# Patient Record
Sex: FEMALE | Race: WHITE | NOT HISPANIC OR LATINO | Employment: OTHER | URBAN - METROPOLITAN AREA
[De-identification: names, ages, dates, MRNs, and addresses within clinical notes are randomized per-mention and may not be internally consistent; named-entity substitution may affect disease eponyms.]

---

## 2017-01-23 ENCOUNTER — HOSPITAL ENCOUNTER (EMERGENCY)
Facility: HOSPITAL | Age: 62
Discharge: HOME | End: 2017-01-23
Attending: EMERGENCY MEDICINE
Payer: COMMERCIAL

## 2017-01-23 VITALS
HEART RATE: 77 BPM | RESPIRATION RATE: 18 BRPM | BODY MASS INDEX: 36.65 KG/M2 | OXYGEN SATURATION: 97 % | WEIGHT: 220 LBS | DIASTOLIC BLOOD PRESSURE: 67 MMHG | HEIGHT: 65 IN | TEMPERATURE: 99 F | SYSTOLIC BLOOD PRESSURE: 137 MMHG

## 2017-01-23 DIAGNOSIS — M79.606 LEG PAIN: ICD-10-CM

## 2017-01-23 DIAGNOSIS — M25.569 KNEE PAIN: ICD-10-CM

## 2017-01-23 DIAGNOSIS — M25.462 EFFUSION OF LEFT KNEE: Primary | ICD-10-CM

## 2017-01-23 PROCEDURE — 63600175 PHARM REV CODE 636 W HCPCS: Performed by: NURSE PRACTITIONER

## 2017-01-23 PROCEDURE — 25000003 PHARM REV CODE 250: Performed by: NURSE PRACTITIONER

## 2017-01-23 PROCEDURE — 29505 APPLICATION LONG LEG SPLINT: CPT | Mod: LT

## 2017-01-23 PROCEDURE — 96372 THER/PROPH/DIAG INJ SC/IM: CPT

## 2017-01-23 PROCEDURE — 99284 EMERGENCY DEPT VISIT MOD MDM: CPT | Mod: 25

## 2017-01-23 RX ORDER — HYDROCODONE BITARTRATE AND ACETAMINOPHEN 5; 325 MG/1; MG/1
1 TABLET ORAL
Status: COMPLETED | OUTPATIENT
Start: 2017-01-23 | End: 2017-01-23

## 2017-01-23 RX ORDER — KETOROLAC TROMETHAMINE 30 MG/ML
30 INJECTION, SOLUTION INTRAMUSCULAR; INTRAVENOUS
Status: COMPLETED | OUTPATIENT
Start: 2017-01-23 | End: 2017-01-23

## 2017-01-23 RX ORDER — DICLOFENAC SODIUM 50 MG/1
50 TABLET, DELAYED RELEASE ORAL 3 TIMES DAILY PRN
Qty: 30 TABLET | Refills: 0 | Status: SHIPPED | OUTPATIENT
Start: 2017-01-23 | End: 2021-02-11

## 2017-01-23 RX ORDER — HYDROCODONE BITARTRATE AND ACETAMINOPHEN 5; 325 MG/1; MG/1
1 TABLET ORAL EVERY 4 HOURS PRN
Qty: 6 TABLET | Refills: 0 | Status: SHIPPED | OUTPATIENT
Start: 2017-01-23 | End: 2017-01-23

## 2017-01-23 RX ADMIN — KETOROLAC TROMETHAMINE 30 MG: 30 INJECTION, SOLUTION INTRAMUSCULAR at 06:01

## 2017-01-23 RX ADMIN — HYDROCODONE BITARTRATE AND ACETAMINOPHEN 1 TABLET: 5; 325 TABLET ORAL at 08:01

## 2017-01-23 NOTE — ED AVS SNAPSHOT
[facility] [address] [place] [address]               Courtney Nino   2017  5:47 PM   ED    Description:  Female : 1955   Department:  [facility]           Your Care was Coordinated By:     Provider Role From To    Lavon Perez MD Attending Provider 17 0257 --    Lola Gardner NP Nurse Practitioner 17 [phone] --      Reason for Visit     Knee Pain           Diagnoses this Visit        Comments    Effusion of left knee    -  Primary     Leg pain         Knee pain           ED Disposition     ED Disposition Condition Comment    Discharge             To Do List           Follow-up Information     Follow up with [facility].    Specialty:  Emergency Medicine    Why:  As needed, If symptoms worsen    Contact information:    [address] [place] [id number] [phone]        Schedule an appointment as soon as possible for a visit with John Talavera MD.    Specialty:  Orthopedic Surgery    Contact information:    [address] [facility]  [place] [address]  [phone]          Schedule an appointment as soon as possible for a visit with Blair Taylor MD.    Specialties:  Sports Medicine, Orthopedic Surgery    Contact information:    [address] [place] [address] [address] [place] [address]  [phone]         These Medications        Disp Refills Start End    diclofenac (VOLTAREN) 50 MG EC tablet 30 tablet 0 2017     Take 1 tablet (50 mg total) by mouth 3 (three) times daily as needed (for pain). - Oral      [facility] On Call     [facility] On Call Nurse Care Line -  Assistance  Registered nurses in the [facility] provide clinical advisement, health education, appointment booking, and other advisory services.  Call for this free service at [phone].             Medications           START taking these NEW medications        Refills    diclofenac (VOLTAREN) 50  "MG EC tablet 0    Sig: Take 1 tablet (50 mg total) by mouth 3 (three) times daily as needed (for pain).    Class: Print    Route: Oral      These medications were administered today        Dose Freq    ketorolac injection 30 mg 30 mg ED 1 Time    Sig: Inject 30 mg into the muscle ED 1 Time.    Class: Normal    Route: Intramuscular    hydrocodone-acetaminophen 5-325mg per tablet 1 tablet 1 tablet ED 1 Time    Sig: Take 1 tablet by mouth ED 1 Time.    Class: Normal    Route: Oral    Cosign for Ordering: Required by Lavon Perez MD      STOP taking these medications     ondansetron (ZOFRAN) 8 MG tablet Take 1 tablet (8 mg total) by mouth every 12 (twelve) hours as needed for Nausea.           Verify that the below list of medications is an accurate representation of the medications you are currently taking.  If none reported, the list may be blank. If incorrect, please contact your healthcare provider. Carry this list with you in case of emergency.           Current Medications     diclofenac (VOLTAREN) 50 MG EC tablet Take 1 tablet (50 mg total) by mouth 3 (three) times daily as needed (for pain).           Clinical Reference Information           Your Vitals Were     BP Pulse Temp Resp Height Weight    137/67 (BP Location: Right arm, Patient Position: Sitting) 77 98.5 °F (36.9 °C) (Oral) 18 5' 5" (1.651 m) 99.8 kg (220 lb)    SpO2 BMI             97% 36.61 kg/m2         Allergies as of 1/23/2017     No Known Allergies      Immunizations Administered on Date of Encounter - 1/23/2017     None      ED Micro, Lab, POCT     None      ED Imaging Orders     Start Ordered       Status Ordering Provider    01/23/17 1841 01/23/17 1821  X-Ray Knee 1 or 2 View Left  1 time imaging      Final result     01/23/17 1821 01/23/17 1821  US Lower Extremity Veins Left  1 time imaging      In process     01/23/17 1821 01/23/17 1821    1 time imaging,   Status:  Canceled      Canceled       Discharge References/Attachments     KNEE " EFFUSION (ENGLISH)    KNEE PAIN (ENGLISH)      [facility] Sign-Up     Activating your [facility] account is as easy as 1-2-3!     1) Visit [email or web address], select Sign Up Now, enter this activation code and your date of birth, then select Next.  [email or web address]  Expires: 3/9/2017  8:27 PM      2) Create a username and password to use when you visit [facility] in the future and select a security question in case you lose your password and select Next.    3) Enter your e-mail address and click Sign Up!    Additional Information  If you have questions, please e-mail [email or web address] or call [phone] to talk to our [facility] staff. Remember, [facility] is NOT to be used for urgent needs. For medical emergencies, dial 911.         Smoking Cessation     If you would like to quit smoking:   You may be eligible for free services if you are a [place] resident and started smoking cigarettes before September 1, 1988.  Call the Smoking Cessation Trust (SCT) toll free at (050) 157-0680 or (566) 110-5331.   Call 9-548-QUIT-NOW if you do not meet the above criteria.             [facility] complies with applicable Federal civil rights laws and does not discriminate on the basis of race, color, national origin, age, disability, or sex.        Language Assistance Services     ATTENTION: Language assistance services are available, free of charge. Please call [phone].      ATENCIÓN: Si habla español, tiene a rick disposición servicios gratuitos de asistencia lingüística. Llame al [phone].     CHÚ Ý: N?u b?n nói Ti?ng Vi?t, có các d?ch v? h? tr? ngôn ng? mi?n phí dành cho b?n. G?i s? [phone].

## 2017-01-24 NOTE — ED PROVIDER NOTES
Encounter Date: 1/23/2017       History     Chief Complaint   Patient presents with    Knee Pain     x 2 weeks      Review of patient's allergies indicates:  No Known Allergies  HPI Comments: Courtney Nino is a 61 year old female with no pmh presenting to the ED with c/o left knee and calf pain. The patient states that she began having pain approximately two weeks ago without known injury or trauma. She states that approximately one week ago, she did feel a pop in her knee when stepping up. She states the pain has radiated into the calf and that she has had posterior calf swelling. She has not been taking any medications for pain and has no prior history of knee complications. She has no prior history of DVT.     The history is provided by the patient.     History reviewed. No pertinent past medical history.  No past medical history pertinent negatives.  Past Surgical History   Procedure Laterality Date    Hysterectomy      Tonsillectomy      Breast surgery       knot on right     History reviewed. No pertinent family history.  Social History   Substance Use Topics    Smoking status: Current Every Day Smoker     Packs/day: 1.00     Years: 39.00     Types: Cigarettes    Smokeless tobacco: None    Alcohol use Yes      Comment: occ     Review of Systems   Constitutional: Negative.    HENT: Negative.    Cardiovascular: Positive for leg swelling. Negative for chest pain and palpitations.   Gastrointestinal: Negative.    Genitourinary: Negative.    Musculoskeletal: Positive for arthralgias (left knee).   Skin: Negative.    Neurological: Negative.        Physical Exam   Initial Vitals   BP Pulse Resp Temp SpO2   01/23/17 1732 01/23/17 1732 01/23/17 1732 01/23/17 1732 01/23/17 1732   135/73 69 18 99 °F (37.2 °C) 97 %     Physical Exam    Nursing note and vitals reviewed.  Constitutional: She appears well-developed and well-nourished. She is not diaphoretic. No distress.   HENT:   Head: Normocephalic and atraumatic.    Eyes: Conjunctivae are normal.   Neck: Normal range of motion.   Cardiovascular: Normal rate and regular rhythm.   Pulmonary/Chest: Breath sounds normal. No respiratory distress.   Musculoskeletal:        Left knee: She exhibits decreased range of motion (due to pain). She exhibits no swelling, no effusion, no deformity and no bony tenderness. Tenderness found. Medial joint line tenderness noted.        Left lower leg: She exhibits tenderness. She exhibits no swelling.        Legs:  Decreased ROM due to pain. No erythema or warmth to left knee   Neurological: She is alert and oriented to person, place, and time.   Skin: Skin is warm and dry.         ED Course   Procedures  Labs Reviewed - No data to display                APC / Resident Notes:   Courtney Nino is a 61 year old female presenting to the ED with left knee pain without injury or trauma. She reported left calf swelling as well with no swelling of knee or calf noted on exam. Ultrasound was negative for DVT. Xray read as follows: 2 views of the left knee demonstrate a joint effusion but no evidence of acute fracture. There is slight medial joint space narrowing. I do not suspect septic arthritis as the joint is not erythematous or warm. She did have limited ROM due to pain but was able to flex and extend the knee. I do not suspect complete ligamentous injury. She was placed in a knee immobilizer and provided crutches. I believe she will require follow up with orthopedics so she was provided referral at discharge. I discussed specific return precautions and provided prescription for NSAIDs for pain relief. Based on my clinical evaluation, I do not appreciate any immediate, emergent, or life threatening condition or etiology that warrants additional workup today and feel that the patient can be discharged with close follow up care.              Attending Attestation:     Physician Attestation Statement for NP/PA:   I discussed this assessment and plan of this  patient with the NP/PA, but I did not personally examine the patient. The face to face encounter was performed by the NP/PA.    Other NP/PA Attestation Additions:    History of Present Illness: 61-year-old female presented with a chief complaint of knee and calf pain.    Medical Decision Making: Initial differential diagnosis included but not limited to fracture, ligamentous injury, and DVT.  The patient's x-ray showed no acute abnormalities per my independent interpretation.  I am in agreement with the nurse practitioner's assessment, treatment, and plan of care.                 ED Course     Clinical Impression:   The primary encounter diagnosis was Effusion of left knee. Diagnoses of Leg pain and Knee pain were also pertinent to this visit.    Disposition:   Disposition: Discharged  Condition: Stable       Lola Gardner NP  01/24/17 1711       Lavon Perez MD  01/24/17 8133

## 2017-01-24 NOTE — ED NOTES
Pt arrives in WC, c/o pain to the left lower extremity. Pt states she injured leg while climbing step. Leg is mildly swollen and red. Pt awake, alert.

## 2017-01-24 NOTE — ED NOTES
Patient instructed and trained on crutch use. Patient verbalized understanding and demonstrated proper crutch usage.

## 2021-02-11 ENCOUNTER — OFFICE VISIT (OUTPATIENT)
Dept: FAMILY MEDICINE | Facility: CLINIC | Age: 66
End: 2021-02-11
Payer: MEDICARE

## 2021-02-11 VITALS
RESPIRATION RATE: 20 BRPM | HEIGHT: 65 IN | DIASTOLIC BLOOD PRESSURE: 96 MMHG | SYSTOLIC BLOOD PRESSURE: 162 MMHG | OXYGEN SATURATION: 99 % | BODY MASS INDEX: 40.46 KG/M2 | HEART RATE: 92 BPM | TEMPERATURE: 98 F | WEIGHT: 242.81 LBS

## 2021-02-11 DIAGNOSIS — E66.01 CLASS 3 SEVERE OBESITY WITH BODY MASS INDEX (BMI) OF 40.0 TO 44.9 IN ADULT, UNSPECIFIED OBESITY TYPE, UNSPECIFIED WHETHER SERIOUS COMORBIDITY PRESENT: ICD-10-CM

## 2021-02-11 DIAGNOSIS — F17.210 CIGARETTE SMOKER: ICD-10-CM

## 2021-02-11 DIAGNOSIS — R06.09 DOE (DYSPNEA ON EXERTION): Primary | ICD-10-CM

## 2021-02-11 DIAGNOSIS — Z12.2 ENCOUNTER FOR SCREENING FOR MALIGNANT NEOPLASM OF RESPIRATORY ORGANS: ICD-10-CM

## 2021-02-11 DIAGNOSIS — I10 ESSENTIAL HYPERTENSION: ICD-10-CM

## 2021-02-11 DIAGNOSIS — R53.83 FATIGUE, UNSPECIFIED TYPE: ICD-10-CM

## 2021-02-11 DIAGNOSIS — Z00.00 PREVENTATIVE HEALTH CARE: ICD-10-CM

## 2021-02-11 DIAGNOSIS — I10 ESSENTIAL (PRIMARY) HYPERTENSION: ICD-10-CM

## 2021-02-11 PROBLEM — E66.813 CLASS 3 SEVERE OBESITY WITH BODY MASS INDEX (BMI) OF 40.0 TO 44.9 IN ADULT: Status: ACTIVE | Noted: 2021-02-11

## 2021-02-11 LAB
EKG 12-LEAD: NORMAL
PR INTERVAL: NORMAL
PRT AXES: NORMAL
QRS DURATION: NORMAL
QT/QTC: NORMAL
VENTRICULAR RATE: NORMAL

## 2021-02-11 PROCEDURE — 1125F AMNT PAIN NOTED PAIN PRSNT: CPT | Mod: S$GLB,,, | Performed by: FAMILY MEDICINE

## 2021-02-11 PROCEDURE — 99396 PREV VISIT EST AGE 40-64: CPT | Mod: 25,S$GLB,, | Performed by: FAMILY MEDICINE

## 2021-02-11 PROCEDURE — 1125F PR PAIN SEVERITY QUANTIFIED, PAIN PRESENT: ICD-10-PCS | Mod: S$GLB,,, | Performed by: FAMILY MEDICINE

## 2021-02-11 PROCEDURE — 3077F PR MOST RECENT SYSTOLIC BLOOD PRESSURE >= 140 MM HG: ICD-10-PCS | Mod: S$GLB,,, | Performed by: FAMILY MEDICINE

## 2021-02-11 PROCEDURE — 99396 PR PREVENTIVE VISIT,EST,40-64: ICD-10-PCS | Mod: 25,S$GLB,, | Performed by: FAMILY MEDICINE

## 2021-02-11 PROCEDURE — 93000 POCT EKG 12-LEAD: ICD-10-PCS | Mod: S$GLB,,, | Performed by: FAMILY MEDICINE

## 2021-02-11 PROCEDURE — 3008F BODY MASS INDEX DOCD: CPT | Mod: S$GLB,,, | Performed by: FAMILY MEDICINE

## 2021-02-11 PROCEDURE — 3077F SYST BP >= 140 MM HG: CPT | Mod: S$GLB,,, | Performed by: FAMILY MEDICINE

## 2021-02-11 PROCEDURE — 93000 ELECTROCARDIOGRAM COMPLETE: CPT | Mod: S$GLB,,, | Performed by: FAMILY MEDICINE

## 2021-02-11 PROCEDURE — 3080F PR MOST RECENT DIASTOLIC BLOOD PRESSURE >= 90 MM HG: ICD-10-PCS | Mod: S$GLB,,, | Performed by: FAMILY MEDICINE

## 2021-02-11 PROCEDURE — 3008F PR BODY MASS INDEX (BMI) DOCUMENTED: ICD-10-PCS | Mod: S$GLB,,, | Performed by: FAMILY MEDICINE

## 2021-02-11 PROCEDURE — 3080F DIAST BP >= 90 MM HG: CPT | Mod: S$GLB,,, | Performed by: FAMILY MEDICINE

## 2021-02-11 RX ORDER — IRBESARTAN 150 MG/1
150 TABLET ORAL NIGHTLY
Qty: 90 TABLET | Refills: 3 | Status: ON HOLD | OUTPATIENT
Start: 2021-02-11 | End: 2021-10-30 | Stop reason: HOSPADM

## 2021-02-17 ENCOUNTER — HOSPITAL ENCOUNTER (OUTPATIENT)
Dept: RADIOLOGY | Facility: HOSPITAL | Age: 66
Discharge: HOME | End: 2021-02-17
Attending: FAMILY MEDICINE
Payer: MEDICARE

## 2021-02-17 DIAGNOSIS — F17.210 CIGARETTE SMOKER: ICD-10-CM

## 2021-02-17 PROCEDURE — 71271 CT THORAX LUNG CANCER SCR C-: CPT | Mod: TC,PO

## 2021-02-18 ENCOUNTER — TELEPHONE (OUTPATIENT)
Dept: FAMILY MEDICINE | Facility: CLINIC | Age: 66
End: 2021-02-18

## 2021-02-19 ENCOUNTER — TELEPHONE (OUTPATIENT)
Dept: FAMILY MEDICINE | Facility: CLINIC | Age: 66
End: 2021-02-19

## 2021-02-22 ENCOUNTER — LAB VISIT (OUTPATIENT)
Dept: LAB | Facility: HOSPITAL | Age: 66
End: 2021-02-22
Attending: FAMILY MEDICINE
Payer: MEDICARE

## 2021-02-22 ENCOUNTER — TELEPHONE (OUTPATIENT)
Dept: FAMILY MEDICINE | Facility: CLINIC | Age: 66
End: 2021-02-22

## 2021-02-22 DIAGNOSIS — E55.9 VITAMIN D DEFICIENCY: Primary | ICD-10-CM

## 2021-02-22 DIAGNOSIS — R06.09 DOE (DYSPNEA ON EXERTION): ICD-10-CM

## 2021-02-22 DIAGNOSIS — E66.01 CLASS 3 SEVERE OBESITY WITH BODY MASS INDEX (BMI) OF 40.0 TO 44.9 IN ADULT, UNSPECIFIED OBESITY TYPE, UNSPECIFIED WHETHER SERIOUS COMORBIDITY PRESENT: ICD-10-CM

## 2021-02-22 DIAGNOSIS — I10 ESSENTIAL (PRIMARY) HYPERTENSION: ICD-10-CM

## 2021-02-22 DIAGNOSIS — Z00.00 PREVENTATIVE HEALTH CARE: ICD-10-CM

## 2021-02-22 DIAGNOSIS — R53.83 FATIGUE, UNSPECIFIED TYPE: ICD-10-CM

## 2021-02-22 DIAGNOSIS — I10 ESSENTIAL HYPERTENSION: ICD-10-CM

## 2021-02-22 LAB
25(OH)D3+25(OH)D2 SERPL-MCNC: 23 NG/ML (ref 30–96)
ALBUMIN SERPL BCP-MCNC: 4 G/DL (ref 3.5–5.2)
ALP SERPL-CCNC: 71 U/L (ref 55–135)
ALT SERPL W/O P-5'-P-CCNC: 21 U/L (ref 10–44)
ANION GAP SERPL CALC-SCNC: 9 MMOL/L (ref 8–16)
AST SERPL-CCNC: 18 U/L (ref 10–40)
BASOPHILS # BLD AUTO: 0.06 K/UL (ref 0–0.2)
BASOPHILS NFR BLD: 0.8 % (ref 0–1.9)
BILIRUB SERPL-MCNC: 0.7 MG/DL (ref 0.1–1)
BUN SERPL-MCNC: 11 MG/DL (ref 8–23)
CALCIUM SERPL-MCNC: 9 MG/DL (ref 8.7–10.5)
CHLORIDE SERPL-SCNC: 102 MMOL/L (ref 95–110)
CHOLEST SERPL-MCNC: 197 MG/DL (ref 120–199)
CHOLEST/HDLC SERPL: 4.8 {RATIO} (ref 2–5)
CO2 SERPL-SCNC: 30 MMOL/L (ref 23–29)
CREAT SERPL-MCNC: 0.7 MG/DL (ref 0.5–1.4)
DIFFERENTIAL METHOD: ABNORMAL
EOSINOPHIL # BLD AUTO: 0.2 K/UL (ref 0–0.5)
EOSINOPHIL NFR BLD: 2.6 % (ref 0–8)
ERYTHROCYTE [DISTWIDTH] IN BLOOD BY AUTOMATED COUNT: 12.6 % (ref 11.5–14.5)
EST. GFR  (AFRICAN AMERICAN): >60 ML/MIN/1.73 M^2
EST. GFR  (NON AFRICAN AMERICAN): >60 ML/MIN/1.73 M^2
GLUCOSE SERPL-MCNC: 131 MG/DL (ref 70–110)
HCT VFR BLD AUTO: 47.5 % (ref 37–48.5)
HDLC SERPL-MCNC: 41 MG/DL (ref 40–75)
HDLC SERPL: 20.8 % (ref 20–50)
HGB BLD-MCNC: 14.8 G/DL (ref 12–16)
IMM GRANULOCYTES # BLD AUTO: 0.03 K/UL (ref 0–0.04)
IMM GRANULOCYTES NFR BLD AUTO: 0.4 % (ref 0–0.5)
LDLC SERPL CALC-MCNC: 141.2 MG/DL (ref 63–159)
LYMPHOCYTES # BLD AUTO: 2.9 K/UL (ref 1–4.8)
LYMPHOCYTES NFR BLD: 38.3 % (ref 18–48)
MCH RBC QN AUTO: 30.1 PG (ref 27–31)
MCHC RBC AUTO-ENTMCNC: 31.2 G/DL (ref 32–36)
MCV RBC AUTO: 97 FL (ref 82–98)
MONOCYTES # BLD AUTO: 0.7 K/UL (ref 0.3–1)
MONOCYTES NFR BLD: 8.5 % (ref 4–15)
NEUTROPHILS # BLD AUTO: 3.8 K/UL (ref 1.8–7.7)
NEUTROPHILS NFR BLD: 49.4 % (ref 38–73)
NONHDLC SERPL-MCNC: 156 MG/DL
NRBC BLD-RTO: 0 /100 WBC
PLATELET # BLD AUTO: 287 K/UL (ref 150–350)
PMV BLD AUTO: 11.3 FL (ref 9.2–12.9)
POTASSIUM SERPL-SCNC: 4.9 MMOL/L (ref 3.5–5.1)
PROT SERPL-MCNC: 7.1 G/DL (ref 6–8.4)
RBC # BLD AUTO: 4.92 M/UL (ref 4–5.4)
SODIUM SERPL-SCNC: 141 MMOL/L (ref 136–145)
TRIGL SERPL-MCNC: 74 MG/DL (ref 30–150)
WBC # BLD AUTO: 7.65 K/UL (ref 3.9–12.7)

## 2021-02-22 PROCEDURE — 36415 COLL VENOUS BLD VENIPUNCTURE: CPT

## 2021-02-22 PROCEDURE — 80061 LIPID PANEL: CPT

## 2021-02-22 PROCEDURE — 82306 VITAMIN D 25 HYDROXY: CPT

## 2021-02-22 PROCEDURE — 86803 HEPATITIS C AB TEST: CPT

## 2021-02-22 PROCEDURE — 87389 HIV-1 AG W/HIV-1&-2 AB AG IA: CPT

## 2021-02-22 PROCEDURE — 80053 COMPREHEN METABOLIC PANEL: CPT

## 2021-02-22 PROCEDURE — 85025 COMPLETE CBC W/AUTO DIFF WBC: CPT

## 2021-02-22 RX ORDER — ERGOCALCIFEROL 1.25 MG/1
50000 CAPSULE ORAL
Qty: 4 CAPSULE | Refills: 11 | Status: ON HOLD | OUTPATIENT
Start: 2021-02-22 | End: 2021-10-25

## 2021-02-23 LAB
HCV AB S/CO SERPL IA: <0.1 S/CO RATIO (ref 0–0.9)
HIV 1+2 AB+HIV1 P24 AG SERPL QL IA: NON REACTIVE

## 2021-02-24 ENCOUNTER — TELEPHONE (OUTPATIENT)
Dept: FAMILY MEDICINE | Facility: CLINIC | Age: 66
End: 2021-02-24

## 2021-02-26 ENCOUNTER — OFFICE VISIT (OUTPATIENT)
Dept: FAMILY MEDICINE | Facility: CLINIC | Age: 66
End: 2021-02-26
Payer: MEDICARE

## 2021-02-26 VITALS
TEMPERATURE: 98 F | HEIGHT: 65 IN | BODY MASS INDEX: 40.32 KG/M2 | HEART RATE: 55 BPM | OXYGEN SATURATION: 99 % | RESPIRATION RATE: 20 BRPM | WEIGHT: 242 LBS | DIASTOLIC BLOOD PRESSURE: 55 MMHG | SYSTOLIC BLOOD PRESSURE: 130 MMHG

## 2021-02-26 DIAGNOSIS — R53.82 CHRONIC FATIGUE: ICD-10-CM

## 2021-02-26 DIAGNOSIS — Z23 IMMUNIZATION DUE: ICD-10-CM

## 2021-02-26 DIAGNOSIS — I10 ESSENTIAL HYPERTENSION: Primary | ICD-10-CM

## 2021-02-26 DIAGNOSIS — F51.12 INSUFFICIENT SLEEP SYNDROME: ICD-10-CM

## 2021-02-26 DIAGNOSIS — Z12.31 ENCOUNTER FOR SCREENING MAMMOGRAM FOR BREAST CANCER: ICD-10-CM

## 2021-02-26 PROCEDURE — 1126F PR PAIN SEVERITY QUANTIFIED, NO PAIN PRESENT: ICD-10-PCS | Mod: S$GLB,,, | Performed by: FAMILY MEDICINE

## 2021-02-26 PROCEDURE — 90714 TD VACC NO PRESV 7 YRS+ IM: CPT | Mod: S$GLB,,, | Performed by: FAMILY MEDICINE

## 2021-02-26 PROCEDURE — 3078F PR MOST RECENT DIASTOLIC BLOOD PRESSURE < 80 MM HG: ICD-10-PCS | Mod: S$GLB,,, | Performed by: FAMILY MEDICINE

## 2021-02-26 PROCEDURE — 99214 PR OFFICE/OUTPT VISIT, EST, LEVL IV, 30-39 MIN: ICD-10-PCS | Mod: 25,S$GLB,, | Performed by: FAMILY MEDICINE

## 2021-02-26 PROCEDURE — 3008F BODY MASS INDEX DOCD: CPT | Mod: S$GLB,,, | Performed by: FAMILY MEDICINE

## 2021-02-26 PROCEDURE — 90714 TD VACCINE GREATER THAN OR EQUAL TO 7YO PRESERVATIVE FREE IM: ICD-10-PCS | Mod: S$GLB,,, | Performed by: FAMILY MEDICINE

## 2021-02-26 PROCEDURE — 3075F PR MOST RECENT SYSTOLIC BLOOD PRESS GE 130-139MM HG: ICD-10-PCS | Mod: S$GLB,,, | Performed by: FAMILY MEDICINE

## 2021-02-26 PROCEDURE — 90471 IMMUNIZATION ADMIN: CPT | Mod: S$GLB,,, | Performed by: FAMILY MEDICINE

## 2021-02-26 PROCEDURE — 3078F DIAST BP <80 MM HG: CPT | Mod: S$GLB,,, | Performed by: FAMILY MEDICINE

## 2021-02-26 PROCEDURE — 3075F SYST BP GE 130 - 139MM HG: CPT | Mod: S$GLB,,, | Performed by: FAMILY MEDICINE

## 2021-02-26 PROCEDURE — 99214 OFFICE O/P EST MOD 30 MIN: CPT | Mod: 25,S$GLB,, | Performed by: FAMILY MEDICINE

## 2021-02-26 PROCEDURE — 90471 TD VACCINE GREATER THAN OR EQUAL TO 7YO PRESERVATIVE FREE IM: ICD-10-PCS | Mod: S$GLB,,, | Performed by: FAMILY MEDICINE

## 2021-02-26 PROCEDURE — 1126F AMNT PAIN NOTED NONE PRSNT: CPT | Mod: S$GLB,,, | Performed by: FAMILY MEDICINE

## 2021-02-26 PROCEDURE — 3008F PR BODY MASS INDEX (BMI) DOCUMENTED: ICD-10-PCS | Mod: S$GLB,,, | Performed by: FAMILY MEDICINE

## 2021-03-04 ENCOUNTER — PROCEDURE VISIT (OUTPATIENT)
Dept: SLEEP MEDICINE | Facility: HOSPITAL | Age: 66
End: 2021-03-04
Attending: FAMILY MEDICINE
Payer: MEDICARE

## 2021-03-04 DIAGNOSIS — F51.12 INSUFFICIENT SLEEP SYNDROME: ICD-10-CM

## 2021-03-04 DIAGNOSIS — R53.82 CHRONIC FATIGUE: ICD-10-CM

## 2021-03-04 PROCEDURE — 95806 SLEEP STUDY UNATT&RESP EFFT: CPT

## 2021-03-08 ENCOUNTER — TELEPHONE (OUTPATIENT)
Dept: FAMILY MEDICINE | Facility: CLINIC | Age: 66
End: 2021-03-08

## 2021-03-10 ENCOUNTER — HOSPITAL ENCOUNTER (OUTPATIENT)
Dept: RADIOLOGY | Facility: HOSPITAL | Age: 66
Discharge: HOME | End: 2021-03-10
Attending: FAMILY MEDICINE
Payer: MEDICARE

## 2021-03-10 DIAGNOSIS — Z12.31 ENCOUNTER FOR SCREENING MAMMOGRAM FOR BREAST CANCER: ICD-10-CM

## 2021-03-10 PROCEDURE — 77067 SCR MAMMO BI INCL CAD: CPT | Mod: TC,PO

## 2021-03-11 ENCOUNTER — TELEPHONE (OUTPATIENT)
Dept: FAMILY MEDICINE | Facility: CLINIC | Age: 66
End: 2021-03-11

## 2021-03-16 ENCOUNTER — CLINICAL SUPPORT (OUTPATIENT)
Dept: CARDIOLOGY | Facility: HOSPITAL | Age: 66
End: 2021-03-16
Attending: FAMILY MEDICINE
Payer: MEDICARE

## 2021-03-16 VITALS — WEIGHT: 242.06 LBS | HEIGHT: 65 IN | BODY MASS INDEX: 40.33 KG/M2

## 2021-03-16 DIAGNOSIS — I10 ESSENTIAL HYPERTENSION: ICD-10-CM

## 2021-03-16 DIAGNOSIS — R06.09 DOE (DYSPNEA ON EXERTION): ICD-10-CM

## 2021-03-16 PROCEDURE — 93306 TTE W/DOPPLER COMPLETE: CPT | Mod: 26,,, | Performed by: INTERNAL MEDICINE

## 2021-03-16 PROCEDURE — 93306 ECHO (CUPID ONLY): ICD-10-PCS | Mod: 26,,, | Performed by: INTERNAL MEDICINE

## 2021-03-16 PROCEDURE — 93306 TTE W/DOPPLER COMPLETE: CPT

## 2021-03-17 LAB
AORTIC ROOT ANNULUS: 3.12 CM
AORTIC VALVE CUSP SEPERATION: 1.77 CM
AV INDEX (PROSTH): 0.71
AV MEAN GRADIENT: 9 MMHG
AV PEAK GRADIENT: 17 MMHG
AV VALVE AREA: 3.09 CM2
AV VELOCITY RATIO: 60.7
BSA FOR ECHO PROCEDURE: 2.24 M2
CV ECHO LV RWT: 0.51 CM
DOP CALC AO PEAK VEL: 2.07 M/S
DOP CALC AO VTI: 36.59 CM
DOP CALC LVOT AREA: 4.3 CM2
DOP CALC LVOT DIAMETER: 2.35 CM
DOP CALC LVOT PEAK VEL: 125.64 M/S
DOP CALC LVOT STROKE VOLUME: 112.89 CM3
DOP CALCLVOT PEAK VEL VTI: 26.04 CM
E WAVE DECELERATION TIME: 227.91 MSEC
E/A RATIO: 1.06
E/E' RATIO: 16.13 M/S
ECHO LV POSTERIOR WALL: 1.37 CM (ref 0.6–1.1)
FRACTIONAL SHORTENING: 26 % (ref 28–44)
INTERVENTRICULAR SEPTUM: 1.36 CM (ref 0.6–1.1)
IVRT: 71.83 MSEC
LEFT ATRIUM SIZE: 4.52 CM
LEFT INTERNAL DIMENSION IN SYSTOLE: 3.98 CM (ref 2.1–4)
LEFT VENTRICLE MASS INDEX: 146 G/M2
LEFT VENTRICULAR INTERNAL DIMENSION IN DIASTOLE: 5.38 CM (ref 3.5–6)
LEFT VENTRICULAR MASS: 314.84 G
LV LATERAL E/E' RATIO: 16.13 M/S
LV SEPTAL E/E' RATIO: 16.13 M/S
MV PEAK A VEL: 1.22 M/S
MV PEAK E VEL: 1.29 M/S
PISA TR MAX VEL: 2.58 M/S
PV PEAK VELOCITY: 96.56 CM/S
RA PRESSURE: 3 MMHG
RIGHT VENTRICULAR END-DIASTOLIC DIMENSION: 210 CM
TDI LATERAL: 0.08 M/S
TDI SEPTAL: 0.08 M/S
TDI: 0.08 M/S
TR MAX PG: 27 MMHG
TV REST PULMONARY ARTERY PRESSURE: 30 MMHG

## 2021-03-18 ENCOUNTER — TELEPHONE (OUTPATIENT)
Dept: FAMILY MEDICINE | Facility: CLINIC | Age: 66
End: 2021-03-18

## 2021-03-30 ENCOUNTER — HOSPITAL ENCOUNTER (OUTPATIENT)
Dept: RADIOLOGY | Facility: HOSPITAL | Age: 66
Discharge: HOME | End: 2021-03-30
Attending: FAMILY MEDICINE
Payer: MEDICARE

## 2021-03-30 DIAGNOSIS — R92.2 INCONCLUSIVE MAMMOGRAM: ICD-10-CM

## 2021-03-30 PROCEDURE — 77061 BREAST TOMOSYNTHESIS UNI: CPT | Mod: TC,PO,LT

## 2021-03-31 ENCOUNTER — TELEPHONE (OUTPATIENT)
Dept: FAMILY MEDICINE | Facility: CLINIC | Age: 66
End: 2021-03-31

## 2021-05-17 PROBLEM — Z00.00 PREVENTATIVE HEALTH CARE: Status: RESOLVED | Noted: 2021-02-11 | Resolved: 2021-05-17

## 2021-06-18 ENCOUNTER — OFFICE VISIT (OUTPATIENT)
Dept: FAMILY MEDICINE | Facility: CLINIC | Age: 66
End: 2021-06-18
Payer: MEDICARE

## 2021-06-18 VITALS
DIASTOLIC BLOOD PRESSURE: 70 MMHG | SYSTOLIC BLOOD PRESSURE: 132 MMHG | HEART RATE: 104 BPM | OXYGEN SATURATION: 97 % | RESPIRATION RATE: 18 BRPM | TEMPERATURE: 99 F | WEIGHT: 242.88 LBS | BODY MASS INDEX: 40.47 KG/M2 | HEIGHT: 65 IN

## 2021-06-18 DIAGNOSIS — I73.89 ACROCYANOSIS: Primary | ICD-10-CM

## 2021-06-18 PROCEDURE — 99214 OFFICE O/P EST MOD 30 MIN: CPT | Mod: S$GLB,,, | Performed by: FAMILY MEDICINE

## 2021-06-18 PROCEDURE — 3008F PR BODY MASS INDEX (BMI) DOCUMENTED: ICD-10-PCS | Mod: S$GLB,,, | Performed by: FAMILY MEDICINE

## 2021-06-18 PROCEDURE — 1126F PR PAIN SEVERITY QUANTIFIED, NO PAIN PRESENT: ICD-10-PCS | Mod: S$GLB,,, | Performed by: FAMILY MEDICINE

## 2021-06-18 PROCEDURE — 99214 PR OFFICE/OUTPT VISIT, EST, LEVL IV, 30-39 MIN: ICD-10-PCS | Mod: S$GLB,,, | Performed by: FAMILY MEDICINE

## 2021-06-18 PROCEDURE — 3008F BODY MASS INDEX DOCD: CPT | Mod: S$GLB,,, | Performed by: FAMILY MEDICINE

## 2021-06-18 PROCEDURE — 1126F AMNT PAIN NOTED NONE PRSNT: CPT | Mod: S$GLB,,, | Performed by: FAMILY MEDICINE

## 2021-06-29 ENCOUNTER — HOSPITAL ENCOUNTER (OUTPATIENT)
Dept: RADIOLOGY | Facility: HOSPITAL | Age: 66
Discharge: HOME | End: 2021-06-29
Attending: FAMILY MEDICINE
Payer: MEDICARE

## 2021-06-29 DIAGNOSIS — I73.89 ACROCYANOSIS: ICD-10-CM

## 2021-06-29 PROCEDURE — 71046 X-RAY EXAM CHEST 2 VIEWS: CPT | Mod: TC,PO

## 2021-06-30 ENCOUNTER — TELEPHONE (OUTPATIENT)
Dept: FAMILY MEDICINE | Facility: CLINIC | Age: 66
End: 2021-06-30

## 2021-06-30 DIAGNOSIS — E55.9 VITAMIN D DEFICIENCY: Primary | ICD-10-CM

## 2021-06-30 RX ORDER — ERGOCALCIFEROL 1.25 MG/1
50000 CAPSULE ORAL
Qty: 4 CAPSULE | Refills: 11 | Status: SHIPPED | OUTPATIENT
Start: 2021-06-30 | End: 2022-06-30

## 2021-09-23 ENCOUNTER — OFFICE VISIT (OUTPATIENT)
Dept: PULMONOLOGY | Facility: CLINIC | Age: 66
End: 2021-09-23
Payer: MEDICARE

## 2021-09-23 VITALS
BODY MASS INDEX: 41.27 KG/M2 | HEART RATE: 74 BPM | DIASTOLIC BLOOD PRESSURE: 82 MMHG | OXYGEN SATURATION: 98 % | WEIGHT: 248 LBS | SYSTOLIC BLOOD PRESSURE: 126 MMHG

## 2021-09-23 DIAGNOSIS — I73.89 ACROCYANOSIS: ICD-10-CM

## 2021-09-23 DIAGNOSIS — F17.210 CIGARETTE SMOKER: ICD-10-CM

## 2021-09-23 DIAGNOSIS — E66.01 CLASS 3 SEVERE OBESITY WITH BODY MASS INDEX (BMI) OF 40.0 TO 44.9 IN ADULT, UNSPECIFIED OBESITY TYPE, UNSPECIFIED WHETHER SERIOUS COMORBIDITY PRESENT: ICD-10-CM

## 2021-09-23 DIAGNOSIS — R06.09 DOE (DYSPNEA ON EXERTION): ICD-10-CM

## 2021-09-23 DIAGNOSIS — G47.33 OBSTRUCTIVE SLEEP APNEA SYNDROME: ICD-10-CM

## 2021-09-23 PROCEDURE — 1160F PR REVIEW ALL MEDS BY PRESCRIBER/CLIN PHARMACIST DOCUMENTED: ICD-10-PCS | Mod: S$GLB,,, | Performed by: INTERNAL MEDICINE

## 2021-09-23 PROCEDURE — 3008F PR BODY MASS INDEX (BMI) DOCUMENTED: ICD-10-PCS | Mod: S$GLB,,, | Performed by: INTERNAL MEDICINE

## 2021-09-23 PROCEDURE — 1160F RVW MEDS BY RX/DR IN RCRD: CPT | Mod: S$GLB,,, | Performed by: INTERNAL MEDICINE

## 2021-09-23 PROCEDURE — 3074F SYST BP LT 130 MM HG: CPT | Mod: S$GLB,,, | Performed by: INTERNAL MEDICINE

## 2021-09-23 PROCEDURE — 3008F BODY MASS INDEX DOCD: CPT | Mod: S$GLB,,, | Performed by: INTERNAL MEDICINE

## 2021-09-23 PROCEDURE — 3074F PR MOST RECENT SYSTOLIC BLOOD PRESSURE < 130 MM HG: ICD-10-PCS | Mod: S$GLB,,, | Performed by: INTERNAL MEDICINE

## 2021-09-23 PROCEDURE — 3079F DIAST BP 80-89 MM HG: CPT | Mod: S$GLB,,, | Performed by: INTERNAL MEDICINE

## 2021-09-23 PROCEDURE — 99204 OFFICE O/P NEW MOD 45 MIN: CPT | Mod: S$GLB,,, | Performed by: INTERNAL MEDICINE

## 2021-09-23 PROCEDURE — 99204 PR OFFICE/OUTPT VISIT, NEW, LEVL IV, 45-59 MIN: ICD-10-PCS | Mod: S$GLB,,, | Performed by: INTERNAL MEDICINE

## 2021-09-23 PROCEDURE — 4010F PR ACE/ARB THEARPY RXD/TAKEN: ICD-10-PCS | Mod: S$GLB,,, | Performed by: INTERNAL MEDICINE

## 2021-09-23 PROCEDURE — 3079F PR MOST RECENT DIASTOLIC BLOOD PRESSURE 80-89 MM HG: ICD-10-PCS | Mod: S$GLB,,, | Performed by: INTERNAL MEDICINE

## 2021-09-23 PROCEDURE — 4010F ACE/ARB THERAPY RXD/TAKEN: CPT | Mod: S$GLB,,, | Performed by: INTERNAL MEDICINE

## 2021-09-30 ENCOUNTER — TELEPHONE (OUTPATIENT)
Dept: PULMONOLOGY | Facility: CLINIC | Age: 66
End: 2021-09-30

## 2021-09-30 DIAGNOSIS — Z01.818 PREOPERATIVE EXAMINATION, UNSPECIFIED: Primary | ICD-10-CM

## 2021-10-13 ENCOUNTER — HOSPITAL ENCOUNTER (OUTPATIENT)
Dept: PREADMISSION TESTING | Facility: HOSPITAL | Age: 66
Discharge: HOME | End: 2021-10-13
Attending: INTERNAL MEDICINE
Payer: MEDICARE

## 2021-10-13 DIAGNOSIS — Z01.818 PREOPERATIVE EXAMINATION, UNSPECIFIED: ICD-10-CM

## 2021-10-13 LAB — SARS-COV-2 RDRP RESP QL NAA+PROBE: NEGATIVE

## 2021-10-13 PROCEDURE — U0002 COVID-19 LAB TEST NON-CDC: HCPCS | Performed by: INTERNAL MEDICINE

## 2021-10-16 ENCOUNTER — PROCEDURE VISIT (OUTPATIENT)
Dept: SLEEP MEDICINE | Facility: HOSPITAL | Age: 66
End: 2021-10-16
Attending: INTERNAL MEDICINE
Payer: MEDICARE

## 2021-10-16 DIAGNOSIS — G47.33 OBSTRUCTIVE SLEEP APNEA SYNDROME: ICD-10-CM

## 2021-10-16 PROCEDURE — 95811 POLYSOM 6/>YRS CPAP 4/> PARM: CPT

## 2021-10-22 DIAGNOSIS — G47.33 OBSTRUCTIVE SLEEP APNEA SYNDROME: Primary | ICD-10-CM

## 2021-10-25 ENCOUNTER — HOSPITAL ENCOUNTER (OUTPATIENT)
Dept: PULMONOLOGY | Facility: HOSPITAL | Age: 66
Discharge: HOME | DRG: 309 | End: 2021-10-25
Attending: INTERNAL MEDICINE
Payer: MEDICARE

## 2021-10-25 ENCOUNTER — CLINICAL SUPPORT (OUTPATIENT)
Dept: CARDIOLOGY | Facility: HOSPITAL | Age: 66
DRG: 309 | End: 2021-10-25
Attending: HOSPITALIST
Payer: MEDICARE

## 2021-10-25 ENCOUNTER — HOSPITAL ENCOUNTER (INPATIENT)
Facility: HOSPITAL | Age: 66
LOS: 4 days | Discharge: HOME | DRG: 309 | End: 2021-10-30
Attending: EMERGENCY MEDICINE | Admitting: HOSPITALIST
Payer: MEDICARE

## 2021-10-25 VITALS — WEIGHT: 248 LBS | HEIGHT: 65 IN | BODY MASS INDEX: 41.32 KG/M2

## 2021-10-25 VITALS — WEIGHT: 255 LBS | HEIGHT: 62 IN | BODY MASS INDEX: 46.93 KG/M2

## 2021-10-25 DIAGNOSIS — R00.2 PALPITATIONS: ICD-10-CM

## 2021-10-25 DIAGNOSIS — R07.9 CHEST PAIN: ICD-10-CM

## 2021-10-25 DIAGNOSIS — R06.09 DOE (DYSPNEA ON EXERTION): ICD-10-CM

## 2021-10-25 DIAGNOSIS — I48.91 ATRIAL FIBRILLATION WITH RVR: Primary | ICD-10-CM

## 2021-10-25 DIAGNOSIS — I48.91 NEW ONSET A-FIB: ICD-10-CM

## 2021-10-25 LAB
ALBUMIN SERPL BCP-MCNC: 3.9 G/DL (ref 3.5–5.2)
ALP SERPL-CCNC: 82 U/L (ref 55–135)
ALT SERPL W/O P-5'-P-CCNC: 38 U/L (ref 10–44)
ANION GAP SERPL CALC-SCNC: 9 MMOL/L (ref 8–16)
AORTIC ROOT ANNULUS: 2.91 CM
AST SERPL-CCNC: 32 U/L (ref 10–40)
AV INDEX (PROSTH): 0.46
AV MEAN GRADIENT: 6 MMHG
AV PEAK GRADIENT: 9 MMHG
AV VALVE AREA: 1.38 CM2
AV VELOCITY RATIO: 51.12
BASOPHILS # BLD AUTO: 0.06 K/UL (ref 0–0.2)
BASOPHILS NFR BLD: 0.6 % (ref 0–1.9)
BILIRUB SERPL-MCNC: 1.2 MG/DL (ref 0.1–1)
BNP SERPL-MCNC: 177 PG/ML (ref 0–99)
BSA FOR ECHO PROCEDURE: 2.25 M2
BUN SERPL-MCNC: 16 MG/DL (ref 8–23)
CALCIUM SERPL-MCNC: 8.5 MG/DL (ref 8.7–10.5)
CHLORIDE SERPL-SCNC: 104 MMOL/L (ref 95–110)
CO2 SERPL-SCNC: 28 MMOL/L (ref 23–29)
CREAT SERPL-MCNC: 0.8 MG/DL (ref 0.5–1.4)
CV ECHO LV RWT: 0.46 CM
DIFFERENTIAL METHOD: ABNORMAL
DOP CALC AO PEAK VEL: 1.53 M/S
DOP CALC AO VTI: 28.33 CM
DOP CALC LVOT AREA: 3 CM2
DOP CALC LVOT DIAMETER: 1.96 CM
DOP CALC LVOT PEAK VEL: 78.22 M/S
DOP CALC LVOT STROKE VOLUME: 38.99 CM3
DOP CALCLVOT PEAK VEL VTI: 12.93 CM
E WAVE DECELERATION TIME: 255.08 MSEC
E/E' RATIO: 17.38 M/S
ECHO LV POSTERIOR WALL: 1.16 CM (ref 0.6–1.1)
EJECTION FRACTION: 29 %
EOSINOPHIL # BLD AUTO: 0.1 K/UL (ref 0–0.5)
EOSINOPHIL NFR BLD: 1.3 % (ref 0–8)
ERYTHROCYTE [DISTWIDTH] IN BLOOD BY AUTOMATED COUNT: 14.1 % (ref 11.5–14.5)
EST. GFR  (AFRICAN AMERICAN): >60 ML/MIN/1.73 M^2
EST. GFR  (NON AFRICAN AMERICAN): >60 ML/MIN/1.73 M^2
FRACTIONAL SHORTENING: 11 % (ref 28–44)
GLUCOSE SERPL-MCNC: 138 MG/DL (ref 70–110)
HCT VFR BLD AUTO: 44.9 % (ref 37–48.5)
HGB BLD-MCNC: 13.9 G/DL (ref 12–16)
IMM GRANULOCYTES # BLD AUTO: 0.02 K/UL (ref 0–0.04)
IMM GRANULOCYTES NFR BLD AUTO: 0.2 % (ref 0–0.5)
INR PPP: 1.1
INTERVENTRICULAR SEPTUM: 1.28 CM (ref 0.6–1.1)
LEFT ATRIUM SIZE: 4.96 CM
LEFT INTERNAL DIMENSION IN SYSTOLE: 4.45 CM (ref 2.1–4)
LEFT VENTRICLE MASS INDEX: 113 G/M2
LEFT VENTRICULAR INTERNAL DIMENSION IN DIASTOLE: 5 CM (ref 3.5–6)
LEFT VENTRICULAR MASS: 239.24 G
LV LATERAL E/E' RATIO: 15.44 M/S
LV SEPTAL E/E' RATIO: 19.86 M/S
LYMPHOCYTES # BLD AUTO: 2 K/UL (ref 1–4.8)
LYMPHOCYTES NFR BLD: 22 % (ref 18–48)
MAGNESIUM SERPL-MCNC: 1.9 MG/DL (ref 1.6–2.6)
MCH RBC QN AUTO: 29.8 PG (ref 27–31)
MCHC RBC AUTO-ENTMCNC: 31 G/DL (ref 32–36)
MCV RBC AUTO: 96 FL (ref 82–98)
MONOCYTES # BLD AUTO: 0.6 K/UL (ref 0.3–1)
MONOCYTES NFR BLD: 6.6 % (ref 4–15)
MV PEAK E VEL: 1.39 M/S
NEUTROPHILS # BLD AUTO: 6.4 K/UL (ref 1.8–7.7)
NEUTROPHILS NFR BLD: 69.3 % (ref 38–73)
NRBC BLD-RTO: 0 /100 WBC
PHOSPHATE SERPL-MCNC: 3.3 MG/DL (ref 2.7–4.5)
PISA TR MAX VEL: 2.35 M/S
PLATELET # BLD AUTO: 266 K/UL (ref 150–450)
PMV BLD AUTO: 11.4 FL (ref 9.2–12.9)
POTASSIUM SERPL-SCNC: 4 MMOL/L (ref 3.5–5.1)
PROT SERPL-MCNC: 6.7 G/DL (ref 6–8.4)
PROTHROMBIN TIME: 13.8 SEC (ref 11.4–13.7)
PV PEAK VELOCITY: 63.56 CM/S
RA PRESSURE: 3 MMHG
RBC # BLD AUTO: 4.66 M/UL (ref 4–5.4)
SARS-COV-2 RDRP RESP QL NAA+PROBE: NEGATIVE
SODIUM SERPL-SCNC: 141 MMOL/L (ref 136–145)
TDI LATERAL: 0.09 M/S
TDI SEPTAL: 0.07 M/S
TDI: 0.08 M/S
TR MAX PG: 22 MMHG
TROPONIN I SERPL DL<=0.01 NG/ML-MCNC: <0.03 NG/ML
TSH SERPL DL<=0.005 MIU/L-ACNC: 3.87 UIU/ML (ref 0.34–5.6)
TV REST PULMONARY ARTERY PRESSURE: 25 MMHG
WBC # BLD AUTO: 9.24 K/UL (ref 3.9–12.7)

## 2021-10-25 PROCEDURE — G0378 HOSPITAL OBSERVATION PER HR: HCPCS

## 2021-10-25 PROCEDURE — 36415 COLL VENOUS BLD VENIPUNCTURE: CPT | Performed by: HOSPITALIST

## 2021-10-25 PROCEDURE — 93306 TTE W/DOPPLER COMPLETE: CPT

## 2021-10-25 PROCEDURE — 84484 ASSAY OF TROPONIN QUANT: CPT | Performed by: EMERGENCY MEDICINE

## 2021-10-25 PROCEDURE — 83880 ASSAY OF NATRIURETIC PEPTIDE: CPT | Performed by: EMERGENCY MEDICINE

## 2021-10-25 PROCEDURE — 84100 ASSAY OF PHOSPHORUS: CPT | Performed by: HOSPITALIST

## 2021-10-25 PROCEDURE — 96376 TX/PRO/DX INJ SAME DRUG ADON: CPT

## 2021-10-25 PROCEDURE — 84443 ASSAY THYROID STIM HORMONE: CPT | Performed by: HOSPITALIST

## 2021-10-25 PROCEDURE — 83735 ASSAY OF MAGNESIUM: CPT | Performed by: HOSPITALIST

## 2021-10-25 PROCEDURE — 99291 CRITICAL CARE FIRST HOUR: CPT

## 2021-10-25 PROCEDURE — 94729 DIFFUSING CAPACITY: CPT

## 2021-10-25 PROCEDURE — 96372 THER/PROPH/DIAG INJ SC/IM: CPT | Mod: 59

## 2021-10-25 PROCEDURE — U0002 COVID-19 LAB TEST NON-CDC: HCPCS | Performed by: EMERGENCY MEDICINE

## 2021-10-25 PROCEDURE — 94761 N-INVAS EAR/PLS OXIMETRY MLT: CPT

## 2021-10-25 PROCEDURE — 93005 ELECTROCARDIOGRAM TRACING: CPT | Performed by: SPECIALIST

## 2021-10-25 PROCEDURE — 85025 COMPLETE CBC W/AUTO DIFF WBC: CPT | Performed by: EMERGENCY MEDICINE

## 2021-10-25 PROCEDURE — 93010 EKG 12-LEAD: ICD-10-PCS | Mod: ,,, | Performed by: SPECIALIST

## 2021-10-25 PROCEDURE — 94618 PULMONARY STRESS TESTING: CPT

## 2021-10-25 PROCEDURE — 85610 PROTHROMBIN TIME: CPT | Performed by: EMERGENCY MEDICINE

## 2021-10-25 PROCEDURE — 25000003 PHARM REV CODE 250: Performed by: HOSPITALIST

## 2021-10-25 PROCEDURE — 93306 TTE W/DOPPLER COMPLETE: CPT | Mod: 26,,, | Performed by: SPECIALIST

## 2021-10-25 PROCEDURE — 93010 ELECTROCARDIOGRAM REPORT: CPT | Mod: ,,, | Performed by: SPECIALIST

## 2021-10-25 PROCEDURE — 94060 EVALUATION OF WHEEZING: CPT

## 2021-10-25 PROCEDURE — 99900035 HC TECH TIME PER 15 MIN (STAT)

## 2021-10-25 PROCEDURE — 80053 COMPREHEN METABOLIC PANEL: CPT | Performed by: EMERGENCY MEDICINE

## 2021-10-25 PROCEDURE — 25000003 PHARM REV CODE 250: Performed by: EMERGENCY MEDICINE

## 2021-10-25 PROCEDURE — 63600175 PHARM REV CODE 636 W HCPCS: Performed by: HOSPITALIST

## 2021-10-25 PROCEDURE — 94727 GAS DIL/WSHOT DETER LNG VOL: CPT

## 2021-10-25 PROCEDURE — 63600175 PHARM REV CODE 636 W HCPCS: Performed by: EMERGENCY MEDICINE

## 2021-10-25 PROCEDURE — 96375 TX/PRO/DX INJ NEW DRUG ADDON: CPT

## 2021-10-25 PROCEDURE — 94010 BREATHING CAPACITY TEST: CPT

## 2021-10-25 PROCEDURE — 93306 ECHO (CUPID ONLY): ICD-10-PCS | Mod: 26,,, | Performed by: SPECIALIST

## 2021-10-25 RX ORDER — ENOXAPARIN SODIUM 100 MG/ML
1 INJECTION SUBCUTANEOUS
Status: COMPLETED | OUTPATIENT
Start: 2021-10-25 | End: 2021-10-25

## 2021-10-25 RX ORDER — ENOXAPARIN SODIUM 100 MG/ML
1 INJECTION SUBCUTANEOUS
Status: DISCONTINUED | OUTPATIENT
Start: 2021-10-25 | End: 2021-10-29

## 2021-10-25 RX ORDER — LANOLIN ALCOHOL/MO/W.PET/CERES
800 CREAM (GRAM) TOPICAL
Status: DISCONTINUED | OUTPATIENT
Start: 2021-10-25 | End: 2021-10-30 | Stop reason: HOSPADM

## 2021-10-25 RX ORDER — DILTIAZEM HYDROCHLORIDE 5 MG/ML
10 INJECTION INTRAVENOUS
Status: COMPLETED | OUTPATIENT
Start: 2021-10-25 | End: 2021-10-25

## 2021-10-25 RX ORDER — ACETAMINOPHEN 500 MG
5000 TABLET ORAL DAILY
COMMUNITY
End: 2023-03-01

## 2021-10-25 RX ORDER — POTASSIUM CHLORIDE 7.45 MG/ML
20 INJECTION INTRAVENOUS
Status: DISCONTINUED | OUTPATIENT
Start: 2021-10-25 | End: 2021-10-30 | Stop reason: HOSPADM

## 2021-10-25 RX ORDER — LOSARTAN POTASSIUM 50 MG/1
50 TABLET ORAL DAILY
Refills: 3 | Status: DISCONTINUED | OUTPATIENT
Start: 2021-10-25 | End: 2021-10-30 | Stop reason: HOSPADM

## 2021-10-25 RX ORDER — POTASSIUM CHLORIDE 20 MEQ/1
40 TABLET, EXTENDED RELEASE ORAL
Status: DISCONTINUED | OUTPATIENT
Start: 2021-10-25 | End: 2021-10-30 | Stop reason: HOSPADM

## 2021-10-25 RX ORDER — ACETAMINOPHEN 500 MG
5000 TABLET ORAL DAILY
Status: DISCONTINUED | OUTPATIENT
Start: 2021-10-26 | End: 2021-10-30 | Stop reason: HOSPADM

## 2021-10-25 RX ORDER — MAGNESIUM SULFATE 1 G/100ML
1 INJECTION INTRAVENOUS
Status: DISCONTINUED | OUTPATIENT
Start: 2021-10-25 | End: 2021-10-30 | Stop reason: HOSPADM

## 2021-10-25 RX ORDER — ONDANSETRON 2 MG/ML
4 INJECTION INTRAMUSCULAR; INTRAVENOUS EVERY 8 HOURS PRN
Status: DISCONTINUED | OUTPATIENT
Start: 2021-10-25 | End: 2021-10-30 | Stop reason: HOSPADM

## 2021-10-25 RX ORDER — ACETAMINOPHEN 325 MG/1
650 TABLET ORAL EVERY 4 HOURS PRN
Status: DISCONTINUED | OUTPATIENT
Start: 2021-10-25 | End: 2021-10-30 | Stop reason: HOSPADM

## 2021-10-25 RX ORDER — METOPROLOL TARTRATE 25 MG/1
25 TABLET, FILM COATED ORAL 2 TIMES DAILY
Status: DISCONTINUED | OUTPATIENT
Start: 2021-10-25 | End: 2021-10-29

## 2021-10-25 RX ORDER — NALOXONE HCL 0.4 MG/ML
0.02 VIAL (ML) INJECTION
Status: DISCONTINUED | OUTPATIENT
Start: 2021-10-25 | End: 2021-10-30 | Stop reason: HOSPADM

## 2021-10-25 RX ORDER — POTASSIUM CHLORIDE 7.45 MG/ML
40 INJECTION INTRAVENOUS
Status: DISCONTINUED | OUTPATIENT
Start: 2021-10-25 | End: 2021-10-30 | Stop reason: HOSPADM

## 2021-10-25 RX ORDER — HYDROCODONE BITARTRATE AND ACETAMINOPHEN 5; 325 MG/1; MG/1
1 TABLET ORAL EVERY 6 HOURS PRN
Status: DISCONTINUED | OUTPATIENT
Start: 2021-10-25 | End: 2021-10-30 | Stop reason: HOSPADM

## 2021-10-25 RX ORDER — MAGNESIUM SULFATE HEPTAHYDRATE 40 MG/ML
2 INJECTION, SOLUTION INTRAVENOUS
Status: DISCONTINUED | OUTPATIENT
Start: 2021-10-25 | End: 2021-10-30 | Stop reason: HOSPADM

## 2021-10-25 RX ORDER — LANOLIN ALCOHOL/MO/W.PET/CERES
400 CREAM (GRAM) TOPICAL 2 TIMES DAILY
Status: DISCONTINUED | OUTPATIENT
Start: 2021-10-25 | End: 2021-10-30 | Stop reason: HOSPADM

## 2021-10-25 RX ORDER — POLYETHYLENE GLYCOL 3350 17 G/17G
17 POWDER, FOR SOLUTION ORAL DAILY
Status: DISCONTINUED | OUTPATIENT
Start: 2021-10-25 | End: 2021-10-30 | Stop reason: HOSPADM

## 2021-10-25 RX ORDER — MORPHINE SULFATE 4 MG/ML
4 INJECTION, SOLUTION INTRAMUSCULAR; INTRAVENOUS EVERY 4 HOURS PRN
Status: DISCONTINUED | OUTPATIENT
Start: 2021-10-25 | End: 2021-10-30 | Stop reason: HOSPADM

## 2021-10-25 RX ORDER — MAGNESIUM SULFATE HEPTAHYDRATE 40 MG/ML
4 INJECTION, SOLUTION INTRAVENOUS
Status: DISCONTINUED | OUTPATIENT
Start: 2021-10-25 | End: 2021-10-30 | Stop reason: HOSPADM

## 2021-10-25 RX ORDER — POTASSIUM CHLORIDE 20 MEQ/1
20 TABLET, EXTENDED RELEASE ORAL
Status: DISCONTINUED | OUTPATIENT
Start: 2021-10-25 | End: 2021-10-30 | Stop reason: HOSPADM

## 2021-10-25 RX ORDER — SODIUM CHLORIDE 0.9 % (FLUSH) 0.9 %
10 SYRINGE (ML) INJECTION EVERY 12 HOURS PRN
Status: DISCONTINUED | OUTPATIENT
Start: 2021-10-25 | End: 2021-10-30 | Stop reason: HOSPADM

## 2021-10-25 RX ORDER — POTASSIUM CHLORIDE 20 MEQ/1
20 TABLET, EXTENDED RELEASE ORAL 2 TIMES DAILY
Status: DISCONTINUED | OUTPATIENT
Start: 2021-10-25 | End: 2021-10-30 | Stop reason: HOSPADM

## 2021-10-25 RX ORDER — PNV NO.95/FERROUS FUM/FOLIC AC 28MG-0.8MG
1000 TABLET ORAL DAILY
COMMUNITY

## 2021-10-25 RX ORDER — SIMETHICONE 80 MG
1 TABLET,CHEWABLE ORAL 4 TIMES DAILY PRN
Status: DISCONTINUED | OUTPATIENT
Start: 2021-10-25 | End: 2021-10-30 | Stop reason: HOSPADM

## 2021-10-25 RX ORDER — POTASSIUM CHLORIDE 20 MEQ/1
20 TABLET, EXTENDED RELEASE ORAL 2 TIMES DAILY
COMMUNITY

## 2021-10-25 RX ORDER — AMOXICILLIN 500 MG
1 CAPSULE ORAL DAILY
COMMUNITY

## 2021-10-25 RX ORDER — TALC
6 POWDER (GRAM) TOPICAL NIGHTLY PRN
Status: DISCONTINUED | OUTPATIENT
Start: 2021-10-25 | End: 2021-10-30 | Stop reason: HOSPADM

## 2021-10-25 RX ORDER — ASPIRIN 325 MG
325 TABLET ORAL DAILY
COMMUNITY

## 2021-10-25 RX ADMIN — POTASSIUM CHLORIDE 20 MEQ: 20 TABLET, EXTENDED RELEASE ORAL at 11:10

## 2021-10-25 RX ADMIN — DEXTROSE MONOHYDRATE 10 MG/HR: 50 INJECTION, SOLUTION INTRAVENOUS at 10:10

## 2021-10-25 RX ADMIN — ENOXAPARIN SODIUM 120 MG: 60 INJECTION SUBCUTANEOUS at 11:10

## 2021-10-25 RX ADMIN — METOPROLOL TARTRATE 25 MG: 25 TABLET, FILM COATED ORAL at 11:10

## 2021-10-25 RX ADMIN — DEXTROSE MONOHYDRATE 5 MG/HR: 50 INJECTION, SOLUTION INTRAVENOUS at 08:10

## 2021-10-25 RX ADMIN — MAGNESIUM OXIDE 400 MG: 400 TABLET ORAL at 11:10

## 2021-10-25 RX ADMIN — METOPROLOL TARTRATE 25 MG: 25 TABLET, FILM COATED ORAL at 01:10

## 2021-10-25 RX ADMIN — DILTIAZEM HYDROCHLORIDE 10 MG: 5 INJECTION INTRAVENOUS at 08:10

## 2021-10-25 RX ADMIN — ENOXAPARIN SODIUM 110 MG: 100 INJECTION SUBCUTANEOUS at 11:10

## 2021-10-25 RX ADMIN — LOSARTAN POTASSIUM 50 MG: 50 TABLET, FILM COATED ORAL at 01:10

## 2021-10-25 RX ADMIN — DEXTROSE MONOHYDRATE 10 MG/HR: 50 INJECTION, SOLUTION INTRAVENOUS at 04:10

## 2021-10-26 LAB
ALBUMIN SERPL BCP-MCNC: 3.7 G/DL (ref 3.5–5.2)
ALP SERPL-CCNC: 74 U/L (ref 55–135)
ALT SERPL W/O P-5'-P-CCNC: 34 U/L (ref 10–44)
ANION GAP SERPL CALC-SCNC: 11 MMOL/L (ref 8–16)
AST SERPL-CCNC: 23 U/L (ref 10–40)
BASOPHILS # BLD AUTO: 0.07 K/UL (ref 0–0.2)
BASOPHILS NFR BLD: 0.9 % (ref 0–1.9)
BILIRUB SERPL-MCNC: 0.6 MG/DL (ref 0.1–1)
BUN SERPL-MCNC: 20 MG/DL (ref 8–23)
CALCIUM SERPL-MCNC: 8.9 MG/DL (ref 8.7–10.5)
CHLORIDE SERPL-SCNC: 106 MMOL/L (ref 95–110)
CO2 SERPL-SCNC: 26 MMOL/L (ref 23–29)
CREAT SERPL-MCNC: 0.8 MG/DL (ref 0.5–1.4)
DIFFERENTIAL METHOD: ABNORMAL
EOSINOPHIL # BLD AUTO: 0.2 K/UL (ref 0–0.5)
EOSINOPHIL NFR BLD: 2.4 % (ref 0–8)
ERYTHROCYTE [DISTWIDTH] IN BLOOD BY AUTOMATED COUNT: 14 % (ref 11.5–14.5)
EST. GFR  (AFRICAN AMERICAN): >60 ML/MIN/1.73 M^2
EST. GFR  (NON AFRICAN AMERICAN): >60 ML/MIN/1.73 M^2
GLUCOSE SERPL-MCNC: 121 MG/DL (ref 70–110)
HCT VFR BLD AUTO: 42.7 % (ref 37–48.5)
HGB BLD-MCNC: 13.2 G/DL (ref 12–16)
IMM GRANULOCYTES # BLD AUTO: 0.02 K/UL (ref 0–0.04)
IMM GRANULOCYTES NFR BLD AUTO: 0.3 % (ref 0–0.5)
LYMPHOCYTES # BLD AUTO: 2.3 K/UL (ref 1–4.8)
LYMPHOCYTES NFR BLD: 29.6 % (ref 18–48)
MAGNESIUM SERPL-MCNC: 2.3 MG/DL (ref 1.6–2.6)
MCH RBC QN AUTO: 29.7 PG (ref 27–31)
MCHC RBC AUTO-ENTMCNC: 30.9 G/DL (ref 32–36)
MCV RBC AUTO: 96 FL (ref 82–98)
MONOCYTES # BLD AUTO: 0.8 K/UL (ref 0.3–1)
MONOCYTES NFR BLD: 10 % (ref 4–15)
NEUTROPHILS # BLD AUTO: 4.3 K/UL (ref 1.8–7.7)
NEUTROPHILS NFR BLD: 56.8 % (ref 38–73)
NRBC BLD-RTO: 0 /100 WBC
PHOSPHATE SERPL-MCNC: 3.5 MG/DL (ref 2.7–4.5)
PLATELET # BLD AUTO: 249 K/UL (ref 150–450)
PMV BLD AUTO: 11.5 FL (ref 9.2–12.9)
POTASSIUM SERPL-SCNC: 4.3 MMOL/L (ref 3.5–5.1)
PROT SERPL-MCNC: 6.6 G/DL (ref 6–8.4)
RBC # BLD AUTO: 4.44 M/UL (ref 4–5.4)
SODIUM SERPL-SCNC: 143 MMOL/L (ref 136–145)
WBC # BLD AUTO: 7.59 K/UL (ref 3.9–12.7)

## 2021-10-26 PROCEDURE — 80053 COMPREHEN METABOLIC PANEL: CPT | Performed by: HOSPITALIST

## 2021-10-26 PROCEDURE — 85025 COMPLETE CBC W/AUTO DIFF WBC: CPT | Performed by: HOSPITALIST

## 2021-10-26 PROCEDURE — 96376 TX/PRO/DX INJ SAME DRUG ADON: CPT

## 2021-10-26 PROCEDURE — 63600175 PHARM REV CODE 636 W HCPCS: Performed by: NURSE PRACTITIONER

## 2021-10-26 PROCEDURE — 25000003 PHARM REV CODE 250: Performed by: HOSPITALIST

## 2021-10-26 PROCEDURE — 21000000 HC CCU ICU ROOM CHARGE

## 2021-10-26 PROCEDURE — 63600175 PHARM REV CODE 636 W HCPCS: Performed by: HOSPITALIST

## 2021-10-26 PROCEDURE — 96374 THER/PROPH/DIAG INJ IV PUSH: CPT | Mod: 59

## 2021-10-26 PROCEDURE — 99223 1ST HOSP IP/OBS HIGH 75: CPT | Mod: ,,, | Performed by: INTERNAL MEDICINE

## 2021-10-26 PROCEDURE — 27000221 HC OXYGEN, UP TO 24 HOURS

## 2021-10-26 PROCEDURE — 83735 ASSAY OF MAGNESIUM: CPT | Performed by: HOSPITALIST

## 2021-10-26 PROCEDURE — 36415 COLL VENOUS BLD VENIPUNCTURE: CPT | Performed by: HOSPITALIST

## 2021-10-26 PROCEDURE — 96372 THER/PROPH/DIAG INJ SC/IM: CPT | Mod: 59

## 2021-10-26 PROCEDURE — 99223 PR INITIAL HOSPITAL CARE,LEVL III: ICD-10-PCS | Mod: ,,, | Performed by: INTERNAL MEDICINE

## 2021-10-26 PROCEDURE — 94761 N-INVAS EAR/PLS OXIMETRY MLT: CPT

## 2021-10-26 PROCEDURE — 84100 ASSAY OF PHOSPHORUS: CPT | Performed by: HOSPITALIST

## 2021-10-26 RX ORDER — FUROSEMIDE 10 MG/ML
40 INJECTION INTRAMUSCULAR; INTRAVENOUS
Status: DISCONTINUED | OUTPATIENT
Start: 2021-10-26 | End: 2021-10-30 | Stop reason: HOSPADM

## 2021-10-26 RX ADMIN — MAGNESIUM OXIDE 400 MG: 400 TABLET ORAL at 08:10

## 2021-10-26 RX ADMIN — METOPROLOL TARTRATE 25 MG: 25 TABLET, FILM COATED ORAL at 08:10

## 2021-10-26 RX ADMIN — POTASSIUM CHLORIDE 20 MEQ: 20 TABLET, EXTENDED RELEASE ORAL at 08:10

## 2021-10-26 RX ADMIN — DEXTROSE MONOHYDRATE 5 MG/HR: 50 INJECTION, SOLUTION INTRAVENOUS at 03:10

## 2021-10-26 RX ADMIN — Medication 5000 UNITS: at 08:10

## 2021-10-26 RX ADMIN — MELATONIN 6 MG: at 08:10

## 2021-10-26 RX ADMIN — FUROSEMIDE 40 MG: 10 INJECTION, SOLUTION INTRAMUSCULAR; INTRAVENOUS at 12:10

## 2021-10-26 RX ADMIN — ENOXAPARIN SODIUM 120 MG: 60 INJECTION SUBCUTANEOUS at 12:10

## 2021-10-26 RX ADMIN — LOSARTAN POTASSIUM 50 MG: 50 TABLET, FILM COATED ORAL at 08:10

## 2021-10-27 ENCOUNTER — ANESTHESIA (OUTPATIENT)
Dept: CARDIOLOGY | Facility: HOSPITAL | Age: 66
DRG: 309 | End: 2021-10-27
Payer: MEDICARE

## 2021-10-27 ENCOUNTER — CLINICAL SUPPORT (OUTPATIENT)
Dept: CARDIOLOGY | Facility: HOSPITAL | Age: 66
DRG: 309 | End: 2021-10-27
Payer: MEDICARE

## 2021-10-27 ENCOUNTER — ANESTHESIA EVENT (OUTPATIENT)
Dept: CARDIOLOGY | Facility: HOSPITAL | Age: 66
DRG: 309 | End: 2021-10-27
Payer: MEDICARE

## 2021-10-27 VITALS — WEIGHT: 257.5 LBS | HEIGHT: 62 IN | BODY MASS INDEX: 47.39 KG/M2

## 2021-10-27 LAB
ALBUMIN SERPL BCP-MCNC: 3.8 G/DL (ref 3.5–5.2)
ALP SERPL-CCNC: 73 U/L (ref 55–135)
ALT SERPL W/O P-5'-P-CCNC: 30 U/L (ref 10–44)
ANION GAP SERPL CALC-SCNC: 10 MMOL/L (ref 8–16)
AST SERPL-CCNC: 18 U/L (ref 10–40)
BASOPHILS # BLD AUTO: 0.07 K/UL (ref 0–0.2)
BASOPHILS NFR BLD: 0.9 % (ref 0–1.9)
BILIRUB SERPL-MCNC: 1 MG/DL (ref 0.1–1)
BUN SERPL-MCNC: 18 MG/DL (ref 8–23)
CALCIUM SERPL-MCNC: 8.6 MG/DL (ref 8.7–10.5)
CHLORIDE SERPL-SCNC: 104 MMOL/L (ref 95–110)
CO2 SERPL-SCNC: 28 MMOL/L (ref 23–29)
CREAT SERPL-MCNC: 0.8 MG/DL (ref 0.5–1.4)
DIFFERENTIAL METHOD: ABNORMAL
EOSINOPHIL # BLD AUTO: 0.1 K/UL (ref 0–0.5)
EOSINOPHIL NFR BLD: 1.7 % (ref 0–8)
ERYTHROCYTE [DISTWIDTH] IN BLOOD BY AUTOMATED COUNT: 13.8 % (ref 11.5–14.5)
EST. GFR  (AFRICAN AMERICAN): >60 ML/MIN/1.73 M^2
EST. GFR  (NON AFRICAN AMERICAN): >60 ML/MIN/1.73 M^2
GLUCOSE SERPL-MCNC: 120 MG/DL (ref 70–110)
HCT VFR BLD AUTO: 44.1 % (ref 37–48.5)
HGB BLD-MCNC: 13.6 G/DL (ref 12–16)
IMM GRANULOCYTES # BLD AUTO: 0.03 K/UL (ref 0–0.04)
IMM GRANULOCYTES NFR BLD AUTO: 0.4 % (ref 0–0.5)
LYMPHOCYTES # BLD AUTO: 1.8 K/UL (ref 1–4.8)
LYMPHOCYTES NFR BLD: 22.9 % (ref 18–48)
MAGNESIUM SERPL-MCNC: 2.1 MG/DL (ref 1.6–2.6)
MCH RBC QN AUTO: 30.3 PG (ref 27–31)
MCHC RBC AUTO-ENTMCNC: 30.8 G/DL (ref 32–36)
MCV RBC AUTO: 98 FL (ref 82–98)
MONOCYTES # BLD AUTO: 0.8 K/UL (ref 0.3–1)
MONOCYTES NFR BLD: 10 % (ref 4–15)
NEUTROPHILS # BLD AUTO: 5.2 K/UL (ref 1.8–7.7)
NEUTROPHILS NFR BLD: 64.1 % (ref 38–73)
NRBC BLD-RTO: 0 /100 WBC
PHOSPHATE SERPL-MCNC: 4.2 MG/DL (ref 2.7–4.5)
PLATELET # BLD AUTO: 275 K/UL (ref 150–450)
PMV BLD AUTO: 11.7 FL (ref 9.2–12.9)
POTASSIUM SERPL-SCNC: 4.4 MMOL/L (ref 3.5–5.1)
PROT SERPL-MCNC: 6.5 G/DL (ref 6–8.4)
RBC # BLD AUTO: 4.49 M/UL (ref 4–5.4)
SODIUM SERPL-SCNC: 142 MMOL/L (ref 136–145)
WBC # BLD AUTO: 8.03 K/UL (ref 3.9–12.7)

## 2021-10-27 PROCEDURE — 99900031 HC PATIENT EDUCATION (STAT)

## 2021-10-27 PROCEDURE — 25000003 PHARM REV CODE 250: Performed by: NURSE ANESTHETIST, CERTIFIED REGISTERED

## 2021-10-27 PROCEDURE — 93312 TRANSESOPHAGEAL ECHO (TEE) W/ POSSIBLE CARDIOVERSION: ICD-10-PCS | Mod: 26,,, | Performed by: INTERNAL MEDICINE

## 2021-10-27 PROCEDURE — 27202103: Performed by: ANESTHESIOLOGY

## 2021-10-27 PROCEDURE — 80053 COMPREHEN METABOLIC PANEL: CPT | Performed by: HOSPITALIST

## 2021-10-27 PROCEDURE — 93312 ECHO TRANSESOPHAGEAL: CPT | Mod: 26,,, | Performed by: INTERNAL MEDICINE

## 2021-10-27 PROCEDURE — 83735 ASSAY OF MAGNESIUM: CPT | Performed by: HOSPITALIST

## 2021-10-27 PROCEDURE — 63600175 PHARM REV CODE 636 W HCPCS: Performed by: NURSE ANESTHETIST, CERTIFIED REGISTERED

## 2021-10-27 PROCEDURE — 99232 PR SUBSEQUENT HOSPITAL CARE,LEVL II: ICD-10-PCS | Mod: ,,, | Performed by: INTERNAL MEDICINE

## 2021-10-27 PROCEDURE — 25000003 PHARM REV CODE 250: Performed by: HOSPITALIST

## 2021-10-27 PROCEDURE — 63600175 PHARM REV CODE 636 W HCPCS: Performed by: HOSPITALIST

## 2021-10-27 PROCEDURE — 94761 N-INVAS EAR/PLS OXIMETRY MLT: CPT

## 2021-10-27 PROCEDURE — 27000221 HC OXYGEN, UP TO 24 HOURS

## 2021-10-27 PROCEDURE — 99232 SBSQ HOSP IP/OBS MODERATE 35: CPT | Mod: ,,, | Performed by: INTERNAL MEDICINE

## 2021-10-27 PROCEDURE — 85025 COMPLETE CBC W/AUTO DIFF WBC: CPT | Performed by: HOSPITALIST

## 2021-10-27 PROCEDURE — 93325 TRANSESOPHAGEAL ECHO (TEE) W/ POSSIBLE CARDIOVERSION: ICD-10-PCS | Mod: 26,,, | Performed by: INTERNAL MEDICINE

## 2021-10-27 PROCEDURE — 93321 TRANSESOPHAGEAL ECHO (TEE) W/ POSSIBLE CARDIOVERSION: ICD-10-PCS | Mod: 26,,, | Performed by: INTERNAL MEDICINE

## 2021-10-27 PROCEDURE — 25000003 PHARM REV CODE 250: Performed by: INTERNAL MEDICINE

## 2021-10-27 PROCEDURE — 93325 DOPPLER ECHO COLOR FLOW MAPG: CPT | Mod: 26,,, | Performed by: INTERNAL MEDICINE

## 2021-10-27 PROCEDURE — 36415 COLL VENOUS BLD VENIPUNCTURE: CPT | Performed by: HOSPITALIST

## 2021-10-27 PROCEDURE — 21000000 HC CCU ICU ROOM CHARGE

## 2021-10-27 PROCEDURE — 99900035 HC TECH TIME PER 15 MIN (STAT)

## 2021-10-27 PROCEDURE — 63600175 PHARM REV CODE 636 W HCPCS: Performed by: NURSE PRACTITIONER

## 2021-10-27 PROCEDURE — 84100 ASSAY OF PHOSPHORUS: CPT | Performed by: HOSPITALIST

## 2021-10-27 PROCEDURE — 93325 DOPPLER ECHO COLOR FLOW MAPG: CPT

## 2021-10-27 PROCEDURE — 93321 DOPPLER ECHO F-UP/LMTD STD: CPT | Mod: 26,,, | Performed by: INTERNAL MEDICINE

## 2021-10-27 RX ORDER — NALOXONE HYDROCHLORIDE 1 MG/ML
INJECTION INTRAMUSCULAR; INTRAVENOUS; SUBCUTANEOUS
Status: DISCONTINUED
Start: 2021-10-27 | End: 2021-10-27 | Stop reason: WASHOUT

## 2021-10-27 RX ORDER — DILTIAZEM HYDROCHLORIDE 180 MG/1
180 CAPSULE, COATED, EXTENDED RELEASE ORAL DAILY
Status: DISCONTINUED | OUTPATIENT
Start: 2021-10-27 | End: 2021-10-28

## 2021-10-27 RX ORDER — PROPOFOL 10 MG/ML
INJECTION, EMULSION INTRAVENOUS
Status: DISCONTINUED | OUTPATIENT
Start: 2021-10-27 | End: 2021-10-27

## 2021-10-27 RX ORDER — EPINEPHRINE 0.1 MG/ML
INJECTION INTRAVENOUS
Status: DISCONTINUED
Start: 2021-10-27 | End: 2021-10-27 | Stop reason: WASHOUT

## 2021-10-27 RX ORDER — ATROPINE SULFATE 0.1 MG/ML
INJECTION INTRAVENOUS
Status: DISCONTINUED
Start: 2021-10-27 | End: 2021-10-27 | Stop reason: WASHOUT

## 2021-10-27 RX ADMIN — SODIUM CHLORIDE: 9 INJECTION, SOLUTION INTRAVENOUS at 10:10

## 2021-10-27 RX ADMIN — FUROSEMIDE 40 MG: 10 INJECTION, SOLUTION INTRAMUSCULAR; INTRAVENOUS at 12:10

## 2021-10-27 RX ADMIN — METOPROLOL TARTRATE 25 MG: 25 TABLET, FILM COATED ORAL at 12:10

## 2021-10-27 RX ADMIN — METOPROLOL TARTRATE 25 MG: 25 TABLET, FILM COATED ORAL at 08:10

## 2021-10-27 RX ADMIN — POTASSIUM CHLORIDE 20 MEQ: 20 TABLET, EXTENDED RELEASE ORAL at 12:10

## 2021-10-27 RX ADMIN — POTASSIUM CHLORIDE 20 MEQ: 20 TABLET, EXTENDED RELEASE ORAL at 08:10

## 2021-10-27 RX ADMIN — PROPOFOL 50 MG: 10 INJECTION, EMULSION INTRAVENOUS at 10:10

## 2021-10-27 RX ADMIN — LOSARTAN POTASSIUM 50 MG: 50 TABLET, FILM COATED ORAL at 12:10

## 2021-10-27 RX ADMIN — MAGNESIUM OXIDE 400 MG: 400 TABLET ORAL at 08:10

## 2021-10-27 RX ADMIN — ENOXAPARIN SODIUM 120 MG: 60 INJECTION SUBCUTANEOUS at 10:10

## 2021-10-27 RX ADMIN — MELATONIN 6 MG: at 10:10

## 2021-10-27 RX ADMIN — MAGNESIUM OXIDE 400 MG: 400 TABLET ORAL at 12:10

## 2021-10-27 RX ADMIN — Medication 5000 UNITS: at 12:10

## 2021-10-27 RX ADMIN — ENOXAPARIN SODIUM 120 MG: 60 INJECTION SUBCUTANEOUS at 12:10

## 2021-10-27 RX ADMIN — DILTIAZEM HYDROCHLORIDE 180 MG: 180 CAPSULE, COATED, EXTENDED RELEASE ORAL at 12:10

## 2021-10-28 LAB
ALBUMIN SERPL BCP-MCNC: 3.8 G/DL (ref 3.5–5.2)
ALP SERPL-CCNC: 73 U/L (ref 55–135)
ALT SERPL W/O P-5'-P-CCNC: 28 U/L (ref 10–44)
ANION GAP SERPL CALC-SCNC: 12 MMOL/L (ref 8–16)
AST SERPL-CCNC: 21 U/L (ref 10–40)
BASOPHILS # BLD AUTO: 0.08 K/UL (ref 0–0.2)
BASOPHILS NFR BLD: 1.1 % (ref 0–1.9)
BILIRUB SERPL-MCNC: 0.6 MG/DL (ref 0.1–1)
BSA FOR ECHO PROCEDURE: 2.26 M2
BUN SERPL-MCNC: 17 MG/DL (ref 8–23)
CALCIUM SERPL-MCNC: 8.9 MG/DL (ref 8.7–10.5)
CHLORIDE SERPL-SCNC: 98 MMOL/L (ref 95–110)
CO2 SERPL-SCNC: 30 MMOL/L (ref 23–29)
CREAT SERPL-MCNC: 0.9 MG/DL (ref 0.5–1.4)
DIFFERENTIAL METHOD: ABNORMAL
EJECTION FRACTION: 30 %
EOSINOPHIL # BLD AUTO: 0.2 K/UL (ref 0–0.5)
EOSINOPHIL NFR BLD: 2.4 % (ref 0–8)
ERYTHROCYTE [DISTWIDTH] IN BLOOD BY AUTOMATED COUNT: 13.5 % (ref 11.5–14.5)
EST. GFR  (AFRICAN AMERICAN): >60 ML/MIN/1.73 M^2
EST. GFR  (NON AFRICAN AMERICAN): >60 ML/MIN/1.73 M^2
GLUCOSE SERPL-MCNC: 105 MG/DL (ref 70–110)
HCT VFR BLD AUTO: 45.8 % (ref 37–48.5)
HGB BLD-MCNC: 13.9 G/DL (ref 12–16)
IMM GRANULOCYTES # BLD AUTO: 0.02 K/UL (ref 0–0.04)
IMM GRANULOCYTES NFR BLD AUTO: 0.3 % (ref 0–0.5)
LYMPHOCYTES # BLD AUTO: 2.2 K/UL (ref 1–4.8)
LYMPHOCYTES NFR BLD: 30.6 % (ref 18–48)
MAGNESIUM SERPL-MCNC: 2.1 MG/DL (ref 1.6–2.6)
MCH RBC QN AUTO: 30.4 PG (ref 27–31)
MCHC RBC AUTO-ENTMCNC: 30.3 G/DL (ref 32–36)
MCV RBC AUTO: 100 FL (ref 82–98)
MONOCYTES # BLD AUTO: 1 K/UL (ref 0.3–1)
MONOCYTES NFR BLD: 13.5 % (ref 4–15)
NEUTROPHILS # BLD AUTO: 3.8 K/UL (ref 1.8–7.7)
NEUTROPHILS NFR BLD: 52.1 % (ref 38–73)
NRBC BLD-RTO: 0 /100 WBC
PHOSPHATE SERPL-MCNC: 4.6 MG/DL (ref 2.7–4.5)
PLATELET # BLD AUTO: 241 K/UL (ref 150–450)
PMV BLD AUTO: 11.7 FL (ref 9.2–12.9)
POTASSIUM SERPL-SCNC: 4.7 MMOL/L (ref 3.5–5.1)
PROT SERPL-MCNC: 6.7 G/DL (ref 6–8.4)
RBC # BLD AUTO: 4.57 M/UL (ref 4–5.4)
SODIUM SERPL-SCNC: 140 MMOL/L (ref 136–145)
WBC # BLD AUTO: 7.19 K/UL (ref 3.9–12.7)

## 2021-10-28 PROCEDURE — 99233 SBSQ HOSP IP/OBS HIGH 50: CPT | Mod: ,,, | Performed by: INTERNAL MEDICINE

## 2021-10-28 PROCEDURE — 63600175 PHARM REV CODE 636 W HCPCS: Performed by: HOSPITALIST

## 2021-10-28 PROCEDURE — 94761 N-INVAS EAR/PLS OXIMETRY MLT: CPT

## 2021-10-28 PROCEDURE — 25000003 PHARM REV CODE 250: Performed by: HOSPITALIST

## 2021-10-28 PROCEDURE — 83735 ASSAY OF MAGNESIUM: CPT | Performed by: HOSPITALIST

## 2021-10-28 PROCEDURE — 25000003 PHARM REV CODE 250: Performed by: STUDENT IN AN ORGANIZED HEALTH CARE EDUCATION/TRAINING PROGRAM

## 2021-10-28 PROCEDURE — 21000000 HC CCU ICU ROOM CHARGE

## 2021-10-28 PROCEDURE — 99900035 HC TECH TIME PER 15 MIN (STAT)

## 2021-10-28 PROCEDURE — 80053 COMPREHEN METABOLIC PANEL: CPT | Performed by: HOSPITALIST

## 2021-10-28 PROCEDURE — 84100 ASSAY OF PHOSPHORUS: CPT | Performed by: HOSPITALIST

## 2021-10-28 PROCEDURE — 27000221 HC OXYGEN, UP TO 24 HOURS

## 2021-10-28 PROCEDURE — 36415 COLL VENOUS BLD VENIPUNCTURE: CPT | Performed by: HOSPITALIST

## 2021-10-28 PROCEDURE — 63600175 PHARM REV CODE 636 W HCPCS: Performed by: NURSE PRACTITIONER

## 2021-10-28 PROCEDURE — 25000003 PHARM REV CODE 250: Performed by: INTERNAL MEDICINE

## 2021-10-28 PROCEDURE — 85025 COMPLETE CBC W/AUTO DIFF WBC: CPT | Performed by: HOSPITALIST

## 2021-10-28 PROCEDURE — 99233 PR SUBSEQUENT HOSPITAL CARE,LEVL III: ICD-10-PCS | Mod: ,,, | Performed by: INTERNAL MEDICINE

## 2021-10-28 RX ORDER — CEPHALEXIN 250 MG/1
500 CAPSULE ORAL EVERY 6 HOURS
Status: DISCONTINUED | OUTPATIENT
Start: 2021-10-28 | End: 2021-10-30 | Stop reason: HOSPADM

## 2021-10-28 RX ORDER — DIGOXIN 125 MCG
0.12 TABLET ORAL DAILY
Status: DISCONTINUED | OUTPATIENT
Start: 2021-10-29 | End: 2021-10-30 | Stop reason: HOSPADM

## 2021-10-28 RX ORDER — DIGOXIN 0.25 MG/ML
250 INJECTION INTRAMUSCULAR; INTRAVENOUS ONCE
Status: COMPLETED | OUTPATIENT
Start: 2021-10-28 | End: 2021-10-28

## 2021-10-28 RX ORDER — DILTIAZEM HYDROCHLORIDE 240 MG/1
240 CAPSULE, EXTENDED RELEASE ORAL DAILY
Status: DISCONTINUED | OUTPATIENT
Start: 2021-10-29 | End: 2021-10-30 | Stop reason: HOSPADM

## 2021-10-28 RX ADMIN — POTASSIUM CHLORIDE 20 MEQ: 20 TABLET, EXTENDED RELEASE ORAL at 09:10

## 2021-10-28 RX ADMIN — CEPHALEXIN 500 MG: 250 CAPSULE ORAL at 12:10

## 2021-10-28 RX ADMIN — POLYETHYLENE GLYCOL 3350 17 G: 17 POWDER, FOR SOLUTION ORAL at 09:10

## 2021-10-28 RX ADMIN — ENOXAPARIN SODIUM 120 MG: 60 INJECTION SUBCUTANEOUS at 11:10

## 2021-10-28 RX ADMIN — FUROSEMIDE 40 MG: 10 INJECTION, SOLUTION INTRAMUSCULAR; INTRAVENOUS at 12:10

## 2021-10-28 RX ADMIN — CEPHALEXIN 500 MG: 250 CAPSULE ORAL at 07:10

## 2021-10-28 RX ADMIN — DIGOXIN 250 MCG: 0.25 INJECTION INTRAMUSCULAR; INTRAVENOUS at 10:10

## 2021-10-28 RX ADMIN — MAGNESIUM OXIDE 400 MG: 400 TABLET ORAL at 09:10

## 2021-10-28 RX ADMIN — DILTIAZEM HYDROCHLORIDE 180 MG: 180 CAPSULE, COATED, EXTENDED RELEASE ORAL at 09:10

## 2021-10-28 RX ADMIN — LOSARTAN POTASSIUM 50 MG: 50 TABLET, FILM COATED ORAL at 09:10

## 2021-10-28 RX ADMIN — METOPROLOL TARTRATE 25 MG: 25 TABLET, FILM COATED ORAL at 09:10

## 2021-10-28 RX ADMIN — MELATONIN 6 MG: at 09:10

## 2021-10-28 RX ADMIN — CEPHALEXIN 500 MG: 250 CAPSULE ORAL at 11:10

## 2021-10-28 RX ADMIN — Medication 5000 UNITS: at 09:10

## 2021-10-29 LAB
ALBUMIN SERPL BCP-MCNC: 3.7 G/DL (ref 3.5–5.2)
ALP SERPL-CCNC: 69 U/L (ref 55–135)
ALT SERPL W/O P-5'-P-CCNC: 26 U/L (ref 10–44)
ANION GAP SERPL CALC-SCNC: 7 MMOL/L (ref 8–16)
AST SERPL-CCNC: 17 U/L (ref 10–40)
BASOPHILS # BLD AUTO: 0.06 K/UL (ref 0–0.2)
BASOPHILS NFR BLD: 1 % (ref 0–1.9)
BILIRUB SERPL-MCNC: 0.8 MG/DL (ref 0.1–1)
BUN SERPL-MCNC: 20 MG/DL (ref 8–23)
CALCIUM SERPL-MCNC: 8.5 MG/DL (ref 8.7–10.5)
CHLORIDE SERPL-SCNC: 102 MMOL/L (ref 95–110)
CO2 SERPL-SCNC: 30 MMOL/L (ref 23–29)
CREAT SERPL-MCNC: 0.7 MG/DL (ref 0.5–1.4)
DIFFERENTIAL METHOD: ABNORMAL
EOSINOPHIL # BLD AUTO: 0.1 K/UL (ref 0–0.5)
EOSINOPHIL NFR BLD: 2.4 % (ref 0–8)
ERYTHROCYTE [DISTWIDTH] IN BLOOD BY AUTOMATED COUNT: 13.3 % (ref 11.5–14.5)
EST. GFR  (AFRICAN AMERICAN): >60 ML/MIN/1.73 M^2
EST. GFR  (NON AFRICAN AMERICAN): >60 ML/MIN/1.73 M^2
GLUCOSE SERPL-MCNC: 121 MG/DL (ref 70–110)
HCT VFR BLD AUTO: 46.6 % (ref 37–48.5)
HGB BLD-MCNC: 14.4 G/DL (ref 12–16)
IMM GRANULOCYTES # BLD AUTO: 0.01 K/UL (ref 0–0.04)
IMM GRANULOCYTES NFR BLD AUTO: 0.2 % (ref 0–0.5)
LYMPHOCYTES # BLD AUTO: 2 K/UL (ref 1–4.8)
LYMPHOCYTES NFR BLD: 34.5 % (ref 18–48)
MAGNESIUM SERPL-MCNC: 2.2 MG/DL (ref 1.6–2.6)
MCH RBC QN AUTO: 30.2 PG (ref 27–31)
MCHC RBC AUTO-ENTMCNC: 30.9 G/DL (ref 32–36)
MCV RBC AUTO: 98 FL (ref 82–98)
MONOCYTES # BLD AUTO: 0.8 K/UL (ref 0.3–1)
MONOCYTES NFR BLD: 13.5 % (ref 4–15)
NEUTROPHILS # BLD AUTO: 2.9 K/UL (ref 1.8–7.7)
NEUTROPHILS NFR BLD: 48.4 % (ref 38–73)
NRBC BLD-RTO: 0 /100 WBC
PHOSPHATE SERPL-MCNC: 4.4 MG/DL (ref 2.7–4.5)
PLATELET # BLD AUTO: 247 K/UL (ref 150–450)
PMV BLD AUTO: 11.3 FL (ref 9.2–12.9)
POTASSIUM SERPL-SCNC: 4.4 MMOL/L (ref 3.5–5.1)
PROT SERPL-MCNC: 6.5 G/DL (ref 6–8.4)
RBC # BLD AUTO: 4.77 M/UL (ref 4–5.4)
SODIUM SERPL-SCNC: 139 MMOL/L (ref 136–145)
WBC # BLD AUTO: 5.91 K/UL (ref 3.9–12.7)

## 2021-10-29 PROCEDURE — 99900035 HC TECH TIME PER 15 MIN (STAT)

## 2021-10-29 PROCEDURE — 63600175 PHARM REV CODE 636 W HCPCS: Performed by: HOSPITALIST

## 2021-10-29 PROCEDURE — 94761 N-INVAS EAR/PLS OXIMETRY MLT: CPT

## 2021-10-29 PROCEDURE — 85025 COMPLETE CBC W/AUTO DIFF WBC: CPT | Performed by: HOSPITALIST

## 2021-10-29 PROCEDURE — 99233 PR SUBSEQUENT HOSPITAL CARE,LEVL III: ICD-10-PCS | Mod: ,,, | Performed by: INTERNAL MEDICINE

## 2021-10-29 PROCEDURE — 25000003 PHARM REV CODE 250: Performed by: NURSE PRACTITIONER

## 2021-10-29 PROCEDURE — 97116 GAIT TRAINING THERAPY: CPT

## 2021-10-29 PROCEDURE — 83735 ASSAY OF MAGNESIUM: CPT | Performed by: HOSPITALIST

## 2021-10-29 PROCEDURE — 25000003 PHARM REV CODE 250: Performed by: INTERNAL MEDICINE

## 2021-10-29 PROCEDURE — 36415 COLL VENOUS BLD VENIPUNCTURE: CPT | Performed by: HOSPITALIST

## 2021-10-29 PROCEDURE — 63600175 PHARM REV CODE 636 W HCPCS: Performed by: NURSE PRACTITIONER

## 2021-10-29 PROCEDURE — 27000221 HC OXYGEN, UP TO 24 HOURS

## 2021-10-29 PROCEDURE — 25000003 PHARM REV CODE 250: Performed by: HOSPITALIST

## 2021-10-29 PROCEDURE — 80053 COMPREHEN METABOLIC PANEL: CPT | Performed by: HOSPITALIST

## 2021-10-29 PROCEDURE — 21000000 HC CCU ICU ROOM CHARGE

## 2021-10-29 PROCEDURE — 25000003 PHARM REV CODE 250: Performed by: STUDENT IN AN ORGANIZED HEALTH CARE EDUCATION/TRAINING PROGRAM

## 2021-10-29 PROCEDURE — 84100 ASSAY OF PHOSPHORUS: CPT | Performed by: HOSPITALIST

## 2021-10-29 PROCEDURE — 97165 OT EVAL LOW COMPLEX 30 MIN: CPT

## 2021-10-29 PROCEDURE — 99233 SBSQ HOSP IP/OBS HIGH 50: CPT | Mod: ,,, | Performed by: INTERNAL MEDICINE

## 2021-10-29 PROCEDURE — 25000242 PHARM REV CODE 250 ALT 637 W/ HCPCS: Performed by: NURSE PRACTITIONER

## 2021-10-29 PROCEDURE — 97161 PT EVAL LOW COMPLEX 20 MIN: CPT

## 2021-10-29 RX ORDER — METOPROLOL TARTRATE 25 MG/1
12.5 TABLET ORAL ONCE
Status: COMPLETED | OUTPATIENT
Start: 2021-10-29 | End: 2021-10-29

## 2021-10-29 RX ADMIN — DIGOXIN 0.12 MG: 125 TABLET ORAL at 08:10

## 2021-10-29 RX ADMIN — LOSARTAN POTASSIUM 50 MG: 50 TABLET, FILM COATED ORAL at 08:10

## 2021-10-29 RX ADMIN — Medication 5000 UNITS: at 08:10

## 2021-10-29 RX ADMIN — POTASSIUM CHLORIDE 20 MEQ: 20 TABLET, EXTENDED RELEASE ORAL at 08:10

## 2021-10-29 RX ADMIN — MAGNESIUM OXIDE 400 MG: 400 TABLET ORAL at 08:10

## 2021-10-29 RX ADMIN — APIXABAN 5 MG: 5 TABLET, FILM COATED ORAL at 08:10

## 2021-10-29 RX ADMIN — FUROSEMIDE 40 MG: 10 INJECTION, SOLUTION INTRAMUSCULAR; INTRAVENOUS at 02:10

## 2021-10-29 RX ADMIN — METOPROLOL TARTRATE 37.5 MG: 25 TABLET, FILM COATED ORAL at 08:10

## 2021-10-29 RX ADMIN — METOPROLOL TARTRATE 25 MG: 25 TABLET, FILM COATED ORAL at 08:10

## 2021-10-29 RX ADMIN — FUROSEMIDE 40 MG: 10 INJECTION, SOLUTION INTRAMUSCULAR; INTRAVENOUS at 06:10

## 2021-10-29 RX ADMIN — CEPHALEXIN 500 MG: 250 CAPSULE ORAL at 11:10

## 2021-10-29 RX ADMIN — METOPROLOL TARTRATE 12.5 MG: 25 TABLET, FILM COATED ORAL at 11:10

## 2021-10-29 RX ADMIN — DILTIAZEM HYDROCHLORIDE 240 MG: 240 CAPSULE, EXTENDED RELEASE ORAL at 08:10

## 2021-10-29 RX ADMIN — POLYETHYLENE GLYCOL 3350 17 G: 17 POWDER, FOR SOLUTION ORAL at 08:10

## 2021-10-29 RX ADMIN — ENOXAPARIN SODIUM 120 MG: 60 INJECTION SUBCUTANEOUS at 11:10

## 2021-10-29 RX ADMIN — CEPHALEXIN 500 MG: 250 CAPSULE ORAL at 06:10

## 2021-10-29 RX ADMIN — CEPHALEXIN 500 MG: 250 CAPSULE ORAL at 05:10

## 2021-10-30 VITALS
SYSTOLIC BLOOD PRESSURE: 102 MMHG | HEART RATE: 154 BPM | HEIGHT: 62 IN | BODY MASS INDEX: 46.68 KG/M2 | OXYGEN SATURATION: 95 % | WEIGHT: 253.69 LBS | RESPIRATION RATE: 15 BRPM | DIASTOLIC BLOOD PRESSURE: 62 MMHG | TEMPERATURE: 98 F

## 2021-10-30 PROBLEM — I35.0 MODERATE AORTIC STENOSIS: Status: ACTIVE | Noted: 2021-10-30

## 2021-10-30 PROBLEM — I50.9 CHF (CONGESTIVE HEART FAILURE): Status: ACTIVE | Noted: 2021-10-30

## 2021-10-30 PROBLEM — R06.09 DOE (DYSPNEA ON EXERTION): Status: RESOLVED | Noted: 2021-02-11 | Resolved: 2021-10-30

## 2021-10-30 PROCEDURE — 99900031 HC PATIENT EDUCATION (STAT)

## 2021-10-30 PROCEDURE — 25000003 PHARM REV CODE 250: Performed by: STUDENT IN AN ORGANIZED HEALTH CARE EDUCATION/TRAINING PROGRAM

## 2021-10-30 PROCEDURE — 25000242 PHARM REV CODE 250 ALT 637 W/ HCPCS: Performed by: NURSE PRACTITIONER

## 2021-10-30 PROCEDURE — 25000003 PHARM REV CODE 250: Performed by: INTERNAL MEDICINE

## 2021-10-30 PROCEDURE — 63600175 PHARM REV CODE 636 W HCPCS: Performed by: NURSE PRACTITIONER

## 2021-10-30 PROCEDURE — 25000003 PHARM REV CODE 250: Performed by: NURSE PRACTITIONER

## 2021-10-30 PROCEDURE — 94761 N-INVAS EAR/PLS OXIMETRY MLT: CPT

## 2021-10-30 PROCEDURE — 97116 GAIT TRAINING THERAPY: CPT

## 2021-10-30 PROCEDURE — 25000003 PHARM REV CODE 250: Performed by: HOSPITALIST

## 2021-10-30 RX ORDER — DIGOXIN 125 MCG
0.12 TABLET ORAL DAILY
Qty: 90 TABLET | Refills: 0 | Status: SHIPPED | OUTPATIENT
Start: 2021-10-31 | End: 2022-03-07 | Stop reason: SDUPTHER

## 2021-10-30 RX ORDER — METOPROLOL TARTRATE 37.5 MG/1
37.5 TABLET, FILM COATED ORAL 2 TIMES DAILY
Qty: 180 TABLET | Refills: 0 | Status: CANCELLED | OUTPATIENT
Start: 2021-10-30 | End: 2022-10-30

## 2021-10-30 RX ORDER — DILTIAZEM HYDROCHLORIDE 240 MG/1
240 CAPSULE, EXTENDED RELEASE ORAL DAILY
Qty: 90 CAPSULE | Refills: 0 | OUTPATIENT
Start: 2021-10-31 | End: 2022-01-29

## 2021-10-30 RX ORDER — LOSARTAN POTASSIUM 50 MG/1
50 TABLET ORAL DAILY
Qty: 90 TABLET | Refills: 0 | Status: SHIPPED | OUTPATIENT
Start: 2021-10-31 | End: 2022-03-07 | Stop reason: SDUPTHER

## 2021-10-30 RX ORDER — METOPROLOL TARTRATE 37.5 MG/1
37.5 TABLET, FILM COATED ORAL 2 TIMES DAILY
Qty: 180 TABLET | Refills: 0 | Status: SHIPPED | OUTPATIENT
Start: 2021-10-30 | End: 2022-03-07 | Stop reason: SDUPTHER

## 2021-10-30 RX ORDER — LOSARTAN POTASSIUM 50 MG/1
50 TABLET ORAL DAILY
Qty: 90 TABLET | Refills: 0 | Status: CANCELLED | OUTPATIENT
Start: 2021-10-31 | End: 2022-01-29

## 2021-10-30 RX ORDER — DIGOXIN 125 MCG
0.12 TABLET ORAL DAILY
Qty: 90 TABLET | Refills: 0 | Status: CANCELLED | OUTPATIENT
Start: 2021-10-31 | End: 2022-01-29

## 2021-10-30 RX ORDER — LANOLIN ALCOHOL/MO/W.PET/CERES
400 CREAM (GRAM) TOPICAL 2 TIMES DAILY
Qty: 90 TABLET | Refills: 0 | Status: CANCELLED | OUTPATIENT
Start: 2021-10-30

## 2021-10-30 RX ORDER — DILTIAZEM HYDROCHLORIDE 240 MG/1
240 CAPSULE, EXTENDED RELEASE ORAL DAILY
Qty: 90 CAPSULE | Refills: 0 | Status: SHIPPED | OUTPATIENT
Start: 2021-10-31 | End: 2021-11-08 | Stop reason: DRUGHIGH

## 2021-10-30 RX ORDER — FUROSEMIDE 40 MG/1
40 TABLET ORAL 2 TIMES DAILY
Qty: 180 TABLET | Refills: 0 | Status: CANCELLED | OUTPATIENT
Start: 2021-10-30

## 2021-10-30 RX ORDER — LANOLIN ALCOHOL/MO/W.PET/CERES
400 CREAM (GRAM) TOPICAL 2 TIMES DAILY
Qty: 90 TABLET | Refills: 0 | Status: SHIPPED | OUTPATIENT
Start: 2021-10-30 | End: 2022-03-07 | Stop reason: SDUPTHER

## 2021-10-30 RX ORDER — FUROSEMIDE 40 MG/1
40 TABLET ORAL 2 TIMES DAILY
Qty: 180 TABLET | Refills: 0 | Status: SHIPPED | OUTPATIENT
Start: 2021-10-30 | End: 2022-03-07 | Stop reason: SDUPTHER

## 2021-10-30 RX ADMIN — DILTIAZEM HYDROCHLORIDE 240 MG: 240 CAPSULE, EXTENDED RELEASE ORAL at 08:10

## 2021-10-30 RX ADMIN — APIXABAN 5 MG: 5 TABLET, FILM COATED ORAL at 08:10

## 2021-10-30 RX ADMIN — POLYETHYLENE GLYCOL 3350 17 G: 17 POWDER, FOR SOLUTION ORAL at 08:10

## 2021-10-30 RX ADMIN — MAGNESIUM OXIDE 400 MG: 400 TABLET ORAL at 08:10

## 2021-10-30 RX ADMIN — DIGOXIN 0.12 MG: 125 TABLET ORAL at 08:10

## 2021-10-30 RX ADMIN — POTASSIUM CHLORIDE 20 MEQ: 20 TABLET, EXTENDED RELEASE ORAL at 08:10

## 2021-10-30 RX ADMIN — FUROSEMIDE 40 MG: 10 INJECTION, SOLUTION INTRAMUSCULAR; INTRAVENOUS at 05:10

## 2021-10-30 RX ADMIN — METOPROLOL TARTRATE 37.5 MG: 25 TABLET, FILM COATED ORAL at 08:10

## 2021-10-30 RX ADMIN — CEPHALEXIN 500 MG: 250 CAPSULE ORAL at 05:10

## 2021-10-30 RX ADMIN — Medication 5000 UNITS: at 08:10

## 2021-10-30 RX ADMIN — LOSARTAN POTASSIUM 50 MG: 50 TABLET, FILM COATED ORAL at 08:10

## 2021-11-04 ENCOUNTER — TELEPHONE (OUTPATIENT)
Dept: CARDIOLOGY | Facility: CLINIC | Age: 66
End: 2021-11-04
Payer: MEDICARE

## 2021-11-08 ENCOUNTER — OFFICE VISIT (OUTPATIENT)
Dept: FAMILY MEDICINE | Facility: CLINIC | Age: 66
End: 2021-11-08
Payer: MEDICARE

## 2021-11-08 VITALS
OXYGEN SATURATION: 95 % | SYSTOLIC BLOOD PRESSURE: 132 MMHG | DIASTOLIC BLOOD PRESSURE: 84 MMHG | BODY MASS INDEX: 44.1 KG/M2 | RESPIRATION RATE: 16 BRPM | TEMPERATURE: 98 F | WEIGHT: 241.19 LBS | HEART RATE: 81 BPM

## 2021-11-08 DIAGNOSIS — G47.33 OBSTRUCTIVE SLEEP APNEA SYNDROME: ICD-10-CM

## 2021-11-08 DIAGNOSIS — I48.91 ATRIAL FIBRILLATION WITH RVR: ICD-10-CM

## 2021-11-08 DIAGNOSIS — I35.0 MODERATE AORTIC STENOSIS: ICD-10-CM

## 2021-11-08 DIAGNOSIS — Z23 IMMUNIZATION DUE: ICD-10-CM

## 2021-11-08 DIAGNOSIS — E66.01 CLASS 3 SEVERE OBESITY WITH BODY MASS INDEX (BMI) OF 40.0 TO 44.9 IN ADULT, UNSPECIFIED OBESITY TYPE, UNSPECIFIED WHETHER SERIOUS COMORBIDITY PRESENT: ICD-10-CM

## 2021-11-08 DIAGNOSIS — I50.9 CONGESTIVE HEART FAILURE, UNSPECIFIED HF CHRONICITY, UNSPECIFIED HEART FAILURE TYPE: Primary | ICD-10-CM

## 2021-11-08 PROCEDURE — 1111F PR DISCHARGE MEDS RECONCILED W/ CURRENT OUTPATIENT MED LIST: ICD-10-PCS | Mod: S$GLB,,, | Performed by: FAMILY MEDICINE

## 2021-11-08 PROCEDURE — 93000 POCT EKG 12-LEAD: ICD-10-PCS | Mod: S$GLB,,, | Performed by: FAMILY MEDICINE

## 2021-11-08 PROCEDURE — 93000 ELECTROCARDIOGRAM COMPLETE: CPT | Mod: S$GLB,,, | Performed by: FAMILY MEDICINE

## 2021-11-08 PROCEDURE — 3075F PR MOST RECENT SYSTOLIC BLOOD PRESS GE 130-139MM HG: ICD-10-PCS | Mod: S$GLB,,, | Performed by: FAMILY MEDICINE

## 2021-11-08 PROCEDURE — G0008 ADMIN INFLUENZA VIRUS VAC: HCPCS | Mod: S$GLB,,, | Performed by: FAMILY MEDICINE

## 2021-11-08 PROCEDURE — 4010F ACE/ARB THERAPY RXD/TAKEN: CPT | Mod: S$GLB,,, | Performed by: FAMILY MEDICINE

## 2021-11-08 PROCEDURE — 1111F DSCHRG MED/CURRENT MED MERGE: CPT | Mod: S$GLB,,, | Performed by: FAMILY MEDICINE

## 2021-11-08 PROCEDURE — 99214 OFFICE O/P EST MOD 30 MIN: CPT | Mod: 25,S$GLB,, | Performed by: FAMILY MEDICINE

## 2021-11-08 PROCEDURE — 3008F PR BODY MASS INDEX (BMI) DOCUMENTED: ICD-10-PCS | Mod: S$GLB,,, | Performed by: FAMILY MEDICINE

## 2021-11-08 PROCEDURE — 4010F PR ACE/ARB THEARPY RXD/TAKEN: ICD-10-PCS | Mod: S$GLB,,, | Performed by: FAMILY MEDICINE

## 2021-11-08 PROCEDURE — 90686 IIV4 VACC NO PRSV 0.5 ML IM: CPT | Mod: S$GLB,,, | Performed by: FAMILY MEDICINE

## 2021-11-08 PROCEDURE — G0008 FLU VACCINE (QUAD) GREATER THAN OR EQUAL TO 3YO PRESERVATIVE FREE IM: ICD-10-PCS | Mod: S$GLB,,, | Performed by: FAMILY MEDICINE

## 2021-11-08 PROCEDURE — 3008F BODY MASS INDEX DOCD: CPT | Mod: S$GLB,,, | Performed by: FAMILY MEDICINE

## 2021-11-08 PROCEDURE — 99214 PR OFFICE/OUTPT VISIT, EST, LEVL IV, 30-39 MIN: ICD-10-PCS | Mod: 25,S$GLB,, | Performed by: FAMILY MEDICINE

## 2021-11-08 PROCEDURE — 3079F PR MOST RECENT DIASTOLIC BLOOD PRESSURE 80-89 MM HG: ICD-10-PCS | Mod: S$GLB,,, | Performed by: FAMILY MEDICINE

## 2021-11-08 PROCEDURE — 3075F SYST BP GE 130 - 139MM HG: CPT | Mod: S$GLB,,, | Performed by: FAMILY MEDICINE

## 2021-11-08 PROCEDURE — 90686 FLU VACCINE (QUAD) GREATER THAN OR EQUAL TO 3YO PRESERVATIVE FREE IM: ICD-10-PCS | Mod: S$GLB,,, | Performed by: FAMILY MEDICINE

## 2021-11-08 PROCEDURE — 3079F DIAST BP 80-89 MM HG: CPT | Mod: S$GLB,,, | Performed by: FAMILY MEDICINE

## 2021-11-08 RX ORDER — DILTIAZEM HYDROCHLORIDE 360 MG/1
360 CAPSULE, EXTENDED RELEASE ORAL DAILY
Qty: 30 CAPSULE | Refills: 11 | Status: SHIPPED | OUTPATIENT
Start: 2021-11-08 | End: 2023-01-03 | Stop reason: SDUPTHER

## 2021-11-11 ENCOUNTER — TELEPHONE (OUTPATIENT)
Dept: CARDIOLOGY | Facility: CLINIC | Age: 66
End: 2021-11-11
Payer: MEDICARE

## 2021-12-09 ENCOUNTER — TELEPHONE (OUTPATIENT)
Dept: CARDIOLOGY | Facility: CLINIC | Age: 66
End: 2021-12-09
Payer: MEDICARE

## 2021-12-23 ENCOUNTER — OFFICE VISIT (OUTPATIENT)
Dept: CARDIOLOGY | Facility: CLINIC | Age: 66
End: 2021-12-23
Payer: MEDICARE

## 2021-12-23 ENCOUNTER — LAB VISIT (OUTPATIENT)
Dept: LAB | Facility: HOSPITAL | Age: 66
End: 2021-12-23
Attending: NURSE PRACTITIONER
Payer: MEDICARE

## 2021-12-23 VITALS
WEIGHT: 245 LBS | HEIGHT: 62 IN | SYSTOLIC BLOOD PRESSURE: 128 MMHG | BODY MASS INDEX: 45.08 KG/M2 | DIASTOLIC BLOOD PRESSURE: 80 MMHG | HEART RATE: 68 BPM

## 2021-12-23 DIAGNOSIS — I50.9 CONGESTIVE HEART FAILURE, UNSPECIFIED HF CHRONICITY, UNSPECIFIED HEART FAILURE TYPE: ICD-10-CM

## 2021-12-23 DIAGNOSIS — I48.91 ATRIAL FIBRILLATION WITH RVR: ICD-10-CM

## 2021-12-23 DIAGNOSIS — I48.91 ATRIAL FIBRILLATION, UNSPECIFIED TYPE: Primary | ICD-10-CM

## 2021-12-23 LAB
ALBUMIN SERPL BCP-MCNC: 4.1 G/DL (ref 3.5–5.2)
ALP SERPL-CCNC: 78 U/L (ref 55–135)
ALT SERPL W/O P-5'-P-CCNC: 24 U/L (ref 10–44)
ANION GAP SERPL CALC-SCNC: 8 MMOL/L (ref 8–16)
AST SERPL-CCNC: 19 U/L (ref 10–40)
BASOPHILS # BLD AUTO: 0.06 K/UL (ref 0–0.2)
BASOPHILS NFR BLD: 0.6 % (ref 0–1.9)
BILIRUB SERPL-MCNC: 0.2 MG/DL (ref 0.1–1)
BNP SERPL-MCNC: 176 PG/ML (ref 0–99)
BUN SERPL-MCNC: 10 MG/DL (ref 8–23)
BUN SERPL-MCNC: ABNORMAL MG/DL (ref 8–23)
CALCIUM SERPL-MCNC: 9.1 MG/DL (ref 8.7–10.5)
CHLORIDE SERPL-SCNC: 102 MMOL/L (ref 95–110)
CO2 SERPL-SCNC: 32 MMOL/L (ref 23–29)
CREAT SERPL-MCNC: 0.9 MG/DL (ref 0.5–1.4)
CREAT SERPL-MCNC: ABNORMAL MG/DL (ref 0.5–1.4)
DIFFERENTIAL METHOD: ABNORMAL
EOSINOPHIL # BLD AUTO: 0.1 K/UL (ref 0–0.5)
EOSINOPHIL NFR BLD: 1 % (ref 0–8)
ERYTHROCYTE [DISTWIDTH] IN BLOOD BY AUTOMATED COUNT: 13.3 % (ref 11.5–14.5)
EST. GFR  (AFRICAN AMERICAN): >60 ML/MIN/1.73 M^2
EST. GFR  (AFRICAN AMERICAN): ABNORMAL ML/MIN/1.73 M^2
EST. GFR  (NON AFRICAN AMERICAN): >60 ML/MIN/1.73 M^2
EST. GFR  (NON AFRICAN AMERICAN): ABNORMAL ML/MIN/1.73 M^2
GLUCOSE SERPL-MCNC: 149 MG/DL (ref 70–110)
HCT VFR BLD AUTO: 50.5 % (ref 37–48.5)
HGB BLD-MCNC: 15.9 G/DL (ref 12–16)
IMM GRANULOCYTES # BLD AUTO: 0.04 K/UL (ref 0–0.04)
IMM GRANULOCYTES NFR BLD AUTO: 0.4 % (ref 0–0.5)
LYMPHOCYTES # BLD AUTO: 2.5 K/UL (ref 1–4.8)
LYMPHOCYTES NFR BLD: 25.8 % (ref 18–48)
MCH RBC QN AUTO: 29.9 PG (ref 27–31)
MCHC RBC AUTO-ENTMCNC: 31.5 G/DL (ref 32–36)
MCV RBC AUTO: 95 FL (ref 82–98)
MONOCYTES # BLD AUTO: 0.8 K/UL (ref 0.3–1)
MONOCYTES NFR BLD: 8 % (ref 4–15)
NEUTROPHILS # BLD AUTO: 6.3 K/UL (ref 1.8–7.7)
NEUTROPHILS NFR BLD: 64.2 % (ref 38–73)
NRBC BLD-RTO: 0 /100 WBC
PLATELET # BLD AUTO: 287 K/UL (ref 150–450)
PMV BLD AUTO: 10.5 FL (ref 9.2–12.9)
POTASSIUM SERPL-SCNC: 5.5 MMOL/L (ref 3.5–5.1)
PROT SERPL-MCNC: 7.1 G/DL (ref 6–8.4)
RBC # BLD AUTO: 5.32 M/UL (ref 4–5.4)
SODIUM SERPL-SCNC: 142 MMOL/L (ref 136–145)
WBC # BLD AUTO: 9.75 K/UL (ref 3.9–12.7)

## 2021-12-23 PROCEDURE — 1160F PR REVIEW ALL MEDS BY PRESCRIBER/CLIN PHARMACIST DOCUMENTED: ICD-10-PCS | Mod: CPTII,S$GLB,, | Performed by: NURSE PRACTITIONER

## 2021-12-23 PROCEDURE — 3074F PR MOST RECENT SYSTOLIC BLOOD PRESSURE < 130 MM HG: ICD-10-PCS | Mod: CPTII,S$GLB,, | Performed by: NURSE PRACTITIONER

## 2021-12-23 PROCEDURE — 93000 EKG 12-LEAD: ICD-10-PCS | Mod: S$GLB,,, | Performed by: SPECIALIST

## 2021-12-23 PROCEDURE — 1159F MED LIST DOCD IN RCRD: CPT | Mod: CPTII,S$GLB,, | Performed by: NURSE PRACTITIONER

## 2021-12-23 PROCEDURE — 80053 COMPREHEN METABOLIC PANEL: CPT | Performed by: NURSE PRACTITIONER

## 2021-12-23 PROCEDURE — 3079F PR MOST RECENT DIASTOLIC BLOOD PRESSURE 80-89 MM HG: ICD-10-PCS | Mod: CPTII,S$GLB,, | Performed by: NURSE PRACTITIONER

## 2021-12-23 PROCEDURE — 1160F RVW MEDS BY RX/DR IN RCRD: CPT | Mod: CPTII,S$GLB,, | Performed by: NURSE PRACTITIONER

## 2021-12-23 PROCEDURE — 83880 ASSAY OF NATRIURETIC PEPTIDE: CPT | Performed by: NURSE PRACTITIONER

## 2021-12-23 PROCEDURE — 1159F PR MEDICATION LIST DOCUMENTED IN MEDICAL RECORD: ICD-10-PCS | Mod: CPTII,S$GLB,, | Performed by: NURSE PRACTITIONER

## 2021-12-23 PROCEDURE — 36415 COLL VENOUS BLD VENIPUNCTURE: CPT | Performed by: NURSE PRACTITIONER

## 2021-12-23 PROCEDURE — 3079F DIAST BP 80-89 MM HG: CPT | Mod: CPTII,S$GLB,, | Performed by: NURSE PRACTITIONER

## 2021-12-23 PROCEDURE — 4010F PR ACE/ARB THEARPY RXD/TAKEN: ICD-10-PCS | Mod: CPTII,S$GLB,, | Performed by: NURSE PRACTITIONER

## 2021-12-23 PROCEDURE — 3008F PR BODY MASS INDEX (BMI) DOCUMENTED: ICD-10-PCS | Mod: CPTII,S$GLB,, | Performed by: NURSE PRACTITIONER

## 2021-12-23 PROCEDURE — 4010F ACE/ARB THERAPY RXD/TAKEN: CPT | Mod: CPTII,S$GLB,, | Performed by: NURSE PRACTITIONER

## 2021-12-23 PROCEDURE — 93000 ELECTROCARDIOGRAM COMPLETE: CPT | Mod: S$GLB,,, | Performed by: SPECIALIST

## 2021-12-23 PROCEDURE — 99214 OFFICE O/P EST MOD 30 MIN: CPT | Mod: S$GLB,,, | Performed by: NURSE PRACTITIONER

## 2021-12-23 PROCEDURE — 3074F SYST BP LT 130 MM HG: CPT | Mod: CPTII,S$GLB,, | Performed by: NURSE PRACTITIONER

## 2021-12-23 PROCEDURE — 3008F BODY MASS INDEX DOCD: CPT | Mod: CPTII,S$GLB,, | Performed by: NURSE PRACTITIONER

## 2021-12-23 PROCEDURE — 99214 PR OFFICE/OUTPT VISIT, EST, LEVL IV, 30-39 MIN: ICD-10-PCS | Mod: S$GLB,,, | Performed by: NURSE PRACTITIONER

## 2021-12-23 PROCEDURE — 85025 COMPLETE CBC W/AUTO DIFF WBC: CPT | Performed by: NURSE PRACTITIONER

## 2022-02-25 ENCOUNTER — TELEPHONE (OUTPATIENT)
Dept: FAMILY MEDICINE | Facility: CLINIC | Age: 67
End: 2022-02-25
Payer: MEDICARE

## 2022-02-25 NOTE — TELEPHONE ENCOUNTER
Returned the pt call concerning a medication refill request.    Pt didn't realize that her cardiologist is the one that is refilling the meds she is needing.  Pt will call that office to get the refills processed.

## 2022-03-07 DIAGNOSIS — I48.91 ATRIAL FIBRILLATION WITH RVR: ICD-10-CM

## 2022-03-07 DIAGNOSIS — I50.9 CONGESTIVE HEART FAILURE, UNSPECIFIED HF CHRONICITY, UNSPECIFIED HEART FAILURE TYPE: ICD-10-CM

## 2022-03-07 RX ORDER — FUROSEMIDE 40 MG/1
40 TABLET ORAL 2 TIMES DAILY
Qty: 180 TABLET | Refills: 3 | Status: SHIPPED | OUTPATIENT
Start: 2022-03-07 | End: 2022-03-08 | Stop reason: SDUPTHER

## 2022-03-07 RX ORDER — LOSARTAN POTASSIUM 50 MG/1
50 TABLET ORAL DAILY
Qty: 90 TABLET | Refills: 3 | Status: SHIPPED | OUTPATIENT
Start: 2022-03-07 | End: 2022-03-08 | Stop reason: SDUPTHER

## 2022-03-07 RX ORDER — METOPROLOL TARTRATE 37.5 MG/1
37.5 TABLET, FILM COATED ORAL 2 TIMES DAILY
Qty: 180 TABLET | Refills: 3 | Status: SHIPPED | OUTPATIENT
Start: 2022-03-07 | End: 2023-03-07 | Stop reason: SDUPTHER

## 2022-03-07 RX ORDER — DILTIAZEM HYDROCHLORIDE 360 MG/1
360 CAPSULE, EXTENDED RELEASE ORAL DAILY
Qty: 90 CAPSULE | Refills: 3 | Status: CANCELLED | OUTPATIENT
Start: 2022-03-07 | End: 2023-03-07

## 2022-03-07 RX ORDER — LANOLIN ALCOHOL/MO/W.PET/CERES
400 CREAM (GRAM) TOPICAL 2 TIMES DAILY
Qty: 90 TABLET | Refills: 3 | Status: SHIPPED | OUTPATIENT
Start: 2022-03-07 | End: 2022-09-12 | Stop reason: SDUPTHER

## 2022-03-07 RX ORDER — DIGOXIN 125 MCG
0.12 TABLET ORAL DAILY
Qty: 90 TABLET | Refills: 3 | Status: SHIPPED | OUTPATIENT
Start: 2022-03-07 | End: 2022-03-08 | Stop reason: SDUPTHER

## 2022-03-07 NOTE — TELEPHONE ENCOUNTER
----- Message from Loreta Dennis sent at 3/7/2022 12:42 PM CST -----  Regarding: advice  Contact: pt  Type: Needs Medical Advice  Who Called:  pt  Symptoms (please be specific):  n/a  How long has patient had these symptoms:  n/a    Pharmacy name and phone #:    [facility] [place] [address] - [address]  [address] [place] [address]  Phone: [phone] Fax: [phone]    Best Call Back Number: [phone]    Additional Information: asking for a call regarding meds pharmacy requested a week ago

## 2022-03-08 RX ORDER — FUROSEMIDE 40 MG/1
40 TABLET ORAL 2 TIMES DAILY
Qty: 180 TABLET | Refills: 3 | Status: SHIPPED | OUTPATIENT
Start: 2022-03-08 | End: 2023-03-07 | Stop reason: SDUPTHER

## 2022-03-08 RX ORDER — DIGOXIN 125 MCG
0.12 TABLET ORAL DAILY
Qty: 90 TABLET | Refills: 3 | Status: SHIPPED | OUTPATIENT
Start: 2022-03-08 | End: 2023-03-07 | Stop reason: SDUPTHER

## 2022-03-08 RX ORDER — LOSARTAN POTASSIUM 50 MG/1
50 TABLET ORAL DAILY
Qty: 90 TABLET | Refills: 3 | Status: SHIPPED | OUTPATIENT
Start: 2022-03-08 | End: 2023-03-07 | Stop reason: SDUPTHER

## 2022-09-13 RX ORDER — LANOLIN ALCOHOL/MO/W.PET/CERES
400 CREAM (GRAM) TOPICAL 2 TIMES DAILY
Qty: 90 TABLET | Refills: 3 | Status: SHIPPED | OUTPATIENT
Start: 2022-09-13 | End: 2023-03-07 | Stop reason: SDUPTHER

## 2022-11-21 DIAGNOSIS — I50.9 CONGESTIVE HEART FAILURE, UNSPECIFIED HF CHRONICITY, UNSPECIFIED HEART FAILURE TYPE: ICD-10-CM

## 2022-11-21 DIAGNOSIS — I48.91 ATRIAL FIBRILLATION WITH RVR: ICD-10-CM

## 2022-11-21 NOTE — TELEPHONE ENCOUNTER
Called patient to inform her to set up aopt. Since she has not seen Provider in over a year.  Phone's voice mail had not been set up.  Will send her a MY Chart message.

## 2022-11-23 RX ORDER — DILTIAZEM HYDROCHLORIDE 360 MG/1
CAPSULE, EXTENDED RELEASE ORAL
Qty: 30 CAPSULE | Refills: 11 | OUTPATIENT
Start: 2022-11-23

## 2023-01-03 DIAGNOSIS — I48.91 ATRIAL FIBRILLATION WITH RVR: ICD-10-CM

## 2023-01-03 DIAGNOSIS — I50.9 CONGESTIVE HEART FAILURE, UNSPECIFIED HF CHRONICITY, UNSPECIFIED HEART FAILURE TYPE: ICD-10-CM

## 2023-01-03 RX ORDER — DILTIAZEM HYDROCHLORIDE 360 MG/1
360 CAPSULE, EXTENDED RELEASE ORAL DAILY
Qty: 90 CAPSULE | Refills: 3 | Status: SHIPPED | OUTPATIENT
Start: 2023-01-03 | End: 2024-01-31

## 2023-02-12 ENCOUNTER — HOSPITAL ENCOUNTER (EMERGENCY)
Facility: HOSPITAL | Age: 68
Discharge: HOME | End: 2023-02-12
Attending: EMERGENCY MEDICINE
Payer: MEDICARE

## 2023-02-12 VITALS
RESPIRATION RATE: 18 BRPM | HEIGHT: 62 IN | OXYGEN SATURATION: 96 % | DIASTOLIC BLOOD PRESSURE: 69 MMHG | SYSTOLIC BLOOD PRESSURE: 116 MMHG | TEMPERATURE: 98 F | BODY MASS INDEX: 40.67 KG/M2 | HEART RATE: 76 BPM | WEIGHT: 221 LBS

## 2023-02-12 DIAGNOSIS — N30.00 ACUTE CYSTITIS WITHOUT HEMATURIA: ICD-10-CM

## 2023-02-12 DIAGNOSIS — R53.83 FATIGUE: ICD-10-CM

## 2023-02-12 DIAGNOSIS — E11.9 TYPE 2 DIABETES MELLITUS WITHOUT COMPLICATION, UNSPECIFIED WHETHER LONG TERM INSULIN USE: Primary | ICD-10-CM

## 2023-02-12 LAB
ALBUMIN SERPL BCP-MCNC: 3.6 G/DL (ref 3.5–5.2)
ALP SERPL-CCNC: 97 U/L (ref 55–135)
ALT SERPL W/O P-5'-P-CCNC: 22 U/L (ref 10–44)
ANION GAP SERPL CALC-SCNC: 9 MMOL/L (ref 8–16)
AST SERPL-CCNC: 20 U/L (ref 10–40)
B-OH-BUTYR BLD STRIP-SCNC: 0.2 MMOL/L (ref 0–0.5)
BACTERIA #/AREA URNS HPF: NEGATIVE /HPF
BASOPHILS # BLD AUTO: 0.06 K/UL (ref 0–0.2)
BASOPHILS NFR BLD: 0.6 % (ref 0–1.9)
BILIRUB SERPL-MCNC: 0.7 MG/DL (ref 0.1–1)
BILIRUB UR QL STRIP: NEGATIVE
BUN SERPL-MCNC: 13 MG/DL (ref 8–23)
CALCIUM SERPL-MCNC: 9.1 MG/DL (ref 8.7–10.5)
CHLORIDE SERPL-SCNC: 88 MMOL/L (ref 95–110)
CLARITY UR: CLEAR
CO2 SERPL-SCNC: 30 MMOL/L (ref 23–29)
COLOR UR: YELLOW
CREAT SERPL-MCNC: 0.9 MG/DL (ref 0.5–1.4)
DIFFERENTIAL METHOD: ABNORMAL
EOSINOPHIL # BLD AUTO: 0.1 K/UL (ref 0–0.5)
EOSINOPHIL NFR BLD: 1 % (ref 0–8)
ERYTHROCYTE [DISTWIDTH] IN BLOOD BY AUTOMATED COUNT: 12.5 % (ref 11.5–14.5)
EST. GFR  (NO RACE VARIABLE): >60 ML/MIN/1.73 M^2
GLUCOSE SERPL-MCNC: 271 MG/DL (ref 70–110)
GLUCOSE SERPL-MCNC: 509 MG/DL (ref 70–110)
GLUCOSE SERPL-MCNC: 574 MG/DL (ref 70–110)
GLUCOSE UR QL STRIP: ABNORMAL
HCT VFR BLD AUTO: 51.5 % (ref 37–48.5)
HGB BLD-MCNC: 16.7 G/DL (ref 12–16)
HGB UR QL STRIP: NEGATIVE
HYALINE CASTS #/AREA URNS LPF: 2 /LPF
IMM GRANULOCYTES # BLD AUTO: 0.04 K/UL (ref 0–0.04)
IMM GRANULOCYTES NFR BLD AUTO: 0.4 % (ref 0–0.5)
INFLUENZA A, MOLECULAR: NEGATIVE
INFLUENZA B, MOLECULAR: NEGATIVE
KETONES UR QL STRIP: NEGATIVE
LEUKOCYTE ESTERASE UR QL STRIP: NEGATIVE
LYMPHOCYTES # BLD AUTO: 2.1 K/UL (ref 1–4.8)
LYMPHOCYTES NFR BLD: 21.3 % (ref 18–48)
MAGNESIUM SERPL-MCNC: 1.8 MG/DL (ref 1.6–2.6)
MCH RBC QN AUTO: 30.2 PG (ref 27–31)
MCHC RBC AUTO-ENTMCNC: 32.4 G/DL (ref 32–36)
MCV RBC AUTO: 93 FL (ref 82–98)
MICROSCOPIC COMMENT: ABNORMAL
MONOCYTES # BLD AUTO: 0.7 K/UL (ref 0.3–1)
MONOCYTES NFR BLD: 7.3 % (ref 4–15)
NEUTROPHILS # BLD AUTO: 6.8 K/UL (ref 1.8–7.7)
NEUTROPHILS NFR BLD: 69.4 % (ref 38–73)
NITRITE UR QL STRIP: NEGATIVE
NRBC BLD-RTO: 0 /100 WBC
PH UR STRIP: 6 [PH] (ref 5–8)
PLATELET # BLD AUTO: 273 K/UL (ref 150–450)
PMV BLD AUTO: 11.7 FL (ref 9.2–12.9)
POTASSIUM SERPL-SCNC: 4.2 MMOL/L (ref 3.5–5.1)
PROT SERPL-MCNC: 6.7 G/DL (ref 6–8.4)
PROT UR QL STRIP: NEGATIVE
RBC # BLD AUTO: 5.53 M/UL (ref 4–5.4)
RBC #/AREA URNS HPF: 2 /HPF (ref 0–4)
SODIUM SERPL-SCNC: 127 MMOL/L (ref 136–145)
SP GR UR STRIP: 1.02 (ref 1–1.03)
SPECIMEN SOURCE: NORMAL
SQUAMOUS #/AREA URNS HPF: 2 /HPF
TROPONIN I SERPL HS-MCNC: 5.6 PG/ML (ref 0–14.9)
URN SPEC COLLECT METH UR: ABNORMAL
UROBILINOGEN UR STRIP-ACNC: NEGATIVE EU/DL
WBC # BLD AUTO: 9.83 K/UL (ref 3.9–12.7)
WBC #/AREA URNS HPF: 1 /HPF (ref 0–5)
YEAST URNS QL MICRO: ABNORMAL

## 2023-02-12 PROCEDURE — 93010 EKG 12-LEAD: ICD-10-PCS | Mod: ,,, | Performed by: INTERNAL MEDICINE

## 2023-02-12 PROCEDURE — 85025 COMPLETE CBC W/AUTO DIFF WBC: CPT | Performed by: EMERGENCY MEDICINE

## 2023-02-12 PROCEDURE — 82010 KETONE BODYS QUAN: CPT | Performed by: EMERGENCY MEDICINE

## 2023-02-12 PROCEDURE — 82962 GLUCOSE BLOOD TEST: CPT

## 2023-02-12 PROCEDURE — 80053 COMPREHEN METABOLIC PANEL: CPT | Performed by: EMERGENCY MEDICINE

## 2023-02-12 PROCEDURE — 93005 ELECTROCARDIOGRAM TRACING: CPT | Performed by: INTERNAL MEDICINE

## 2023-02-12 PROCEDURE — 87502 INFLUENZA DNA AMP PROBE: CPT | Performed by: EMERGENCY MEDICINE

## 2023-02-12 PROCEDURE — 63600175 PHARM REV CODE 636 W HCPCS: Performed by: EMERGENCY MEDICINE

## 2023-02-12 PROCEDURE — 83735 ASSAY OF MAGNESIUM: CPT | Performed by: EMERGENCY MEDICINE

## 2023-02-12 PROCEDURE — 81001 URINALYSIS AUTO W/SCOPE: CPT | Performed by: EMERGENCY MEDICINE

## 2023-02-12 PROCEDURE — 96361 HYDRATE IV INFUSION ADD-ON: CPT

## 2023-02-12 PROCEDURE — 25000003 PHARM REV CODE 250: Performed by: EMERGENCY MEDICINE

## 2023-02-12 PROCEDURE — 93010 ELECTROCARDIOGRAM REPORT: CPT | Mod: ,,, | Performed by: INTERNAL MEDICINE

## 2023-02-12 PROCEDURE — 99284 EMERGENCY DEPT VISIT MOD MDM: CPT | Mod: 25

## 2023-02-12 PROCEDURE — 96374 THER/PROPH/DIAG INJ IV PUSH: CPT

## 2023-02-12 PROCEDURE — 84484 ASSAY OF TROPONIN QUANT: CPT | Performed by: EMERGENCY MEDICINE

## 2023-02-12 RX ORDER — SODIUM CHLORIDE 9 MG/ML
1000 INJECTION, SOLUTION INTRAVENOUS
Status: DISCONTINUED | OUTPATIENT
Start: 2023-02-12 | End: 2023-02-12

## 2023-02-12 RX ORDER — FLUCONAZOLE 200 MG/1
200 TABLET ORAL DAILY
Qty: 7 TABLET | Refills: 0 | Status: SHIPPED | OUTPATIENT
Start: 2023-02-12 | End: 2023-02-19

## 2023-02-12 RX ORDER — SODIUM CHLORIDE 9 MG/ML
1000 INJECTION, SOLUTION INTRAVENOUS
Status: COMPLETED | OUTPATIENT
Start: 2023-02-12 | End: 2023-02-12

## 2023-02-12 RX ORDER — METFORMIN HYDROCHLORIDE 500 MG/1
500 TABLET ORAL 2 TIMES DAILY WITH MEALS
Qty: 60 TABLET | Refills: 0 | Status: SHIPPED | OUTPATIENT
Start: 2023-02-12 | End: 2023-05-17 | Stop reason: SDUPTHER

## 2023-02-12 RX ADMIN — SODIUM CHLORIDE 1000 ML: 0.9 INJECTION, SOLUTION INTRAVENOUS at 03:02

## 2023-02-12 RX ADMIN — HUMAN INSULIN 10 UNITS: 100 INJECTION, SOLUTION SUBCUTANEOUS at 03:02

## 2023-02-12 NOTE — ED PROVIDER NOTES
Encounter Date: 2/12/2023       History     Chief Complaint   Patient presents with    Fatigue     X few days and reports high blood sugar      Patient presents emergency department reported fatigue over last few days she also endorses polyuria polydipsia polyphasia states that she checked her blood sugar was high she concerned she may have developed diabetes she does have a strong family history of diabetes is never been diagnosed with diabetes herself she sees Dr. Canas for last blood work was over a year ago she denies any abdominal pain no nausea vomiting diarrhea no dysuria urgency or frequency no fever chills she denies any antecedent illness      Review of patient's allergies indicates:  No Known Allergies  Past Medical History:   Diagnosis Date    Cigarette nicotine dependence     Hypertension     Obesity     Vitamin D deficiency      Past Surgical History:   Procedure Laterality Date    BREAST SURGERY      knot on right    HYSTERECTOMY      TONSILLECTOMY       Family History   Problem Relation Age of Onset    Heart disease Mother     Heart disease Father     COPD Father     Breast cancer Maternal Grandmother      Social History     Tobacco Use    Smoking status: Every Day     Packs/day: 1.00     Years: 39.00     Pack years: 39.00     Types: Cigarettes    Smokeless tobacco: Never   Substance Use Topics    Alcohol use: Not Currently    Drug use: Not Currently     Review of Systems   Constitutional:  Positive for fatigue. Negative for chills and fever.   Respiratory:  Negative for cough and shortness of breath.    Cardiovascular:  Negative for chest pain.   Gastrointestinal:  Negative for abdominal pain, diarrhea, nausea and vomiting.   Endocrine: Positive for polydipsia, polyphagia and polyuria.   Genitourinary:  Positive for frequency. Negative for dysuria and urgency.   All other systems reviewed and are negative.    Physical Exam     Initial Vitals [02/12/23 1517]   BP Pulse Resp Temp SpO2   (!) 143/68 85 18  97.8 °F (36.6 °C) (!) 92 %      MAP       --         Physical Exam    Constitutional: She appears well-developed and well-nourished. No distress.   HENT:   Head: Normocephalic and atraumatic.   Right Ear: External ear normal.   Left Ear: External ear normal.   Mouth/Throat: Oropharynx is clear and moist.   Eyes: Conjunctivae and EOM are normal. Pupils are equal, round, and reactive to light.   Neck: Neck supple.   Normal range of motion.  Cardiovascular:  Normal rate, regular rhythm, normal heart sounds and intact distal pulses.           Pulmonary/Chest: Breath sounds normal. No respiratory distress.   Abdominal: Abdomen is soft. Bowel sounds are normal. There is no abdominal tenderness.   Musculoskeletal:         General: No edema. Normal range of motion.      Cervical back: Normal range of motion and neck supple.     Neurological: She is alert and oriented to person, place, and time. She has normal strength. GCS score is 15. GCS eye subscore is 4. GCS verbal subscore is 5. GCS motor subscore is 6.   Skin: Skin is warm and dry. Capillary refill takes less than 2 seconds. No rash noted.   Psychiatric: She has a normal mood and affect. Her behavior is normal.       ED Course   Procedures  Labs Reviewed   CBC W/ AUTO DIFFERENTIAL - Abnormal; Notable for the following components:       Result Value    RBC 5.53 (*)     Hemoglobin 16.7 (*)     Hematocrit 51.5 (*)     All other components within normal limits   COMPREHENSIVE METABOLIC PANEL - Abnormal; Notable for the following components:    Sodium 127 (*)     Chloride 88 (*)     CO2 30 (*)     Glucose 509 (*)     All other components within normal limits    Narrative:     Glucose critical result(s) repeated. Called and verbal readback   obtained from [facility], RN by SLKARISSA 02/12/2023 16:23   URINALYSIS, REFLEX TO URINE CULTURE - Abnormal; Notable for the following components:    Glucose, UA 4+ (*)     All other components within normal limits    Narrative:      Specimen Source->Urine   URINALYSIS MICROSCOPIC - Abnormal; Notable for the following components:    Yeast, UA Moderate (*)     Hyaline Casts, UA 2 (*)     All other components within normal limits    Narrative:     Specimen Source->Urine   POCT GLUCOSE - Abnormal; Notable for the following components:    POC Glucose 574 (*)     All other components within normal limits   POCT GLUCOSE - Abnormal; Notable for the following components:    POC Glucose 271 (*)     All other components within normal limits   INFLUENZA A AND B ANTIGEN    Narrative:     Specimen Source->Nasopharyngeal Swab   TROPONIN I HIGH SENSITIVITY   MAGNESIUM   BETA - HYDROXYBUTYRATE, SERUM   POCT GLUCOSE MONITORING CONTINUOUS   POCT GLUCOSE MONITORING CONTINUOUS        ECG Results              EKG 12-lead (In process)  Result time 02/12/23 15:28:33      In process by Interface, Lab In [facility] (02/12/23 15:28:33)                   Narrative:    Test Reason : R53.83,    Vent. Rate : 074 BPM     Atrial Rate : 000 BPM     P-R Int : 000 ms          QRS Dur : 088 ms      QT Int : 370 ms       P-R-T Axes : 000 009 013 degrees     QTc Int : 410 ms    Atrial fibrillation  Septal infarct (cited on or before 25-OCT-2021)  Abnormal ECG  When compared with ECG of 23-DEC-2021 13:06,  Questionable change in initial forces of Anterior-septal leads  Nonspecific T wave abnormality now evident in Anterior-lateral leads    Referred By: [email or web address]   SELF           Confirmed By:                                   Imaging Results              X-Ray Chest AP Portable (Final result)  Result time 02/12/23 15:40:53      Final result by Zuly Gill MD (02/12/23 15:40:53)                   Narrative:    XR CHEST 1 VIEW    CLINICAL HISTORY:  67 years Female Chest Pain    COMPARISON: 10/25/2021    FINDINGS: Cardiomediastinal silhouette is within normal limits. Lungs are normally expanded with no airspace consolidation. No pleural effusion or pneumothorax. No acute osseous  abnormality.    IMPRESSION: No acute pulmonary process.    Electronically signed by:  Zuly Gill MD  2/12/2023 3:40 PM [facility] Workstation: [id number]                                     Medications   insulin regular injection 10 Units 0.1 mL (10 Units Intravenous Given 2/12/23 9397)   0.9%  NaCl infusion (0 mLs Intravenous Stopped 2/12/23 1729)     Medical Decision Making:   Differential Diagnosis:   New onset diabetes, urinary tract infection, electrolyte abnormalities, dehydration  Clinical Tests:   Lab Tests: Ordered and Reviewed  Radiological Study: Ordered and Reviewed  Medical Tests: Ordered and Reviewed  ED Management:  Laboratory evaluation reviewed patient blood sugar was markedly elevated emergency department she was treated with insulin and IV fluids with improvement her blood sugar will begin metformin outpatient follow-up with Dr. Jenkins tomorrow for further evaluation treatment of her diabetes also found have a yeast infection in her urine will treat with Diflucan outpatient follow-up for resolution of this as well detailed diabetic diet discharge instructions given to patient                        Clinical Impression:   Final diagnoses:  [R53.83] Fatigue  [E11.9] Type 2 diabetes mellitus without complication, unspecified whether long term insulin use (Primary)  [N30.00] Acute cystitis without hematuria        ED Disposition Condition    Discharge Stable          ED Prescriptions       Medication Sig Dispense Start Date End Date Auth. Provider    metFORMIN (GLUCOPHAGE) 500 MG tablet Take 1 tablet (500 mg total) by mouth 2 (two) times daily with meals. 60 tablet 2/12/2023 2/12/2024 Antonio Monteiro MD    fluconazole (DIFLUCAN) 200 MG Tab Take 1 tablet (200 mg total) by mouth once daily. for 7 days 7 tablet 2/12/2023 2/19/2023 Antonio Monteiro MD          Follow-up Information       Follow up With Specialties Details Why Contact Info    Jerrod Stubbs MD Family Medicine Call in 1 day for  further evaluation and treatment [address] [facility]  [address]  [facility] [address]  [phone]               Antonio Monteiro MD  02/12/23 8094

## 2023-02-24 ENCOUNTER — TELEPHONE (OUTPATIENT)
Dept: CARDIOLOGY | Facility: CLINIC | Age: 68
End: 2023-02-24

## 2023-02-24 ENCOUNTER — OFFICE VISIT (OUTPATIENT)
Dept: FAMILY MEDICINE | Facility: CLINIC | Age: 68
End: 2023-02-24
Payer: MEDICARE

## 2023-02-24 VITALS
HEIGHT: 62 IN | SYSTOLIC BLOOD PRESSURE: 160 MMHG | RESPIRATION RATE: 18 BRPM | OXYGEN SATURATION: 97 % | BODY MASS INDEX: 39.56 KG/M2 | TEMPERATURE: 98 F | WEIGHT: 215 LBS | DIASTOLIC BLOOD PRESSURE: 92 MMHG | HEART RATE: 67 BPM

## 2023-02-24 DIAGNOSIS — Z23 IMMUNIZATION DUE: ICD-10-CM

## 2023-02-24 DIAGNOSIS — E66.01 CLASS 3 SEVERE OBESITY WITH BODY MASS INDEX (BMI) OF 40.0 TO 44.9 IN ADULT, UNSPECIFIED OBESITY TYPE, UNSPECIFIED WHETHER SERIOUS COMORBIDITY PRESENT: ICD-10-CM

## 2023-02-24 DIAGNOSIS — R53.83 FATIGUE, UNSPECIFIED TYPE: ICD-10-CM

## 2023-02-24 DIAGNOSIS — Z78.0 ASYMPTOMATIC MENOPAUSAL STATE: ICD-10-CM

## 2023-02-24 DIAGNOSIS — I73.89 ACROCYANOSIS: ICD-10-CM

## 2023-02-24 DIAGNOSIS — I35.0 MODERATE AORTIC STENOSIS: ICD-10-CM

## 2023-02-24 DIAGNOSIS — I48.91 ATRIAL FIBRILLATION BY ELECTROCARDIOGRAM: ICD-10-CM

## 2023-02-24 DIAGNOSIS — E11.9 TYPE 2 DIABETES MELLITUS WITHOUT COMPLICATION, WITHOUT LONG-TERM CURRENT USE OF INSULIN: Primary | ICD-10-CM

## 2023-02-24 DIAGNOSIS — Z13.820 OSTEOPOROSIS SCREENING: ICD-10-CM

## 2023-02-24 DIAGNOSIS — I50.9 CHRONIC CONGESTIVE HEART FAILURE, UNSPECIFIED HEART FAILURE TYPE: ICD-10-CM

## 2023-02-24 PROCEDURE — 3008F PR BODY MASS INDEX (BMI) DOCUMENTED: ICD-10-PCS | Mod: CPTII,S$GLB,, | Performed by: FAMILY MEDICINE

## 2023-02-24 PROCEDURE — 1101F PT FALLS ASSESS-DOCD LE1/YR: CPT | Mod: CPTII,S$GLB,, | Performed by: FAMILY MEDICINE

## 2023-02-24 PROCEDURE — 1126F PR PAIN SEVERITY QUANTIFIED, NO PAIN PRESENT: ICD-10-PCS | Mod: CPTII,S$GLB,, | Performed by: FAMILY MEDICINE

## 2023-02-24 PROCEDURE — 1101F PR PT FALLS ASSESS DOC 0-1 FALLS W/OUT INJ PAST YR: ICD-10-PCS | Mod: CPTII,S$GLB,, | Performed by: FAMILY MEDICINE

## 2023-02-24 PROCEDURE — 4010F ACE/ARB THERAPY RXD/TAKEN: CPT | Mod: CPTII,S$GLB,, | Performed by: FAMILY MEDICINE

## 2023-02-24 PROCEDURE — G0008 ADMIN INFLUENZA VIRUS VAC: HCPCS | Mod: S$GLB,,, | Performed by: FAMILY MEDICINE

## 2023-02-24 PROCEDURE — G0008 PR ADMIN INFLUENZA VIRUS VAC: ICD-10-PCS | Mod: S$GLB,,, | Performed by: FAMILY MEDICINE

## 2023-02-24 PROCEDURE — 3077F SYST BP >= 140 MM HG: CPT | Mod: CPTII,S$GLB,, | Performed by: FAMILY MEDICINE

## 2023-02-24 PROCEDURE — G0009 ADMIN PNEUMOCOCCAL VACCINE: HCPCS | Mod: S$GLB,,, | Performed by: FAMILY MEDICINE

## 2023-02-24 PROCEDURE — 99214 PR OFFICE/OUTPT VISIT, EST, LEVL IV, 30-39 MIN: ICD-10-PCS | Mod: S$GLB,,, | Performed by: FAMILY MEDICINE

## 2023-02-24 PROCEDURE — 4010F PR ACE/ARB THEARPY RXD/TAKEN: ICD-10-PCS | Mod: CPTII,S$GLB,, | Performed by: FAMILY MEDICINE

## 2023-02-24 PROCEDURE — 3080F DIAST BP >= 90 MM HG: CPT | Mod: CPTII,S$GLB,, | Performed by: FAMILY MEDICINE

## 2023-02-24 PROCEDURE — 1126F AMNT PAIN NOTED NONE PRSNT: CPT | Mod: CPTII,S$GLB,, | Performed by: FAMILY MEDICINE

## 2023-02-24 PROCEDURE — 3008F BODY MASS INDEX DOCD: CPT | Mod: CPTII,S$GLB,, | Performed by: FAMILY MEDICINE

## 2023-02-24 PROCEDURE — 90662 FLU VACCINE - QUADRIVALENT - HIGH DOSE (65+) PRESERVATIVE FREE IM: ICD-10-PCS | Mod: S$GLB,,, | Performed by: FAMILY MEDICINE

## 2023-02-24 PROCEDURE — 1159F MED LIST DOCD IN RCRD: CPT | Mod: CPTII,S$GLB,, | Performed by: FAMILY MEDICINE

## 2023-02-24 PROCEDURE — G0009 PNEUMOCOCCAL CONJUGATE VACCINE 20-VALENT: ICD-10-PCS | Mod: S$GLB,,, | Performed by: FAMILY MEDICINE

## 2023-02-24 PROCEDURE — 1159F PR MEDICATION LIST DOCUMENTED IN MEDICAL RECORD: ICD-10-PCS | Mod: CPTII,S$GLB,, | Performed by: FAMILY MEDICINE

## 2023-02-24 PROCEDURE — 90677 PNEUMOCOCCAL CONJUGATE VACCINE 20-VALENT: ICD-10-PCS | Mod: S$GLB,,, | Performed by: FAMILY MEDICINE

## 2023-02-24 PROCEDURE — 3077F PR MOST RECENT SYSTOLIC BLOOD PRESSURE >= 140 MM HG: ICD-10-PCS | Mod: CPTII,S$GLB,, | Performed by: FAMILY MEDICINE

## 2023-02-24 PROCEDURE — 90662 IIV NO PRSV INCREASED AG IM: CPT | Mod: S$GLB,,, | Performed by: FAMILY MEDICINE

## 2023-02-24 PROCEDURE — 3288F PR FALLS RISK ASSESSMENT DOCUMENTED: ICD-10-PCS | Mod: CPTII,S$GLB,, | Performed by: FAMILY MEDICINE

## 2023-02-24 PROCEDURE — 99214 OFFICE O/P EST MOD 30 MIN: CPT | Mod: S$GLB,,, | Performed by: FAMILY MEDICINE

## 2023-02-24 PROCEDURE — 90677 PCV20 VACCINE IM: CPT | Mod: S$GLB,,, | Performed by: FAMILY MEDICINE

## 2023-02-24 PROCEDURE — 3080F PR MOST RECENT DIASTOLIC BLOOD PRESSURE >= 90 MM HG: ICD-10-PCS | Mod: CPTII,S$GLB,, | Performed by: FAMILY MEDICINE

## 2023-02-24 PROCEDURE — 3288F FALL RISK ASSESSMENT DOCD: CPT | Mod: CPTII,S$GLB,, | Performed by: FAMILY MEDICINE

## 2023-02-24 RX ORDER — LANCETS
EACH MISCELLANEOUS
Qty: 200 EACH | Refills: 1 | Status: SHIPPED | OUTPATIENT
Start: 2023-02-24

## 2023-02-24 RX ORDER — METOPROLOL TARTRATE 25 MG/1
TABLET, FILM COATED ORAL
COMMUNITY
Start: 2023-02-09 | End: 2023-02-24

## 2023-02-24 RX ORDER — INSULIN PUMP SYRINGE, 3 ML
EACH MISCELLANEOUS
Qty: 1 EACH | Refills: 0 | Status: SHIPPED | OUTPATIENT
Start: 2023-02-24 | End: 2023-09-08 | Stop reason: SDUPTHER

## 2023-02-24 RX ORDER — GLIMEPIRIDE 2 MG/1
2 TABLET ORAL
Qty: 90 TABLET | Refills: 3 | Status: SHIPPED | OUTPATIENT
Start: 2023-02-24 | End: 2023-05-17 | Stop reason: SDUPTHER

## 2023-02-24 NOTE — TELEPHONE ENCOUNTER
----- Message from Jessica Guerrero sent at 2/24/2023 10:59 AM CST -----  Dr Stubbs sent us a referral for this established patient.  She needs to see Galilea for AFib controlled rate the systolic on diastolic CHF.  Please call and schedule.  THX

## 2023-02-24 NOTE — PROGRESS NOTES
Subjective:       Patient ID: Courtney Nino is a 67 y.o. female.    Chief Complaint: Diabetes      Patient is here as a hospital follow-up has newly diagnosed diabetes.  Patient Active Problem List:     Class 3 severe obesity with body mass index (BMI) of 40.0 to 44.9 in adult     Essential hypertension     Cigarette smoker- 1/2 ppd.     Acrocyanosis     Obstructive sleep apnea syndrome     Atrial fibrillation with RVR     CHF (congestive heart failure)     Moderate aortic stenosis untreated at this time.  Lab Results       Component                Value               Date                       WBC                      9.83                02/12/2023                 HGB                      16.7 (H)            02/12/2023                 HCT                      51.5 (H)            02/12/2023                 PLT                      273                 02/12/2023                 CHOL                     197                 02/22/2021                 TRIG                     74                  02/22/2021                 HDL                      41                  02/22/2021                 ALT                      22                  02/12/2023                 AST                      20                  02/12/2023                 NA                       127 (L)             02/12/2023                 K                        4.2                 02/12/2023                 CL                       88 (L)              02/12/2023                 CREATININE               0.9                 02/12/2023                 BUN                      13                  02/12/2023                 CO2                      30 (H)              02/12/2023                 TSH                      3.870               10/25/2021                 INR                      1.1                 10/25/2021                      Diabetes  She presents for her follow-up diabetic visit. She has type 2 diabetes mellitus. MedicAlert identification  noted. Associated symptoms include fatigue and weakness. Pertinent negatives for diabetes include no chest pain.     Allergies and Medications:   Review of patient's allergies indicates:  No Known Allergies  Current Outpatient Medications   Medication Sig Dispense Refill    apixaban (ELIQUIS) 5 mg Tab Take 1 tablet (5 mg total) by mouth 2 (two) times daily. 180 tablet 3    aspirin 325 MG tablet Take 325 mg by mouth once daily.      cholecalciferol, vitamin D3, 125 mcg (5,000 unit) Tab Take 5,000 Units by mouth once daily.      digoxin (LANOXIN) 125 mcg tablet Take 1 tablet (0.125 mg total) by mouth once daily. 90 tablet 3    diltiaZEM (CARDIZEM CD) 360 MG 24 hr capsule Take 1 capsule (360 mg total) by mouth once daily. 90 capsule 3    furosemide (LASIX) 40 MG tablet Take 1 tablet (40 mg total) by mouth 2 (two) times a day. 180 tablet 3    losartan (COZAAR) 50 MG tablet Take 1 tablet (50 mg total) by mouth once daily. 90 tablet 3    magnesium oxide (MAG-OX) 400 mg (241.3 mg magnesium) tablet Take 1 tablet (400 mg total) by mouth 2 (two) times daily. 90 tablet 3    metFORMIN (GLUCOPHAGE) 500 MG tablet Take 1 tablet (500 mg total) by mouth 2 (two) times daily with meals. 60 tablet 0    metoprolol tartrate 37.5 mg Tab Take 37.5 mg by mouth 2 (two) times daily. 180 tablet 3    omega-3 fatty acids/fish oil (FISH OIL-OMEGA-3 FATTY ACIDS) 300-1,000 mg capsule Take 1 capsule by mouth once daily.      potassium chloride SA (K-DUR,KLOR-CON) 20 MEQ tablet Take 20 mEq by mouth 2 (two) times daily.      vitamin E 1000 UNIT capsule Take 1,000 Units by mouth once daily.      blood sugar diagnostic Strp To check BG 2 times daily, to use with insurance preferred meter 200 strip 3    blood-glucose meter kit To check BG 2 times daily, to use with insurance preferred meter 1 each 0    glimepiride (AMARYL) 2 MG tablet Take 1 tablet (2 mg total) by mouth before breakfast. 90 tablet 3    lancets Misc To check BG 2 times daily, to use with  insurance preferred meter 200 each 1    metoprolol tartrate (LOPRESSOR) 25 MG tablet Take by mouth.       No current facility-administered medications for this visit.       Family History:   Family History   Problem Relation Age of Onset    Heart disease Mother     Heart disease Father     COPD Father     Breast cancer Maternal Grandmother        Social History:   Social History     Socioeconomic History    Marital status:    Tobacco Use    Smoking status: Every Day     Packs/day: 1.00     Years: 39.00     Pack years: 39.00     Types: Cigarettes    Smokeless tobacco: Never   Substance and Sexual Activity    Alcohol use: Not Currently    Drug use: Not Currently       Review of Systems   Constitutional:  Positive for fatigue.   Cardiovascular:  Positive for leg swelling (Mild with acrocyanosis). Negative for chest pain and palpitations.   Neurological:  Positive for weakness.     Objective:     Vitals:    02/24/23 0906   BP: (!) 160/92   Pulse: 67   Resp: 18   Temp: 98.2 °F (36.8 °C)        Physical Exam  Vitals and nursing note reviewed.   Constitutional:       General: She is not in acute distress.     Appearance: Normal appearance. She is well-developed. She is obese. She is not ill-appearing, toxic-appearing or diaphoretic.   HENT:      Head: Normocephalic and atraumatic.   Eyes:      Pupils: Pupils are equal, round, and reactive to light.   Cardiovascular:      Rate and Rhythm: Normal rate and regular rhythm.      Heart sounds: Normal heart sounds. No murmur heard.    No friction rub. No gallop.   Pulmonary:      Effort: Pulmonary effort is normal. No respiratory distress.      Breath sounds: Normal breath sounds. No stridor. No wheezing, rhonchi or rales.   Chest:      Chest wall: No tenderness.   Musculoskeletal:         General: No swelling, tenderness, deformity or signs of injury.      Right lower leg: No edema.      Left lower leg: No edema.      Comments: Good capillary refill sluggish venous  return   Neurological:      Mental Status: She is alert.   Psychiatric:         Behavior: Behavior normal.         Thought Content: Thought content normal.         Judgment: Judgment normal.       Assessment:       1. Type 2 diabetes mellitus without complication, without long-term current use of insulin    2. Class 3 severe obesity with body mass index (BMI) of 40.0 to 44.9 in adult, unspecified obesity type, unspecified whether serious comorbidity present    3. Chronic congestive heart failure, unspecified heart failure type    4. Fatigue, unspecified type    5. Immunization due    6. Osteoporosis screening    7. Asymptomatic menopausal state    8. Acrocyanosis    9. Atrial fibrillation by electrocardiogram    10. Moderate aortic stenosis        Plan:       Courtney was seen today for diabetes.    Diagnoses and all orders for this visit:    Type 2 diabetes mellitus without complication, without long-term current use of insulin  -     glimepiride (AMARYL) 2 MG tablet; Take 1 tablet (2 mg total) by mouth before breakfast.  -     Lipid Panel; Future  -     Comprehensive Metabolic Panel; Future  -     Hemoglobin A1C; Future  -     Microalbumin/Creatinine Ratio, Urine; Future  -     blood-glucose meter kit; To check BG 2 times daily, to use with insurance preferred meter  -     lancets Misc; To check BG 2 times daily, to use with insurance preferred meter  -     blood sugar diagnostic Strp; To check BG 2 times daily, to use with insurance preferred meter  -     Ambulatory referral/consult to Diabetes Education; Future    Class 3 severe obesity with body mass index (BMI) of 40.0 to 44.9 in adult, unspecified obesity type, unspecified whether serious comorbidity present  -     Vitamin D; Future    Chronic congestive heart failure, unspecified heart failure type  -     Ambulatory referral/consult to Cardiology; Future  -     Comprehensive Metabolic Panel; Future  -     BNP; Future    Fatigue, unspecified type  -     Vitamin  D; Future    Immunization due  -     Pneumococcal Conjugate Vaccine (20 Valent) (IM)  -     Influenza - Quadrivalent - High Dose (65+) (PF) (IM)    Osteoporosis screening  -     DXA Bone Density Axial Skeleton 1 or more sites; Future    Asymptomatic menopausal state  -     DXA Bone Density Axial Skeleton 1 or more sites; Future    Acrocyanosis    Atrial fibrillation by electrocardiogram    Moderate aortic stenosis         Follow up in about 1 month (around 3/24/2023).

## 2023-03-01 ENCOUNTER — HOSPITAL ENCOUNTER (OUTPATIENT)
Dept: RADIOLOGY | Facility: HOSPITAL | Age: 68
Discharge: HOME | End: 2023-03-01
Attending: FAMILY MEDICINE
Payer: MEDICARE

## 2023-03-01 DIAGNOSIS — Z78.0 ASYMPTOMATIC MENOPAUSAL STATE: ICD-10-CM

## 2023-03-01 DIAGNOSIS — M81.0 OSTEOPOROSIS, UNSPECIFIED OSTEOPOROSIS TYPE, UNSPECIFIED PATHOLOGICAL FRACTURE PRESENCE: Primary | ICD-10-CM

## 2023-03-01 DIAGNOSIS — Z13.820 OSTEOPOROSIS SCREENING: ICD-10-CM

## 2023-03-01 PROCEDURE — 77080 DXA BONE DENSITY AXIAL: CPT | Mod: TC,PO

## 2023-03-01 RX ORDER — BUTALB/ACETAMINOPHEN/CAFFEINE 50-325-40
1 TABLET ORAL 2 TIMES DAILY
Qty: 60 CAPSULE | Refills: 11 | Status: SHIPPED | OUTPATIENT
Start: 2023-03-01 | End: 2023-05-24 | Stop reason: SDUPTHER

## 2023-03-01 RX ORDER — IBANDRONATE SODIUM 150 MG/1
150 TABLET, FILM COATED ORAL
Qty: 1 TABLET | Refills: 11 | Status: SHIPPED | OUTPATIENT
Start: 2023-03-01 | End: 2024-03-28 | Stop reason: SDUPTHER

## 2023-03-01 NOTE — PROGRESS NOTES
Patient has osteoporosis at the hip.  I would recommend calcium and vitamin-D plus Boniva monthly and recheck the bone density in 2 years. I will send in orders.

## 2023-03-02 ENCOUNTER — TELEPHONE (OUTPATIENT)
Dept: FAMILY MEDICINE | Facility: CLINIC | Age: 68
End: 2023-03-02

## 2023-03-02 NOTE — TELEPHONE ENCOUNTER
----- Message from Jerrod Stubbs MD sent at 3/1/2023  4:28 PM CST -----  Patient has osteoporosis at the hip.  I would recommend calcium and vitamin-D plus Boniva monthly and recheck the bone density in 2 years. I will send in orders.

## 2023-03-07 RX ORDER — LANOLIN ALCOHOL/MO/W.PET/CERES
400 CREAM (GRAM) TOPICAL 2 TIMES DAILY
Qty: 90 TABLET | Refills: 3 | Status: SHIPPED | OUTPATIENT
Start: 2023-03-07

## 2023-03-07 RX ORDER — LOSARTAN POTASSIUM 50 MG/1
50 TABLET ORAL DAILY
Qty: 90 TABLET | Refills: 3 | Status: SHIPPED | OUTPATIENT
Start: 2023-03-07 | End: 2023-04-28 | Stop reason: SDUPTHER

## 2023-03-07 RX ORDER — METOPROLOL TARTRATE 37.5 MG/1
37.5 TABLET, FILM COATED ORAL 2 TIMES DAILY
Qty: 180 TABLET | Refills: 3 | Status: SHIPPED | OUTPATIENT
Start: 2023-03-07 | End: 2023-04-28 | Stop reason: SDUPTHER

## 2023-03-07 RX ORDER — FUROSEMIDE 40 MG/1
40 TABLET ORAL 2 TIMES DAILY
Qty: 180 TABLET | Refills: 3 | Status: SHIPPED | OUTPATIENT
Start: 2023-03-07

## 2023-03-07 RX ORDER — DIGOXIN 125 MCG
0.12 TABLET ORAL DAILY
Qty: 90 TABLET | Refills: 3 | Status: SHIPPED | OUTPATIENT
Start: 2023-03-07 | End: 2023-04-28 | Stop reason: SDUPTHER

## 2023-03-24 ENCOUNTER — LAB VISIT (OUTPATIENT)
Dept: LAB | Facility: HOSPITAL | Age: 68
End: 2023-03-24
Attending: FAMILY MEDICINE
Payer: MEDICARE

## 2023-03-24 DIAGNOSIS — E11.9 TYPE 2 DIABETES MELLITUS WITHOUT COMPLICATION, WITHOUT LONG-TERM CURRENT USE OF INSULIN: ICD-10-CM

## 2023-03-24 DIAGNOSIS — I50.9 CHRONIC CONGESTIVE HEART FAILURE, UNSPECIFIED HEART FAILURE TYPE: ICD-10-CM

## 2023-03-24 DIAGNOSIS — R53.83 FATIGUE, UNSPECIFIED TYPE: ICD-10-CM

## 2023-03-24 DIAGNOSIS — E66.01 CLASS 3 SEVERE OBESITY WITH BODY MASS INDEX (BMI) OF 40.0 TO 44.9 IN ADULT, UNSPECIFIED OBESITY TYPE, UNSPECIFIED WHETHER SERIOUS COMORBIDITY PRESENT: ICD-10-CM

## 2023-03-24 DIAGNOSIS — E78.2 MIXED HYPERLIPIDEMIA: Primary | ICD-10-CM

## 2023-03-24 LAB
25(OH)D3+25(OH)D2 SERPL-MCNC: 28 NG/ML (ref 30–96)
ALBUMIN SERPL BCP-MCNC: 3.8 G/DL (ref 3.5–5.2)
ALBUMIN/CREAT UR: 6.4 UG/MG (ref 0–30)
ALP SERPL-CCNC: 77 U/L (ref 55–135)
ALT SERPL W/O P-5'-P-CCNC: 18 U/L (ref 10–44)
ANION GAP SERPL CALC-SCNC: 12 MMOL/L (ref 8–16)
AST SERPL-CCNC: 15 U/L (ref 10–40)
BILIRUB SERPL-MCNC: 0.3 MG/DL (ref 0.1–1)
BNP SERPL-MCNC: 82 PG/ML (ref 0–99)
BUN SERPL-MCNC: 14 MG/DL (ref 8–23)
CALCIUM SERPL-MCNC: 9.6 MG/DL (ref 8.7–10.5)
CHLORIDE SERPL-SCNC: 97 MMOL/L (ref 95–110)
CHOLEST SERPL-MCNC: 223 MG/DL (ref 120–199)
CHOLEST/HDLC SERPL: 6.4 {RATIO} (ref 2–5)
CO2 SERPL-SCNC: 31 MMOL/L (ref 23–29)
CREAT SERPL-MCNC: 0.9 MG/DL (ref 0.5–1.4)
CREAT UR-MCNC: 199 MG/DL (ref 15–325)
EST. GFR  (NO RACE VARIABLE): >60 ML/MIN/1.73 M^2
ESTIMATED AVG GLUCOSE: 255 MG/DL (ref 68–131)
GLUCOSE SERPL-MCNC: 155 MG/DL (ref 70–110)
HBA1C MFR BLD: 10.5 % (ref 4.5–6.2)
HDLC SERPL-MCNC: 35 MG/DL (ref 40–75)
HDLC SERPL: 15.7 % (ref 20–50)
LDLC SERPL CALC-MCNC: 152 MG/DL (ref 63–159)
MICROALBUMIN UR DL<=1MG/L-MCNC: 12.8 UG/ML
NONHDLC SERPL-MCNC: 188 MG/DL
POTASSIUM SERPL-SCNC: 4.1 MMOL/L (ref 3.5–5.1)
PROT SERPL-MCNC: 7.1 G/DL (ref 6–8.4)
SODIUM SERPL-SCNC: 140 MMOL/L (ref 136–145)
TRIGL SERPL-MCNC: 180 MG/DL (ref 30–150)

## 2023-03-24 PROCEDURE — 83880 ASSAY OF NATRIURETIC PEPTIDE: CPT | Performed by: FAMILY MEDICINE

## 2023-03-24 PROCEDURE — 80053 COMPREHEN METABOLIC PANEL: CPT | Performed by: FAMILY MEDICINE

## 2023-03-24 PROCEDURE — 82570 ASSAY OF URINE CREATININE: CPT | Performed by: FAMILY MEDICINE

## 2023-03-24 PROCEDURE — 36415 COLL VENOUS BLD VENIPUNCTURE: CPT | Performed by: FAMILY MEDICINE

## 2023-03-24 PROCEDURE — 83036 HEMOGLOBIN GLYCOSYLATED A1C: CPT | Performed by: FAMILY MEDICINE

## 2023-03-24 PROCEDURE — 80061 LIPID PANEL: CPT | Performed by: FAMILY MEDICINE

## 2023-03-24 PROCEDURE — 82306 VITAMIN D 25 HYDROXY: CPT | Performed by: FAMILY MEDICINE

## 2023-03-24 RX ORDER — PRAVASTATIN SODIUM 20 MG/1
20 TABLET ORAL DAILY
Qty: 90 TABLET | Refills: 3 | Status: SHIPPED | OUTPATIENT
Start: 2023-03-24 | End: 2023-04-28 | Stop reason: SDUPTHER

## 2023-03-27 ENCOUNTER — TELEPHONE (OUTPATIENT)
Dept: FAMILY MEDICINE | Facility: CLINIC | Age: 68
End: 2023-03-27

## 2023-03-29 ENCOUNTER — TELEPHONE (OUTPATIENT)
Dept: FAMILY MEDICINE | Facility: CLINIC | Age: 68
End: 2023-03-29

## 2023-04-25 ENCOUNTER — TELEPHONE (OUTPATIENT)
Dept: DIABETES | Facility: CLINIC | Age: 68
End: 2023-04-25

## 2023-04-28 DIAGNOSIS — E78.2 MIXED HYPERLIPIDEMIA: ICD-10-CM

## 2023-04-28 DIAGNOSIS — I10 ESSENTIAL HYPERTENSION: Primary | ICD-10-CM

## 2023-04-28 DIAGNOSIS — I48.91 ATRIAL FIBRILLATION BY ELECTROCARDIOGRAM: ICD-10-CM

## 2023-04-28 RX ORDER — PRAVASTATIN SODIUM 20 MG/1
20 TABLET ORAL DAILY
Qty: 90 TABLET | Refills: 1 | Status: SHIPPED | OUTPATIENT
Start: 2023-04-28 | End: 2023-05-17

## 2023-04-28 RX ORDER — DIGOXIN 125 MCG
0.12 TABLET ORAL DAILY
Qty: 90 TABLET | Refills: 1 | Status: SHIPPED | OUTPATIENT
Start: 2023-04-28 | End: 2024-02-22

## 2023-04-28 RX ORDER — LOSARTAN POTASSIUM 50 MG/1
50 TABLET ORAL DAILY
Qty: 90 TABLET | Refills: 1 | Status: SHIPPED | OUTPATIENT
Start: 2023-04-28 | End: 2024-02-22

## 2023-04-28 RX ORDER — METOPROLOL TARTRATE 37.5 MG/1
37.5 TABLET, FILM COATED ORAL 2 TIMES DAILY
Qty: 180 TABLET | Refills: 1 | Status: SHIPPED | OUTPATIENT
Start: 2023-04-28 | End: 2023-05-17

## 2023-05-17 ENCOUNTER — OFFICE VISIT (OUTPATIENT)
Dept: FAMILY MEDICINE | Facility: CLINIC | Age: 68
End: 2023-05-17
Payer: MEDICARE

## 2023-05-17 VITALS
SYSTOLIC BLOOD PRESSURE: 130 MMHG | HEIGHT: 62 IN | BODY MASS INDEX: 39.75 KG/M2 | WEIGHT: 216 LBS | OXYGEN SATURATION: 97 % | RESPIRATION RATE: 18 BRPM | TEMPERATURE: 99 F | HEART RATE: 94 BPM | DIASTOLIC BLOOD PRESSURE: 76 MMHG

## 2023-05-17 DIAGNOSIS — E66.01 CLASS 3 SEVERE OBESITY WITH BODY MASS INDEX (BMI) OF 40.0 TO 44.9 IN ADULT, UNSPECIFIED OBESITY TYPE, UNSPECIFIED WHETHER SERIOUS COMORBIDITY PRESENT: Primary | ICD-10-CM

## 2023-05-17 DIAGNOSIS — I48.91 ATRIAL FIBRILLATION WITH RVR: ICD-10-CM

## 2023-05-17 DIAGNOSIS — I10 ESSENTIAL HYPERTENSION: ICD-10-CM

## 2023-05-17 DIAGNOSIS — I48.91 ATRIAL FIBRILLATION BY ELECTROCARDIOGRAM: ICD-10-CM

## 2023-05-17 DIAGNOSIS — E11.9 TYPE 2 DIABETES MELLITUS WITHOUT COMPLICATION, WITHOUT LONG-TERM CURRENT USE OF INSULIN: ICD-10-CM

## 2023-05-17 DIAGNOSIS — E78.2 MIXED HYPERLIPIDEMIA: ICD-10-CM

## 2023-05-17 DIAGNOSIS — E55.9 VITAMIN D DEFICIENCY: ICD-10-CM

## 2023-05-17 PROCEDURE — 3008F PR BODY MASS INDEX (BMI) DOCUMENTED: ICD-10-PCS | Mod: CPTII,,, | Performed by: FAMILY MEDICINE

## 2023-05-17 PROCEDURE — 3061F NEG MICROALBUMINURIA REV: CPT | Mod: CPTII,,, | Performed by: FAMILY MEDICINE

## 2023-05-17 PROCEDURE — 3046F PR MOST RECENT HEMOGLOBIN A1C LEVEL > 9.0%: ICD-10-PCS | Mod: CPTII,,, | Performed by: FAMILY MEDICINE

## 2023-05-17 PROCEDURE — 1160F PR REVIEW ALL MEDS BY PRESCRIBER/CLIN PHARMACIST DOCUMENTED: ICD-10-PCS | Mod: CPTII,,, | Performed by: FAMILY MEDICINE

## 2023-05-17 PROCEDURE — 3288F FALL RISK ASSESSMENT DOCD: CPT | Mod: CPTII,,, | Performed by: FAMILY MEDICINE

## 2023-05-17 PROCEDURE — 3078F PR MOST RECENT DIASTOLIC BLOOD PRESSURE < 80 MM HG: ICD-10-PCS | Mod: CPTII,,, | Performed by: FAMILY MEDICINE

## 2023-05-17 PROCEDURE — 3078F DIAST BP <80 MM HG: CPT | Mod: CPTII,,, | Performed by: FAMILY MEDICINE

## 2023-05-17 PROCEDURE — 4010F ACE/ARB THERAPY RXD/TAKEN: CPT | Mod: CPTII,,, | Performed by: FAMILY MEDICINE

## 2023-05-17 PROCEDURE — 93000 POCT EKG 12-LEAD: ICD-10-PCS | Mod: S$GLB,,, | Performed by: FAMILY MEDICINE

## 2023-05-17 PROCEDURE — 1126F AMNT PAIN NOTED NONE PRSNT: CPT | Mod: CPTII,,, | Performed by: FAMILY MEDICINE

## 2023-05-17 PROCEDURE — 1159F PR MEDICATION LIST DOCUMENTED IN MEDICAL RECORD: ICD-10-PCS | Mod: CPTII,,, | Performed by: FAMILY MEDICINE

## 2023-05-17 PROCEDURE — 99214 PR OFFICE/OUTPT VISIT, EST, LEVL IV, 30-39 MIN: ICD-10-PCS | Mod: 25,,, | Performed by: FAMILY MEDICINE

## 2023-05-17 PROCEDURE — 3008F BODY MASS INDEX DOCD: CPT | Mod: CPTII,,, | Performed by: FAMILY MEDICINE

## 2023-05-17 PROCEDURE — 3066F NEPHROPATHY DOC TX: CPT | Mod: CPTII,,, | Performed by: FAMILY MEDICINE

## 2023-05-17 PROCEDURE — 3061F PR NEG MICROALBUMINURIA RESULT DOCUMENTED/REVIEW: ICD-10-PCS | Mod: CPTII,,, | Performed by: FAMILY MEDICINE

## 2023-05-17 PROCEDURE — 1126F PR PAIN SEVERITY QUANTIFIED, NO PAIN PRESENT: ICD-10-PCS | Mod: CPTII,,, | Performed by: FAMILY MEDICINE

## 2023-05-17 PROCEDURE — 1160F RVW MEDS BY RX/DR IN RCRD: CPT | Mod: CPTII,,, | Performed by: FAMILY MEDICINE

## 2023-05-17 PROCEDURE — 99214 OFFICE O/P EST MOD 30 MIN: CPT | Mod: 25,,, | Performed by: FAMILY MEDICINE

## 2023-05-17 PROCEDURE — 1101F PR PT FALLS ASSESS DOC 0-1 FALLS W/OUT INJ PAST YR: ICD-10-PCS | Mod: CPTII,,, | Performed by: FAMILY MEDICINE

## 2023-05-17 PROCEDURE — 3075F PR MOST RECENT SYSTOLIC BLOOD PRESS GE 130-139MM HG: ICD-10-PCS | Mod: CPTII,,, | Performed by: FAMILY MEDICINE

## 2023-05-17 PROCEDURE — 3046F HEMOGLOBIN A1C LEVEL >9.0%: CPT | Mod: CPTII,,, | Performed by: FAMILY MEDICINE

## 2023-05-17 PROCEDURE — 3288F PR FALLS RISK ASSESSMENT DOCUMENTED: ICD-10-PCS | Mod: CPTII,,, | Performed by: FAMILY MEDICINE

## 2023-05-17 PROCEDURE — 3075F SYST BP GE 130 - 139MM HG: CPT | Mod: CPTII,,, | Performed by: FAMILY MEDICINE

## 2023-05-17 PROCEDURE — 4010F PR ACE/ARB THEARPY RXD/TAKEN: ICD-10-PCS | Mod: CPTII,,, | Performed by: FAMILY MEDICINE

## 2023-05-17 PROCEDURE — 3066F PR DOCUMENTATION OF TREATMENT FOR NEPHROPATHY: ICD-10-PCS | Mod: CPTII,,, | Performed by: FAMILY MEDICINE

## 2023-05-17 PROCEDURE — 93000 ELECTROCARDIOGRAM COMPLETE: CPT | Mod: S$GLB,,, | Performed by: FAMILY MEDICINE

## 2023-05-17 PROCEDURE — 1159F MED LIST DOCD IN RCRD: CPT | Mod: CPTII,,, | Performed by: FAMILY MEDICINE

## 2023-05-17 PROCEDURE — 1101F PT FALLS ASSESS-DOCD LE1/YR: CPT | Mod: CPTII,,, | Performed by: FAMILY MEDICINE

## 2023-05-17 RX ORDER — DULAGLUTIDE 0.75 MG/.5ML
INJECTION, SOLUTION SUBCUTANEOUS
Qty: 28 PEN | Refills: 0 | Status: SHIPPED | OUTPATIENT
Start: 2023-05-17 | End: 2023-05-24 | Stop reason: SDUPTHER

## 2023-05-17 RX ORDER — GLIMEPIRIDE 2 MG/1
2 TABLET ORAL
Qty: 90 TABLET | Refills: 3 | Status: SHIPPED | OUTPATIENT
Start: 2023-05-17 | End: 2023-05-24 | Stop reason: SDUPTHER

## 2023-05-17 RX ORDER — METOPROLOL SUCCINATE 100 MG/1
100 TABLET, EXTENDED RELEASE ORAL DAILY
Qty: 30 TABLET | Refills: 11 | Status: SHIPPED | OUTPATIENT
Start: 2023-05-17 | End: 2024-05-16

## 2023-05-17 RX ORDER — PRAVASTATIN SODIUM 40 MG/1
40 TABLET ORAL DAILY
Qty: 90 TABLET | Refills: 3 | Status: SHIPPED | OUTPATIENT
Start: 2023-05-17 | End: 2024-03-27 | Stop reason: SDUPTHER

## 2023-05-17 RX ORDER — METFORMIN HYDROCHLORIDE 500 MG/1
500 TABLET ORAL 2 TIMES DAILY WITH MEALS
Qty: 60 TABLET | Refills: 0 | Status: SHIPPED | OUTPATIENT
Start: 2023-05-17 | End: 2023-05-24 | Stop reason: SDUPTHER

## 2023-05-17 RX ORDER — ERGOCALCIFEROL 1.25 MG/1
50000 CAPSULE ORAL
Qty: 4 CAPSULE | Refills: 11 | Status: SHIPPED | OUTPATIENT
Start: 2023-05-17 | End: 2024-05-16

## 2023-05-17 NOTE — PROGRESS NOTES
Subjective:       Patient ID: Courtney Nino is a 68 y.o. female.    Chief Complaint: Hypertension and Diabetes      Patient here today for follow-up on hypertension and diabetes .  She reports her blood pressures have been pretty well controlled at home.  BP Readings from Last 3 Encounters:  05/17/23 : 130/76  02/24/23 : (!) 160/92  02/12/23 : 116/69  Lab Results       Component                Value               Date                       WBC                      9.83                02/12/2023                 HGB                      16.7 (H)            02/12/2023                 HCT                      51.5 (H)            02/12/2023                 PLT                      273                 02/12/2023                 CHOL                     223 (H)             03/24/2023                 TRIG                     180 (H)             03/24/2023                 HDL                      35 (L)              03/24/2023                 ALT                      18                  03/24/2023                 AST                      15                  03/24/2023                 NA                       140                 03/24/2023                 K                        4.1                 03/24/2023                 CL                       97                  03/24/2023                 CREATININE               0.9                 03/24/2023                 BUN                      14                  03/24/2023                 CO2                      31 (H)              03/24/2023                 TSH                      3.870               10/25/2021                 INR                      1.1                 10/25/2021                 HGBA1C                   10.5 (H)            03/24/2023                    Hypertension  This is a chronic problem. The current episode started today. The problem has been gradually improving since onset. Pertinent negatives include no anxiety, blurred vision, chest pain or  headaches.   Diabetes  She presents for her follow-up diabetic visit. She has type 2 diabetes mellitus. No MedicAlert identification noted. Her disease course has been stable. Pertinent negatives for hypoglycemia include no headaches. Pertinent negatives for diabetes include no blurred vision and no chest pain. Her breakfast blood glucose range is generally 110-130 mg/dl. Her lunch blood glucose range is generally 140-180 mg/dl. Her dinner blood glucose range is generally 140-180 mg/dl. (Only has to measurements from home.)     Allergies and Medications:   Review of patient's allergies indicates:  No Known Allergies  Current Outpatient Medications   Medication Sig Dispense Refill    aspirin 325 MG tablet Take 325 mg by mouth once daily.      blood sugar diagnostic Strp To check BG 2 times daily, to use with insurance preferred meter 200 strip 3    blood-glucose meter kit To check BG 2 times daily, to use with insurance preferred meter 1 each 0    calcium carbonate-vitamin D3 (LIQUID CALCIUM WITH VITAMIN D) 600 mg-5 mcg (200 unit) Cap Take 1 capsule by mouth 2 (two) times daily. for 365 doses 60 capsule 11    digoxin (LANOXIN) 125 mcg tablet Take 1 tablet (0.125 mg total) by mouth once daily. 90 tablet 1    diltiaZEM (CARDIZEM CD) 360 MG 24 hr capsule Take 1 capsule (360 mg total) by mouth once daily. 90 capsule 3    furosemide (LASIX) 40 MG tablet Take 1 tablet (40 mg total) by mouth 2 (two) times a day. 180 tablet 3    ibandronate (BONIVA) 150 mg tablet Take 1 tablet (150 mg total) by mouth every 30 days. 1 tablet 11    lancets Misc To check BG 2 times daily, to use with insurance preferred meter 200 each 1    losartan (COZAAR) 50 MG tablet Take 1 tablet (50 mg total) by mouth once daily. 90 tablet 1    magnesium oxide (MAG-OX) 400 mg (241.3 mg magnesium) tablet Take 1 tablet (400 mg total) by mouth 2 (two) times daily. 90 tablet 3    omega-3 fatty acids/fish oil (FISH OIL-OMEGA-3 FATTY ACIDS) 300-1,000 mg capsule  Take 1 capsule by mouth once daily.      potassium chloride SA (K-DUR,KLOR-CON) 20 MEQ tablet Take 20 mEq by mouth 2 (two) times daily.      vitamin E 1000 UNIT capsule Take 1,000 Units by mouth once daily.      apixaban (ELIQUIS) 5 mg Tab Take 1 tablet (5 mg total) by mouth 2 (two) times daily. 180 tablet 1    dulaglutide (TRULICITY) 0.75 mg/0.5 mL pen injector Inject 0.75 mg into the skin every 7 days for 28 days, THEN 1.5 mg every 7 days. 28 pen 0    ergocalciferol (ERGOCALCIFEROL) 50,000 unit Cap Take 1 capsule (50,000 Units total) by mouth every 7 days. 4 capsule 11    glimepiride (AMARYL) 2 MG tablet Take 1 tablet (2 mg total) by mouth before breakfast. 90 tablet 3    metFORMIN (GLUCOPHAGE) 500 MG tablet Take 1 tablet (500 mg total) by mouth 2 (two) times daily with meals. 60 tablet 0    metoprolol succinate (TOPROL-XL) 100 MG 24 hr tablet Take 1 tablet (100 mg total) by mouth once daily. 30 tablet 11    pravastatin (PRAVACHOL) 40 MG tablet Take 1 tablet (40 mg total) by mouth once daily. 90 tablet 3     No current facility-administered medications for this visit.       Family History:   Family History   Problem Relation Age of Onset    Heart disease Mother     Heart disease Father     COPD Father     Breast cancer Maternal Grandmother        Social History:   Social History     Socioeconomic History    Marital status:    Tobacco Use    Smoking status: Every Day     Packs/day: 1.00     Years: 39.00     Pack years: 39.00     Types: Cigarettes    Smokeless tobacco: Never   Substance and Sexual Activity    Alcohol use: Not Currently    Drug use: Not Currently       Review of Systems   Eyes:  Negative for blurred vision.   Cardiovascular:  Negative for chest pain.   Neurological:  Negative for headaches.     Objective:     Vitals:    05/17/23 1324   BP: 130/76   Pulse: 94   Resp: 18   Temp: 98.7 °F (37.1 °C)        Physical Exam  Vitals and nursing note reviewed.   Constitutional:       General: She is not  in acute distress.     Appearance: Normal appearance. She is well-developed and normal weight. She is not ill-appearing, toxic-appearing or diaphoretic.   HENT:      Head: Normocephalic and atraumatic.   Eyes:      Pupils: Pupils are equal, round, and reactive to light.   Cardiovascular:      Rate and Rhythm: Tachycardia present. Rhythm irregular.      Pulses:           Dorsalis pedis pulses are 2+ on the right side and 2+ on the left side.        Posterior tibial pulses are 2+ on the right side and 2+ on the left side.      Heart sounds: Normal heart sounds. No murmur heard.    No friction rub. No gallop.   Pulmonary:      Effort: Pulmonary effort is normal. No respiratory distress.      Breath sounds: Normal breath sounds. No stridor. No wheezing, rhonchi or rales.   Chest:      Chest wall: No tenderness.   Musculoskeletal:      Right lower leg: No edema.      Left lower leg: No edema.      Right foot: Normal range of motion. Deformity present. No bunion, Charcot foot, foot drop or prominent metatarsal heads.      Left foot: Normal range of motion. Deformity (bilateral acrocyanosis.) present. No bunion, Charcot foot, foot drop or prominent metatarsal heads.   Feet:      Right foot:      Protective Sensation: 3 sites tested.  3 sites sensed.      Skin integrity: No ulcer, blister, skin breakdown, erythema, warmth, callus or dry skin.      Left foot:      Protective Sensation: 3 sites tested.  3 sites sensed.      Skin integrity: No ulcer, blister, skin breakdown, erythema, warmth, callus or dry skin.   Neurological:      Mental Status: She is alert.   Psychiatric:         Behavior: Behavior normal.         Thought Content: Thought content normal.         Judgment: Judgment normal.     EKG shows a atrial fibrillation rhythm with a heart rate of 127 uncontrolled.  Assessment:       1. Class 3 severe obesity with body mass index (BMI) of 40.0 to 44.9 in adult, unspecified obesity type, unspecified whether serious  comorbidity present    2. Atrial fibrillation by electrocardiogram    3. Type 2 diabetes mellitus without complication, without long-term current use of insulin    4. Essential hypertension    5. Vitamin D deficiency    6. Mixed hyperlipidemia    7. Atrial fibrillation with RVR        Plan:       Courtney was seen today for hypertension and diabetes.    Diagnoses and all orders for this visit:    Class 3 severe obesity with body mass index (BMI) of 40.0 to 44.9 in adult, unspecified obesity type, unspecified whether serious comorbidity present    Atrial fibrillation by electrocardiogram  -     apixaban (ELIQUIS) 5 mg Tab; Take 1 tablet (5 mg total) by mouth 2 (two) times daily.    Type 2 diabetes mellitus without complication, without long-term current use of insulin  -     glimepiride (AMARYL) 2 MG tablet; Take 1 tablet (2 mg total) by mouth before breakfast.  -     metFORMIN (GLUCOPHAGE) 500 MG tablet; Take 1 tablet (500 mg total) by mouth 2 (two) times daily with meals.  -     dulaglutide (TRULICITY) 0.75 mg/0.5 mL pen injector; Inject 0.75 mg into the skin every 7 days for 28 days, THEN 1.5 mg every 7 days.  -     Ambulatory referral/consult to Optometry; Future    Essential hypertension    Vitamin D deficiency  -     ergocalciferol (ERGOCALCIFEROL) 50,000 unit Cap; Take 1 capsule (50,000 Units total) by mouth every 7 days.    Mixed hyperlipidemia  -     pravastatin (PRAVACHOL) 40 MG tablet; Take 1 tablet (40 mg total) by mouth once daily.    Atrial fibrillation with RVR  -     POCT EKG 12-LEAD (NOT FOR [facility] USE)  -     metoprolol succinate (TOPROL-XL) 100 MG 24 hr tablet; Take 1 tablet (100 mg total) by mouth once daily.         Follow up in about 1 week (around 5/24/2023) for follow up atrial fibrillation and RVR..

## 2023-05-23 ENCOUNTER — TELEPHONE (OUTPATIENT)
Dept: FAMILY MEDICINE | Facility: CLINIC | Age: 68
End: 2023-05-23

## 2023-05-24 DIAGNOSIS — M81.0 OSTEOPOROSIS, UNSPECIFIED OSTEOPOROSIS TYPE, UNSPECIFIED PATHOLOGICAL FRACTURE PRESENCE: ICD-10-CM

## 2023-05-24 DIAGNOSIS — I48.91 ATRIAL FIBRILLATION BY ELECTROCARDIOGRAM: ICD-10-CM

## 2023-05-24 DIAGNOSIS — E11.9 TYPE 2 DIABETES MELLITUS WITHOUT COMPLICATION, WITHOUT LONG-TERM CURRENT USE OF INSULIN: ICD-10-CM

## 2023-05-24 RX ORDER — BUTALB/ACETAMINOPHEN/CAFFEINE 50-325-40
1 TABLET ORAL 2 TIMES DAILY
Qty: 60 CAPSULE | Refills: 11 | Status: SHIPPED | OUTPATIENT
Start: 2023-05-24 | End: 2024-02-22

## 2023-05-24 RX ORDER — GLIMEPIRIDE 2 MG/1
2 TABLET ORAL
Qty: 90 TABLET | Refills: 3 | Status: SHIPPED | OUTPATIENT
Start: 2023-05-24 | End: 2024-05-23

## 2023-05-24 RX ORDER — METFORMIN HYDROCHLORIDE 500 MG/1
500 TABLET ORAL 2 TIMES DAILY WITH MEALS
Qty: 60 TABLET | Refills: 0 | Status: SHIPPED | OUTPATIENT
Start: 2023-05-24 | End: 2024-02-02 | Stop reason: SDUPTHER

## 2023-05-24 RX ORDER — DULAGLUTIDE 0.75 MG/.5ML
INJECTION, SOLUTION SUBCUTANEOUS
Qty: 28 PEN | Refills: 0 | Status: SHIPPED | OUTPATIENT
Start: 2023-05-24 | End: 2023-05-31

## 2023-05-24 NOTE — TELEPHONE ENCOUNTER
Patient states her appt is 5-25-23 and she has not had any of her new meds. The follow up was to see how effective they are.

## 2023-05-29 PROBLEM — Z13.820 OSTEOPOROSIS SCREENING: Status: RESOLVED | Noted: 2021-02-11 | Resolved: 2023-05-29

## 2023-05-31 ENCOUNTER — TELEPHONE (OUTPATIENT)
Dept: FAMILY MEDICINE | Facility: CLINIC | Age: 68
End: 2023-05-31

## 2023-05-31 DIAGNOSIS — E11.9 TYPE 2 DIABETES MELLITUS WITHOUT COMPLICATION, WITHOUT LONG-TERM CURRENT USE OF INSULIN: Primary | ICD-10-CM

## 2023-05-31 DIAGNOSIS — E11.9 TYPE 2 DIABETES MELLITUS WITHOUT COMPLICATION, WITHOUT LONG-TERM CURRENT USE OF INSULIN: ICD-10-CM

## 2023-05-31 RX ORDER — DULAGLUTIDE 1.5 MG/.5ML
1.5 INJECTION, SOLUTION SUBCUTANEOUS
Qty: 12 PEN | Refills: 3 | Status: SHIPPED | OUTPATIENT
Start: 2023-06-22 | End: 2024-02-02 | Stop reason: SDUPTHER

## 2023-06-16 LAB
LEFT EYE DM RETINOPATHY: POSITIVE
RIGHT EYE DM RETINOPATHY: POSITIVE

## 2023-09-08 DIAGNOSIS — I48.91 ATRIAL FIBRILLATION BY ELECTROCARDIOGRAM: ICD-10-CM

## 2023-09-08 DIAGNOSIS — E11.9 TYPE 2 DIABETES MELLITUS WITHOUT COMPLICATION, WITHOUT LONG-TERM CURRENT USE OF INSULIN: ICD-10-CM

## 2023-09-08 RX ORDER — ISOPROPYL ALCOHOL 70 ML/100ML
1 SWAB TOPICAL
Qty: 200 EACH | Refills: 3 | Status: SHIPPED | OUTPATIENT
Start: 2023-09-08

## 2023-09-08 RX ORDER — INSULIN PUMP SYRINGE, 3 ML
EACH MISCELLANEOUS
Qty: 1 EACH | Refills: 0 | Status: SHIPPED | OUTPATIENT
Start: 2023-09-08 | End: 2024-09-07

## 2023-09-08 RX ORDER — LANCETS 33 GAUGE
1 EACH MISCELLANEOUS 2 TIMES DAILY
Qty: 200 EACH | Refills: 3 | Status: SHIPPED | OUTPATIENT
Start: 2023-09-08

## 2024-01-30 DIAGNOSIS — I50.9 CONGESTIVE HEART FAILURE, UNSPECIFIED HF CHRONICITY, UNSPECIFIED HEART FAILURE TYPE: ICD-10-CM

## 2024-01-30 DIAGNOSIS — I48.91 ATRIAL FIBRILLATION WITH RVR: ICD-10-CM

## 2024-01-30 NOTE — TELEPHONE ENCOUNTER
----- Message from Declan Weinberg, Patient Care Assistant sent at 1/30/2024 10:51 AM CST -----  Contact: Pt  Type:  RX Refill Request    Who Called:  Pt  Refill or New Rx:  Refill  RX Name and Strength:  diltiaZEM (CARDIZEM CD) 360 MG 24 hr capsule  How is the patient currently taking it? (ex. 1XDay):  As Directed  Is this a 30 day or 90 day RX:  90  Preferred Pharmacy with phone number:    [facility] - [place], [place] - [address]  [address] [address]  Phone: [phone] Fax: [phone]  Local or Mail Order:  Mail  Ordering Provider:  Andrew Ibarra Call Back Number:  [phone]  Additional Information:  Please contact pt upon completion-Thank you~

## 2024-01-31 RX ORDER — DILTIAZEM HYDROCHLORIDE 360 MG/1
360 CAPSULE, EXTENDED RELEASE ORAL
Qty: 30 CAPSULE | Refills: 0 | Status: SHIPPED | OUTPATIENT
Start: 2024-01-31 | End: 2024-03-18 | Stop reason: SDUPTHER

## 2024-02-02 DIAGNOSIS — E11.9 TYPE 2 DIABETES MELLITUS WITHOUT COMPLICATION, WITHOUT LONG-TERM CURRENT USE OF INSULIN: ICD-10-CM

## 2024-02-02 RX ORDER — METFORMIN HYDROCHLORIDE 500 MG/1
500 TABLET ORAL 2 TIMES DAILY WITH MEALS
Qty: 180 TABLET | Refills: 0 | Status: SHIPPED | OUTPATIENT
Start: 2024-02-02 | End: 2024-04-26 | Stop reason: SDUPTHER

## 2024-02-02 RX ORDER — DULAGLUTIDE 1.5 MG/.5ML
1.5 INJECTION, SOLUTION SUBCUTANEOUS
Qty: 12 PEN | Refills: 0 | Status: SHIPPED | OUTPATIENT
Start: 2024-02-02 | End: 2024-04-26

## 2024-02-15 ENCOUNTER — HOSPITAL ENCOUNTER (EMERGENCY)
Facility: HOSPITAL | Age: 69
Discharge: HOME | End: 2024-02-16
Attending: EMERGENCY MEDICINE
Payer: MEDICARE

## 2024-02-15 DIAGNOSIS — R23.0 CYANOSIS OF SKIN: Primary | ICD-10-CM

## 2024-02-15 LAB
ALBUMIN SERPL BCP-MCNC: 3.9 G/DL (ref 3.5–5.2)
ALP SERPL-CCNC: 71 U/L (ref 55–135)
ALT SERPL W/O P-5'-P-CCNC: 14 U/L (ref 10–44)
ANION GAP SERPL CALC-SCNC: 7 MMOL/L (ref 8–16)
AST SERPL-CCNC: 15 U/L (ref 10–40)
BASOPHILS # BLD AUTO: 0.06 K/UL (ref 0–0.2)
BASOPHILS NFR BLD: 0.7 % (ref 0–1.9)
BILIRUB SERPL-MCNC: 0.4 MG/DL (ref 0.1–1)
BNP SERPL-MCNC: 129 PG/ML (ref 0–99)
BUN SERPL-MCNC: 12 MG/DL (ref 8–23)
CALCIUM SERPL-MCNC: 9.5 MG/DL (ref 8.7–10.5)
CHLORIDE SERPL-SCNC: 102 MMOL/L (ref 95–110)
CO2 SERPL-SCNC: 31 MMOL/L (ref 23–29)
CREAT SERPL-MCNC: 0.9 MG/DL (ref 0.5–1.4)
DIFFERENTIAL METHOD BLD: ABNORMAL
EOSINOPHIL # BLD AUTO: 0.2 K/UL (ref 0–0.5)
EOSINOPHIL NFR BLD: 2.7 % (ref 0–8)
ERYTHROCYTE [DISTWIDTH] IN BLOOD BY AUTOMATED COUNT: 13.3 % (ref 11.5–14.5)
EST. GFR  (NO RACE VARIABLE): >60 ML/MIN/1.73 M^2
GLUCOSE SERPL-MCNC: 119 MG/DL (ref 70–110)
HCT VFR BLD AUTO: 49.7 % (ref 37–48.5)
HGB BLD-MCNC: 15.9 G/DL (ref 12–16)
IMM GRANULOCYTES # BLD AUTO: 0.02 K/UL (ref 0–0.04)
IMM GRANULOCYTES NFR BLD AUTO: 0.2 % (ref 0–0.5)
LYMPHOCYTES # BLD AUTO: 2.5 K/UL (ref 1–4.8)
LYMPHOCYTES NFR BLD: 29.9 % (ref 18–48)
MCH RBC QN AUTO: 30.1 PG (ref 27–31)
MCHC RBC AUTO-ENTMCNC: 32 G/DL (ref 32–36)
MCV RBC AUTO: 94 FL (ref 82–98)
MONOCYTES # BLD AUTO: 1 K/UL (ref 0.3–1)
MONOCYTES NFR BLD: 12 % (ref 4–15)
NEUTROPHILS # BLD AUTO: 4.5 K/UL (ref 1.8–7.7)
NEUTROPHILS NFR BLD: 54.5 % (ref 38–73)
NRBC BLD-RTO: 0 /100 WBC
PLATELET # BLD AUTO: 272 K/UL (ref 150–450)
PMV BLD AUTO: 11 FL (ref 9.2–12.9)
POTASSIUM SERPL-SCNC: 4.3 MMOL/L (ref 3.5–5.1)
PROT SERPL-MCNC: 6.2 G/DL (ref 6–8.4)
RBC # BLD AUTO: 5.29 M/UL (ref 4–5.4)
SODIUM SERPL-SCNC: 140 MMOL/L (ref 136–145)
TROPONIN I SERPL HS-MCNC: 6.7 PG/ML (ref 0–14.9)
WBC # BLD AUTO: 8.24 K/UL (ref 3.9–12.7)

## 2024-02-15 PROCEDURE — 80053 COMPREHEN METABOLIC PANEL: CPT | Performed by: NURSE PRACTITIONER

## 2024-02-15 PROCEDURE — 84484 ASSAY OF TROPONIN QUANT: CPT | Performed by: NURSE PRACTITIONER

## 2024-02-15 PROCEDURE — 83880 ASSAY OF NATRIURETIC PEPTIDE: CPT | Performed by: NURSE PRACTITIONER

## 2024-02-15 PROCEDURE — 99285 EMERGENCY DEPT VISIT HI MDM: CPT | Mod: 25

## 2024-02-15 PROCEDURE — 93005 ELECTROCARDIOGRAM TRACING: CPT | Performed by: INTERNAL MEDICINE

## 2024-02-15 PROCEDURE — 85025 COMPLETE CBC W/AUTO DIFF WBC: CPT | Performed by: NURSE PRACTITIONER

## 2024-02-15 PROCEDURE — 93010 ELECTROCARDIOGRAM REPORT: CPT | Mod: ,,, | Performed by: INTERNAL MEDICINE

## 2024-02-16 VITALS
DIASTOLIC BLOOD PRESSURE: 69 MMHG | OXYGEN SATURATION: 98 % | WEIGHT: 200 LBS | RESPIRATION RATE: 16 BRPM | TEMPERATURE: 98 F | SYSTOLIC BLOOD PRESSURE: 133 MMHG | HEART RATE: 105 BPM | BODY MASS INDEX: 36.58 KG/M2

## 2024-02-16 NOTE — ED PROVIDER NOTES
Encounter Date: 2/15/2024       History     Chief Complaint   Patient presents with    Shortness of Breath     Started today. States O2 sats were low at home.      Emergent evaluation of a 68-year-old female with history of tobacco use, obstructive sleep apnea, CHF, AFib, hypertension, obesity, moderate aortic stenosis type 2 diabetes who presents to the ER due to concern over cyanosis of her hands and a sat of 41% on Wednesday, 2 days ago.  She was not use home oxygen.  She reports the chronically her toes are bluish in color.    patient reports that she was not short of breath not having chest pain did not feel weak dizzy or lightheaded did not have diaphoresis nausea or vomiting but noticed that her skin on her fingertips was blue in color so she took her pulse ox was 41%.  She decided to come to the ER today due to a family member being concerned sats have been normal today and she was continued to be asymptomatic she no longer has the cyanosis of the hands        Review of patient's allergies indicates:  No Known Allergies  Past Medical History:   Diagnosis Date    Cigarette nicotine dependence     Hypertension     Obesity     Vitamin D deficiency      Past Surgical History:   Procedure Laterality Date    BREAST SURGERY      knot on right    HYSTERECTOMY      TONSILLECTOMY       Family History   Problem Relation Age of Onset    Heart disease Mother     Heart disease Father     COPD Father     Breast cancer Maternal Grandmother      Social History     Tobacco Use    Smoking status: Every Day     Current packs/day: 1.00     Average packs/day: 1 pack/day for 39.0 years (39.0 ttl pk-yrs)     Types: Cigarettes    Smokeless tobacco: Never   Substance Use Topics    Alcohol use: Not Currently    Drug use: Not Currently     Review of Systems   Constitutional:  Negative for activity change, appetite change, chills, diaphoresis, fatigue and fever.   HENT:  Negative for congestion, postnasal drip, rhinorrhea and sore  throat.    Respiratory:  Negative for cough, chest tightness, shortness of breath, wheezing and stridor.    Cardiovascular:  Negative for chest pain and palpitations.   Gastrointestinal:  Negative for abdominal pain, nausea and vomiting.   Musculoskeletal:  Negative for arthralgias, back pain, myalgias, neck pain and neck stiffness.   Skin:  Positive for color change and pallor. Negative for rash and wound.   Neurological:  Negative for dizziness, weakness, light-headedness and headaches.   Hematological:  Does not bruise/bleed easily.   Psychiatric/Behavioral:  Negative for confusion. The patient is not nervous/anxious.    All other systems reviewed and are negative.      Physical Exam     Initial Vitals [02/15/24 1925]   BP Pulse Resp Temp SpO2   123/89 88 16 98.2 °F (36.8 °C) 96 %      MAP       --         Physical Exam    Nursing note and vitals reviewed.  Constitutional: She appears well-developed and well-nourished. She is not diaphoretic. No distress.   HENT:   Head: Normocephalic and atraumatic.   Right Ear: External ear normal.   Left Ear: External ear normal.   Nose: Nose normal.   Mouth/Throat: Oropharynx is clear and moist.   Eyes: Conjunctivae and EOM are normal. Pupils are equal, round, and reactive to light.   Neck: Neck supple. No tracheal deviation present.   Normal range of motion.  Cardiovascular:  Normal rate, regular rhythm, normal heart sounds and intact distal pulses.     Exam reveals no gallop and no friction rub.       No murmur heard.  Pulmonary/Chest: Breath sounds normal. No stridor. No respiratory distress. She has no wheezes. She has no rhonchi. She has no rales. She exhibits no tenderness.   Abdominal: Abdomen is soft. Bowel sounds are normal. She exhibits no distension and no mass. There is no abdominal tenderness. There is no rebound and no guarding.   Musculoskeletal:         General: No edema. Normal range of motion.      Cervical back: Normal range of motion and neck supple.      Neurological: She is alert and oriented to person, place, and time. She has normal strength. No cranial nerve deficit or sensory deficit.   Skin: Skin is warm and dry. No rash noted. No erythema. No pallor.   Warm hands without any cyanosis less than 2nd cap refill 2+ radial pulses    Toes are cool to palpation mild cyanosis 2nd capillary refill   Psychiatric: She has a normal mood and affect. Her behavior is normal. Judgment and thought content normal.         ED Course   Procedures  Labs Reviewed   CBC W/ AUTO DIFFERENTIAL - Abnormal; Notable for the following components:       Result Value    Hematocrit 49.7 (*)     All other components within normal limits   COMPREHENSIVE METABOLIC PANEL - Abnormal; Notable for the following components:    CO2 31 (*)     Glucose 119 (*)     Anion Gap 7 (*)     All other components within normal limits   B-TYPE NATRIURETIC PEPTIDE - Abnormal; Notable for the following components:     (*)     All other components within normal limits   TROPONIN I HIGH SENSITIVITY   TROPONIN I HIGH SENSITIVITY     EKG Readings: (Independently Interpreted)   Initial Reading: No STEMI. Heart Rate: 89. Ectopy: No Ectopy. Conduction: Normal.   Atrial fibrillation       Imaging Results              X-Ray Chest AP Portable (In process)                   X-Rays:   Independently Interpreted Readings:   Chest X-Ray: Normal heart size.  No infiltrates.  No acute abnormalities.     Medications - No data to display  Medical Decision Making  Emergent evaluation of a 68-year-old female with history of tobacco use, obstructive sleep apnea, CHF, AFib, hypertension, obesity, moderate aortic stenosis type 2 diabetes who presents to the ER due to concern over cyanosis of her hands and a sat of 41% on Wednesday, 2 days ago.  She was not use home oxygen.  She reports the chronically her toes are bluish in color.    patient reports that she was not short of breath not having chest pain did not feel weak dizzy  or lightheaded did not have diaphoresis nausea or vomiting but noticed that her skin on her fingertips was blue in color so she took her pulse ox was 41%.  She decided to come to the ER today due to a family member being concerned sats have been normal today and she was continued to be asymptomatic she no longer has the cyanosis of the hands    On physical exam blood pressure 132/86 heart rate  temp 92° sats 95% on room air respirations 16 clear breath sounds bilaterally normal cardiac exam soft nontender abdomen patient was lying reclined in bed at 20° without any shortness of breath speaks in full sentences no cyanosis of hands mild cyanosis of toes with 2-3 second cap refill  MDM    Patient presents for emergent evaluation of acute concern for cyanosis of hands and oxygen saturation of 41% on home pulse oximeter 2 days ago no shortness of breath chest pain sats 95-97% today that poses a threat to life and/or bodily function.   Differential diagnosis includes but was not limited to faulty pulse ox read due to cold pale hands, Raynaud's, peripheral vascular disease, CHF COPD PE pleural effusion pericardial effusion MI.   In the ED patient found to have acute cyanosis of hands.    I ordered labs and personally reviewed them.  Labs significant for see below  I ordered X-rays and personally reviewed them and reviewed the radiologist interpretation.  Xray significant for see below.    I ordered EKG and personally reviewed it.  EKG significant for see below    Discharge MDM     Patient was managed in the ED with no medications here  The response to treatment was good.    Patient was discharged in stable condition.  Detailed return precautions discussed.  Patient was told to follow up with primary care physician or specialist based on their diagnosis  Cat Mckeon MD      Amount and/or Complexity of Data Reviewed  Labs: ordered.     Details: Normal CBC  Bicarb 31 glucose 119  Troponin 6.7    Radiology:  ordered and independent interpretation performed.                                      Clinical Impression:  Final diagnoses:  [R23.0] Cyanosis of skin (Primary)          ED Disposition Condition    Discharge Stable          ED Prescriptions    None       Follow-up Information       Follow up With Specialties Details Why Contact Info Additional Information    Jerrod Stubbs MD Family Medicine Schedule an appointment as soon as possible for a visit   [address]  [address]  [facility] [address]  [phone]       [facility] Emergency Dept Emergency Medicine Go to  If symptoms worsen [address] [address]  [phone] 1st floor             Cat Mckeon MD  02/16/24 0405

## 2024-02-16 NOTE — FIRST PROVIDER EVALUATION
Medical screening examination initiated.  I have conducted a focused provider triage encounter, findings are as follows:    Brief history of present illness:  Presents with complaint of a low oxygen saturation patient reports this O2 sat was 41.  She has not on home O2.  Patient denies chest pain she reports when she became short of breath she looked at her hands and they were purple.  Patient reports her feet already turn purple.  Patient has a history of AFib and congestive heart failure.  Patient reports that her oxygen saturation was low all day.  This was yesterday.  Her oxygen saturation today is 96%    Vitals:    02/15/24 1925   BP: 123/89   BP Location: Left arm   Patient Position: Sitting   Pulse: 88   Resp: 16   Temp: 98.2 °F (36.8 °C)   TempSrc: Oral   SpO2: 96%   Weight: 90.7 kg (200 lb)       Pertinent physical exam:  Heart rate regular lungs clear to auscultation O2 sat 96%    Brief workup plan:  Congestive heart failure workup    Preliminary workup initiated; this workup will be continued and followed by the physician or advanced practice provider that is assigned to the patient when roomed.

## 2024-02-22 ENCOUNTER — OFFICE VISIT (OUTPATIENT)
Dept: FAMILY MEDICINE | Facility: CLINIC | Age: 69
End: 2024-02-22
Payer: MEDICARE

## 2024-02-22 ENCOUNTER — TELEPHONE (OUTPATIENT)
Dept: CARDIOLOGY | Facility: CLINIC | Age: 69
End: 2024-02-22
Payer: MEDICARE

## 2024-02-22 VITALS
HEIGHT: 62 IN | DIASTOLIC BLOOD PRESSURE: 66 MMHG | SYSTOLIC BLOOD PRESSURE: 118 MMHG | RESPIRATION RATE: 18 BRPM | BODY MASS INDEX: 36.8 KG/M2 | WEIGHT: 200 LBS | TEMPERATURE: 98 F | HEART RATE: 50 BPM | OXYGEN SATURATION: 98 %

## 2024-02-22 DIAGNOSIS — G56.02 CARPAL TUNNEL SYNDROME OF LEFT WRIST: ICD-10-CM

## 2024-02-22 DIAGNOSIS — I10 ESSENTIAL HYPERTENSION: ICD-10-CM

## 2024-02-22 DIAGNOSIS — E11.9 TYPE 2 DIABETES MELLITUS WITHOUT COMPLICATION, WITHOUT LONG-TERM CURRENT USE OF INSULIN: Primary | ICD-10-CM

## 2024-02-22 DIAGNOSIS — E55.9 VITAMIN D DEFICIENCY: ICD-10-CM

## 2024-02-22 PROCEDURE — 99214 OFFICE O/P EST MOD 30 MIN: CPT | Mod: HCNC,S$GLB,, | Performed by: FAMILY MEDICINE

## 2024-02-22 PROCEDURE — 3078F DIAST BP <80 MM HG: CPT | Mod: HCNC,CPTII,S$GLB, | Performed by: FAMILY MEDICINE

## 2024-02-22 PROCEDURE — 1159F MED LIST DOCD IN RCRD: CPT | Mod: HCNC,CPTII,S$GLB, | Performed by: FAMILY MEDICINE

## 2024-02-22 PROCEDURE — 3288F FALL RISK ASSESSMENT DOCD: CPT | Mod: HCNC,CPTII,S$GLB, | Performed by: FAMILY MEDICINE

## 2024-02-22 PROCEDURE — 3008F BODY MASS INDEX DOCD: CPT | Mod: HCNC,CPTII,S$GLB, | Performed by: FAMILY MEDICINE

## 2024-02-22 PROCEDURE — 1101F PT FALLS ASSESS-DOCD LE1/YR: CPT | Mod: HCNC,CPTII,S$GLB, | Performed by: FAMILY MEDICINE

## 2024-02-22 PROCEDURE — 99999 PR PBB SHADOW E&M-EST. PATIENT-LVL V: CPT | Mod: PBBFAC,HCNC,, | Performed by: FAMILY MEDICINE

## 2024-02-22 PROCEDURE — 1125F AMNT PAIN NOTED PAIN PRSNT: CPT | Mod: HCNC,CPTII,S$GLB, | Performed by: FAMILY MEDICINE

## 2024-02-22 PROCEDURE — 3074F SYST BP LT 130 MM HG: CPT | Mod: HCNC,CPTII,S$GLB, | Performed by: FAMILY MEDICINE

## 2024-02-22 RX ORDER — METOPROLOL TARTRATE 25 MG/1
37.5 TABLET, FILM COATED ORAL 2 TIMES DAILY
COMMUNITY
Start: 2023-12-07 | End: 2024-04-09

## 2024-02-22 NOTE — TELEPHONE ENCOUNTER
----- Message from Yury Anglin sent at 2/22/2024 10:37 AM CST -----  Regarding: Pharmacy needs information  Pt came in today needing more Eliquis information for [facility]. The pharmacy is requesting more information. Paper is in provider's box. Please call pt at [phone] when complete and fax over, pt will come  the original copy.     ##Pt still has a week left of medication, then will be out of it.##

## 2024-02-22 NOTE — PROGRESS NOTES
Subjective:       Patient ID: Courtney Nino is a 68 y.o. female.    Chief Complaint: Hypertension, Diabetes, and hand tingling (Left hand/)      Patient is here with scheduled appointment for follow-up on diabetes and hypertension.  She reports that she is developed some tingling in the left hand over the last several months.  She reports involves the thumb the 4th finger but not the little finger and to a lesser degree the 2nd and 3rd fingers.  BP Readings from Last 3 Encounters:  02/22/24 : 118/66  02/16/24 : 133/69  05/17/23 : 130/76  Lab Results       Component                Value               Date                       WBC                      8.24                02/15/2024                 HGB                      15.9                02/15/2024                 HCT                      49.7 (H)            02/15/2024                 PLT                      272                 02/15/2024                 CHOL                     223 (H)             03/24/2023                 TRIG                     180 (H)             03/24/2023                 HDL                      35 (L)              03/24/2023                 ALT                      14                  02/15/2024                 AST                      15                  02/15/2024                 NA                       140                 02/15/2024                 K                        4.3                 02/15/2024                 CL                       102                 02/15/2024                 CREATININE               0.9                 02/15/2024                 BUN                      12                  02/15/2024                 CO2                      31 (H)              02/15/2024                 TSH                      3.870               10/25/2021                 INR                      1.1                 10/25/2021                 HGBA1C                   10.5 (H)            03/24/2023            Wt Readings from Last 3  Encounters:  02/22/24 : 90.7 kg (200 lb)  02/15/24 : 90.7 kg (200 lb) on Trulicity 1.5.  05/17/23 : 98 kg (216 lb)          Hypertension  This is a chronic problem. The problem is unchanged. The problem is controlled. Pertinent negatives include no blurred vision or chest pain.   Diabetes  She presents for her follow-up diabetic visit. She has type 2 diabetes mellitus. Her disease course has been stable. Pertinent negatives for diabetes include no blurred vision, no chest pain, no fatigue and no foot paresthesias. She has not had a previous visit with a dietitian. Her breakfast blood glucose range is generally  mg/dl. Her lunch blood glucose range is generally 130-140 mg/dl. Her dinner blood glucose range is generally 130-140 mg/dl. An ACE inhibitor/angiotensin II receptor blocker is being taken. Eye exam is current.       Allergies and Medications:   Review of patient's allergies indicates:  No Known Allergies  Current Outpatient Medications   Medication Sig Dispense Refill    apixaban (ELIQUIS) 5 mg Tab Take 1 tablet (5 mg total) by mouth 2 (two) times daily. 180 tablet 1    aspirin 325 MG tablet Take 325 mg by mouth once daily.      calcium carbonate-vitamin D3 (LIQUID CALCIUM WITH VITAMIN D) 600 mg-5 mcg (200 unit) Cap Take 1 capsule by mouth 2 (two) times daily. for 365 doses 60 capsule 11    digoxin (LANOXIN) 125 mcg tablet Take 1 tablet (0.125 mg total) by mouth once daily. 90 tablet 1    diltiaZEM (CARDIZEM CD) 360 MG 24 hr capsule TAKE 1 CAPSULE ONE TIME DAILY 30 capsule 0    dulaglutide (TRULICITY) 1.5 mg/0.5 mL pen injector Inject 1.5 mg into the skin every 7 days. 12 pen 0    ergocalciferol (ERGOCALCIFEROL) 50,000 unit Cap Take 1 capsule (50,000 Units total) by mouth every 7 days. 4 capsule 11    furosemide (LASIX) 40 MG tablet Take 1 tablet (40 mg total) by mouth 2 (two) times a day. 180 tablet 3    glimepiride (AMARYL) 2 MG tablet Take 1 tablet (2 mg total) by mouth before breakfast. 90 tablet 3     ibandronate (BONIVA) 150 mg tablet Take 1 tablet (150 mg total) by mouth every 30 days. 1 tablet 11    lancets (BD ULTRA FINE LANCETS) 33 gauge Misc 1 lancet  by Misc.(Non-Drug; Combo Route) route 2 (two) times daily. 200 each 3    lancets Misc To check BG 2 times daily, to use with insurance preferred meter 200 each 1    losartan (COZAAR) 50 MG tablet Take 1 tablet (50 mg total) by mouth once daily. 90 tablet 1    magnesium oxide (MAG-OX) 400 mg (241.3 mg magnesium) tablet Take 1 tablet (400 mg total) by mouth 2 (two) times daily. 90 tablet 3    metFORMIN (GLUCOPHAGE) 500 MG tablet Take 1 tablet (500 mg total) by mouth 2 (two) times daily with meals. 180 tablet 0    metoprolol succinate (TOPROL-XL) 100 MG 24 hr tablet Take 1 tablet (100 mg total) by mouth once daily. 30 tablet 11    potassium chloride SA (K-DUR,KLOR-CON) 20 MEQ tablet Take 20 mEq by mouth 2 (two) times daily.      pravastatin (PRAVACHOL) 40 MG tablet Take 1 tablet (40 mg total) by mouth once daily. 90 tablet 3    alcohol swabs (BD ALCOHOL SWABS) PadM Apply 1 each topically as needed. 200 each 3    blood sugar diagnostic Strp To check BG 2 times daily, to use with insurance preferred meter 200 strip 3    blood-glucose meter kit To check BG 2 times daily, to use with insurance preferred meter 1 each 0    metoprolol tartrate (LOPRESSOR) 25 MG tablet Take 37.5 mg by mouth 2 (two) times daily.      omega-3 fatty acids/fish oil (FISH OIL-OMEGA-3 FATTY ACIDS) 300-1,000 mg capsule Take 1 capsule by mouth once daily.      vitamin E 1000 UNIT capsule Take 1,000 Units by mouth once daily.       No current facility-administered medications for this visit.       Family History:   Family History   Problem Relation Age of Onset    Heart disease Mother     Heart disease Father     COPD Father     Breast cancer Maternal Grandmother        Social History:   Social History     Socioeconomic History    Marital status:    Tobacco Use    Smoking status: Every  Day     Current packs/day: 1.00     Average packs/day: 1 pack/day for 39.0 years (39.0 ttl pk-yrs)     Types: Cigarettes    Smokeless tobacco: Never   Substance and Sexual Activity    Alcohol use: Not Currently    Drug use: Not Currently     Social Determinants of Health     Physical Activity: Inactive (2/11/2021)    Exercise Vital Sign     Days of Exercise per Week: 0 days     Minutes of Exercise per Session: 0 min   Stress: No Stress Concern Present (2/11/2021)    [employer] - Occupational Stress Questionnaire     Feeling of Stress : Not at all       Review of Systems   Constitutional:  Negative for fatigue.   Eyes:  Negative for blurred vision.   Cardiovascular:  Negative for chest pain.       Objective:     Vitals:    02/22/24 0934   BP: 118/66   Pulse: (!) 50   Resp: 18   Temp: 97.7 °F (36.5 °C)        Physical Exam  Vitals and nursing note reviewed.   Constitutional:       General: She is not in acute distress.     Appearance: Normal appearance. She is well-developed and normal weight. She is not ill-appearing, toxic-appearing or diaphoretic.   HENT:      Head: Normocephalic and atraumatic.   Eyes:      Pupils: Pupils are equal, round, and reactive to light.   Cardiovascular:      Rate and Rhythm: Normal rate and regular rhythm.      Heart sounds: Normal heart sounds. No murmur heard.     No friction rub. No gallop.   Pulmonary:      Effort: Pulmonary effort is normal. No respiratory distress.      Breath sounds: Normal breath sounds. No stridor. No wheezing, rhonchi or rales.   Chest:      Chest wall: No tenderness.   Musculoskeletal:        Hands:       Right lower leg: No edema.      Left lower leg: No edema.   Neurological:      Mental Status: She is alert.   Psychiatric:         Behavior: Behavior normal.         Thought Content: Thought content normal.         Judgment: Judgment normal.         Assessment:       1. Type 2 diabetes mellitus without complication, without  long-term current use of insulin    2. Essential hypertension    3. Carpal tunnel syndrome of left wrist    4. Vitamin D deficiency        Plan:       Courtney was seen today for hypertension, diabetes and hand tingling.    Diagnoses and all orders for this visit:    Type 2 diabetes mellitus without complication, without long-term current use of insulin  -     Lipid Panel; Future  -     Microalbumin/Creatinine Ratio, Urine; Future  -     Comprehensive Metabolic Panel; Future  -     Hemoglobin A1C; Future    Essential hypertension    Carpal tunnel syndrome of left wrist  -     Vitamin B1; Future    Vitamin D deficiency  -     Vitamin D; Future         No follow-ups on file.

## 2024-02-23 DIAGNOSIS — I48.91 ATRIAL FIBRILLATION BY ELECTROCARDIOGRAM: ICD-10-CM

## 2024-02-27 DIAGNOSIS — Z00.00 ENCOUNTER FOR MEDICARE ANNUAL WELLNESS EXAM: ICD-10-CM

## 2024-02-28 LAB
OHS QRS DURATION: 82 MS
OHS QTC CALCULATION: 464 MS

## 2024-03-12 ENCOUNTER — PATIENT MESSAGE (OUTPATIENT)
Dept: ADMINISTRATIVE | Facility: HOSPITAL | Age: 69
End: 2024-03-12
Payer: MEDICARE

## 2024-03-18 DIAGNOSIS — I50.9 CONGESTIVE HEART FAILURE, UNSPECIFIED HF CHRONICITY, UNSPECIFIED HEART FAILURE TYPE: ICD-10-CM

## 2024-03-18 DIAGNOSIS — I48.91 ATRIAL FIBRILLATION WITH RVR: ICD-10-CM

## 2024-03-18 NOTE — TELEPHONE ENCOUNTER
----- Message from Amnada Torres sent at 3/18/2024 10:18 AM CDT -----  Type:  RX Refill Request    Who Called:  pt  Refill or New Rx:  refill  RX Name and Strength:  diltiaZEM (CARDIZEM CD) 360 MG 24 hr capsule  How is the patient currently taking it? (ex. 1XDay):  as directed  Is this a 30 day or 90 day RX:  90  Preferred Pharmacy with phone number:    [facility] - [place], [place] - [address]  [address] [address]  Phone: [phone] Fax: [phone]  Local or Mail Order:  mail  Ordering Provider:  Geovanna Ibarra Call Back Number:  [phone] (home)     Additional Information:  thank you

## 2024-03-20 RX ORDER — DILTIAZEM HYDROCHLORIDE 360 MG/1
360 CAPSULE, EXTENDED RELEASE ORAL DAILY
Qty: 30 CAPSULE | Refills: 0 | Status: SHIPPED | OUTPATIENT
Start: 2024-03-20 | End: 2024-04-09 | Stop reason: SDUPTHER

## 2024-03-27 DIAGNOSIS — E78.2 MIXED HYPERLIPIDEMIA: ICD-10-CM

## 2024-03-27 RX ORDER — PRAVASTATIN SODIUM 40 MG/1
40 TABLET ORAL DAILY
Qty: 90 TABLET | Refills: 1 | Status: SHIPPED | OUTPATIENT
Start: 2024-03-27 | End: 2025-03-27

## 2024-03-28 ENCOUNTER — PATIENT MESSAGE (OUTPATIENT)
Dept: ADMINISTRATIVE | Facility: HOSPITAL | Age: 69
End: 2024-03-28
Payer: MEDICARE

## 2024-03-28 DIAGNOSIS — M81.0 OSTEOPOROSIS, UNSPECIFIED OSTEOPOROSIS TYPE, UNSPECIFIED PATHOLOGICAL FRACTURE PRESENCE: ICD-10-CM

## 2024-03-28 RX ORDER — IBANDRONATE SODIUM 150 MG/1
150 TABLET, FILM COATED ORAL
Qty: 1 TABLET | Refills: 11 | Status: SHIPPED | OUTPATIENT
Start: 2024-03-28 | End: 2025-03-28

## 2024-04-09 ENCOUNTER — OFFICE VISIT (OUTPATIENT)
Dept: CARDIOLOGY | Facility: CLINIC | Age: 69
End: 2024-04-09
Payer: MEDICARE

## 2024-04-09 VITALS
WEIGHT: 197.81 LBS | DIASTOLIC BLOOD PRESSURE: 83 MMHG | OXYGEN SATURATION: 98 % | BODY MASS INDEX: 36.4 KG/M2 | HEART RATE: 70 BPM | SYSTOLIC BLOOD PRESSURE: 119 MMHG | HEIGHT: 62 IN

## 2024-04-09 DIAGNOSIS — I50.9 CONGESTIVE HEART FAILURE, UNSPECIFIED HF CHRONICITY, UNSPECIFIED HEART FAILURE TYPE: ICD-10-CM

## 2024-04-09 DIAGNOSIS — I48.91 ATRIAL FIBRILLATION WITH RVR: ICD-10-CM

## 2024-04-09 DIAGNOSIS — I73.9 PAD (PERIPHERAL ARTERY DISEASE): Primary | ICD-10-CM

## 2024-04-09 PROCEDURE — 4010F ACE/ARB THERAPY RXD/TAKEN: CPT | Mod: CPTII,S$GLB,, | Performed by: NURSE PRACTITIONER

## 2024-04-09 PROCEDURE — 99214 OFFICE O/P EST MOD 30 MIN: CPT | Mod: S$GLB,,, | Performed by: NURSE PRACTITIONER

## 2024-04-09 PROCEDURE — 99999 PR PBB SHADOW E&M-EST. PATIENT-LVL IV: CPT | Mod: PBBFAC,,, | Performed by: NURSE PRACTITIONER

## 2024-04-09 PROCEDURE — 3079F DIAST BP 80-89 MM HG: CPT | Mod: CPTII,S$GLB,, | Performed by: NURSE PRACTITIONER

## 2024-04-09 PROCEDURE — 1101F PT FALLS ASSESS-DOCD LE1/YR: CPT | Mod: CPTII,S$GLB,, | Performed by: NURSE PRACTITIONER

## 2024-04-09 PROCEDURE — 3008F BODY MASS INDEX DOCD: CPT | Mod: CPTII,S$GLB,, | Performed by: NURSE PRACTITIONER

## 2024-04-09 PROCEDURE — 3288F FALL RISK ASSESSMENT DOCD: CPT | Mod: CPTII,S$GLB,, | Performed by: NURSE PRACTITIONER

## 2024-04-09 PROCEDURE — 1126F AMNT PAIN NOTED NONE PRSNT: CPT | Mod: CPTII,S$GLB,, | Performed by: NURSE PRACTITIONER

## 2024-04-09 PROCEDURE — 1159F MED LIST DOCD IN RCRD: CPT | Mod: CPTII,S$GLB,, | Performed by: NURSE PRACTITIONER

## 2024-04-09 PROCEDURE — 3074F SYST BP LT 130 MM HG: CPT | Mod: CPTII,S$GLB,, | Performed by: NURSE PRACTITIONER

## 2024-04-09 RX ORDER — DILTIAZEM HYDROCHLORIDE 360 MG/1
360 CAPSULE, EXTENDED RELEASE ORAL DAILY
Qty: 90 CAPSULE | Refills: 11 | Status: SHIPPED | OUTPATIENT
Start: 2024-04-09

## 2024-04-09 RX ORDER — METOPROLOL SUCCINATE 50 MG/1
50 TABLET, EXTENDED RELEASE ORAL DAILY
Qty: 90 TABLET | Refills: 3 | Status: SHIPPED | OUTPATIENT
Start: 2024-04-09 | End: 2025-04-09

## 2024-04-09 NOTE — PROGRESS NOTES
Subjective:    Patient ID:  Courtney Nino is a 69 y.o. female who presents for follow-up   Chief Complaint   Patient presents with    Follow-up      both foot digits are light purple)       HPI:      Courtney Nino is here for follow-up visit. Denies chest pain or shortness of breath. Denies recent fever cough chills or congestion. Denies blood in the urine or blood in the stool. Has discoloration to feet.   Denies myalgias. Denies orthopnea or peripheral edema. Denies nausea vomiting or dyspepsia. No recent arm neck or jaw pain.             Review of patient's allergies indicates:  No Known Allergies    Past Medical History:   Diagnosis Date    Cigarette nicotine dependence     Hypertension     Obesity     Vitamin D deficiency      Past Surgical History:   Procedure Laterality Date    BREAST SURGERY      knot on right    HYSTERECTOMY      TONSILLECTOMY       Social History     Tobacco Use    Smoking status: Every Day     Current packs/day: 1.00     Average packs/day: 1 pack/day for 39.0 years (39.0 ttl pk-yrs)     Types: Cigarettes    Smokeless tobacco: Never   Substance Use Topics    Alcohol use: Not Currently    Drug use: Not Currently     Family History   Problem Relation Age of Onset    Heart disease Mother     Heart disease Father     COPD Father     Breast cancer Maternal Grandmother         Review of Systems:            Objective:        Vitals:    04/09/24 1525   BP: 119/83   Pulse: 70       Lab Results   Component Value Date    WBC 8.24 02/15/2024    HGB 15.9 02/15/2024    HCT 49.7 (H) 02/15/2024     02/15/2024    CHOL 223 (H) 03/24/2023    TRIG 180 (H) 03/24/2023    HDL 35 (L) 03/24/2023    ALT 14 02/15/2024    AST 15 02/15/2024     02/15/2024    K 4.3 02/15/2024     02/15/2024    CREATININE 0.9 02/15/2024    BUN 12 02/15/2024    CO2 31 (H) 02/15/2024    TSH 3.870 10/25/2021    INR 1.1 10/25/2021    HGBA1C 10.5 (H) 03/24/2023        ECHOCARDIOGRAM RESULTS  Results for orders placed  during the hospital encounter of 10/25/21    Transesophageal echo (EAMON) with possible cardioversion    Interpretation Summary  · The left ventricle is mildly enlarged with concentric remodeling and moderately decreased systolic function.  · Left ventricular diastolic dysfunction.  · There is severe left ventricular global hypokinesis.  · Mild right ventricular enlargement with normal right ventricular systolic function.  · Severe left atrial enlargement.  · Moderate right atrial enlargement.  · Moderate mitral regurgitation.  · Moderate tricuspid regurgitation.  · No interatrial septal defect present.  · Normal appearing left atrial appendage. No thrombus is present in the appendage. JACI occluder is absent. Abnormal appendage velocities.  · The estimated ejection fraction is 30%.  · A 150 J synchronized cardioversion was unsuccessful without restoration of normal sinus rhythm.  · A 200 J synchronized cardioversion was unsuccessful without restoration of normal sinus rhythm.  · A 200 J restored sinus rhythm with early recurrence of atrial fibrillation (ERAF).        CURRENT/PREVIOUS VISIT EKG  Results for orders placed or performed during the hospital encounter of 02/15/24   EKG 12-lead    Collection Time: 02/15/24  8:31 PM   Result Value Ref Range    QRS Duration 82 ms    [facility] QTC Calculation 464 ms    Narrative    Test Reason : R06.02,    Vent. Rate : 089 BPM     Atrial Rate : 416 BPM     P-R Int : 000 ms          QRS Dur : 082 ms      QT Int : 382 ms       P-R-T Axes : 000 024 023 degrees     QTc Int : 464 ms    Atrial fibrillation  Low voltage QRS  Septal infarct (cited on or before 25-OCT-2021)  Abnormal ECG  When compared with ECG of 12-FEB-2023 15:11,  Questionable change in initial forces of Septal leads  Nonspecific T wave abnormality no longer evident in Anterior-lateral leads  Confirmed by Charly Cabrera MD ([id number]) on 2/28/2024 12:33:49 PM    Referred By: [email or web address]   SELF           Confirmed By:Charly Cabrera  MD         Physical Exam:  CONSTITUTIONAL: No fever, no chills  HEENT: Normocephalic, atraumatic,pupils reactive to light                 NECK:  No JVD no carotid bruit  CVS: S1S2+, RRR, no murmurs,   LUNGS: Clear  ABDOMEN: Soft, NT, BS+  EXTREMITIES: No cyanosis, edema  : No nazario catheter  NEURO: AAO X 3  PSY: Normal affect      Medication List with Changes/Refills   New Medications    NITROGLYCERIN (NITROGLYN) 0.2 % OINTMENT    Place 1 Package (30,000 mg total) onto the skin every evening. Apply to toes every night   Current Medications    ALCOHOL SWABS (BD ALCOHOL SWABS) PADM    Apply 1 each topically as needed.    APIXABAN (ELIQUIS) 5 MG TAB    Take 1 tablet (5 mg total) by mouth 2 (two) times daily.    ASPIRIN 325 MG TABLET    Take 325 mg by mouth once daily.    BLOOD SUGAR DIAGNOSTIC STRP    To check BG 2 times daily, to use with insurance preferred meter    BLOOD-GLUCOSE METER KIT    To check BG 2 times daily, to use with insurance preferred meter    CALCIUM CARBONATE-VITAMIN D3 (LIQUID CALCIUM WITH VITAMIN D) 600 MG-5 MCG (200 UNIT) CAP    Take 1 capsule by mouth 2 (two) times daily. for 365 doses    DIGOXIN (LANOXIN) 125 MCG TABLET    Take 1 tablet (0.125 mg total) by mouth once daily.    DULAGLUTIDE (TRULICITY) 1.5 MG/0.5 ML PEN INJECTOR    Inject 1.5 mg into the skin every 7 days.    ERGOCALCIFEROL (ERGOCALCIFEROL) 50,000 UNIT CAP    Take 1 capsule (50,000 Units total) by mouth every 7 days.    FUROSEMIDE (LASIX) 40 MG TABLET    Take 1 tablet (40 mg total) by mouth 2 (two) times a day.    GLIMEPIRIDE (AMARYL) 2 MG TABLET    Take 1 tablet (2 mg total) by mouth before breakfast.    IBANDRONATE (BONIVA) 150 MG TABLET    Take 1 tablet (150 mg total) by mouth every 30 days.    LANCETS (BD ULTRA FINE LANCETS) 33 GAUGE MISC    1 lancet  by Misc.(Non-Drug; Combo Route) route 2 (two) times daily.    LANCETS MISC    To check BG 2 times daily, to use with insurance preferred meter    LOSARTAN (COZAAR) 50 MG  TABLET    Take 1 tablet (50 mg total) by mouth once daily.    MAGNESIUM OXIDE (MAG-OX) 400 MG (241.3 MG MAGNESIUM) TABLET    Take 1 tablet (400 mg total) by mouth 2 (two) times daily.    METFORMIN (GLUCOPHAGE) 500 MG TABLET    Take 1 tablet (500 mg total) by mouth 2 (two) times daily with meals.    OMEGA-3 FATTY ACIDS/FISH OIL (FISH OIL-OMEGA-3 FATTY ACIDS) 300-1,000 MG CAPSULE    Take 1 capsule by mouth once daily.    POTASSIUM CHLORIDE SA (K-DUR,KLOR-CON) 20 MEQ TABLET    Take 20 mEq by mouth 2 (two) times daily.    PRAVASTATIN (PRAVACHOL) 40 MG TABLET    Take 1 tablet (40 mg total) by mouth once daily.    VITAMIN E 1000 UNIT CAPSULE    Take 1,000 Units by mouth once daily.   Changed and/or Refilled Medications    Modified Medication Previous Medication    DILTIAZEM (CARDIZEM CD) 360 MG 24 HR CAPSULE diltiaZEM (CARDIZEM CD) 360 MG 24 hr capsule       Take 1 capsule (360 mg total) by mouth once daily.    Take 1 capsule (360 mg total) by mouth once daily.    METOPROLOL SUCCINATE (TOPROL-XL) 50 MG 24 HR TABLET metoprolol succinate (TOPROL-XL) 100 MG 24 hr tablet       Take 1 tablet (50 mg total) by mouth once daily.    Take 1 tablet (100 mg total) by mouth once daily.   Discontinued Medications    METOPROLOL TARTRATE (LOPRESSOR) 25 MG TABLET    Take 37.5 mg by mouth 2 (two) times daily.             Assessment:       1. PAD (peripheral artery disease)    2. Congestive heart failure, unspecified HF chronicity, unspecified heart failure type    3. Atrial fibrillation with RVR        Discoloration to toes  Decrease metoprolol to half dose  Add nitro paste to toes every night           Plan:     Problem List Items Addressed This Visit          Cardiac/Vascular    Atrial fibrillation with RVR    Relevant Medications    diltiaZEM (CARDIZEM CD) 360 MG 24 hr capsule    metoprolol succinate (TOPROL-XL) 50 MG 24 hr tablet    Other Relevant Orders    Echo    CHF (congestive heart failure)    Relevant Medications    diltiaZEM  (CARDIZEM CD) 360 MG 24 hr capsule    Other Relevant Orders    CPAP/BIPAP SUPPLIES    Echo    PAD (peripheral artery disease) - Primary       No follow-ups on file.       BLAKE Castro-ACNP, CVNP-BC

## 2024-04-19 ENCOUNTER — PATIENT MESSAGE (OUTPATIENT)
Dept: ADMINISTRATIVE | Facility: HOSPITAL | Age: 69
End: 2024-04-19
Payer: MEDICARE

## 2024-04-22 NOTE — TELEPHONE ENCOUNTER
----- Message from Charmaine Nunes sent at 4/22/2024 11:49 AM CDT -----  Type: Needs Medical Advice  Who Called:  patient   Pharmacy name and phone #:    [facility] - [place] - [address]  [address] [place] [address]  Phone: [phone] Fax: [phone]  Best Call Back Number: [phone]  Additional Information: patient was seen recently- was supposed to have a prescription sent in for her feet and she never received

## 2024-04-24 NOTE — TELEPHONE ENCOUNTER
----- Message from Amanda Torres sent at 4/24/2024  3:05 PM CDT -----  Type: Needs Medical Advice  Who Called:  pt  Symptoms (please be specific):  pt said she need to speak to the nurse--said she just got off the phone w/ [facility] and they said they hahve not received a rx for her nitroglycerin (NITROGLYN) 0.2 % ointment--please call and advise  [facility] [place] - [address] [facility]  [address] [place] [address]  Phone: [phone] Fax: [phone]      Best Call Back Number: There are no phone numbers on file.    Additional Information: thank you

## 2024-04-26 DIAGNOSIS — E11.9 TYPE 2 DIABETES MELLITUS WITHOUT COMPLICATION, WITHOUT LONG-TERM CURRENT USE OF INSULIN: ICD-10-CM

## 2024-04-26 RX ORDER — METFORMIN HYDROCHLORIDE 500 MG/1
500 TABLET ORAL 2 TIMES DAILY WITH MEALS
Qty: 180 TABLET | Refills: 1 | Status: SHIPPED | OUTPATIENT
Start: 2024-04-26

## 2024-04-30 ENCOUNTER — TELEPHONE (OUTPATIENT)
Dept: CARDIOLOGY | Facility: CLINIC | Age: 69
End: 2024-04-30
Payer: MEDICARE

## 2024-04-30 NOTE — TELEPHONE ENCOUNTER
----- Message from Jalil Melendez sent at 4/30/2024 12:21 PM CDT -----  Regarding: medication  Type:  Patient Returning Call    Who Called:patient  Who Left Message for Patient:office nurse  Does the patient know what this is regarding?:medication/ cream for feet not called in  Would the patient rather a call back or a response via [facility]? 2nd call to call center  Best Call Back Number:[phone]  Additional Information:

## 2024-05-03 ENCOUNTER — TELEPHONE (OUTPATIENT)
Dept: CARDIOLOGY | Facility: CLINIC | Age: 69
End: 2024-05-03
Payer: MEDICARE

## 2024-05-03 NOTE — TELEPHONE ENCOUNTER
----- Message from Jalil Melendez sent at 5/3/2024 10:38 AM CDT -----  Regarding: refill  Contact: patient  Type:  RX Refill Request    Who Called: patient  Refill or New Rx:Refill  RX Name and Strength:nitroglycerin (NITROGLYN) 0.2 % ointment  How is the patient currently taking it? (ex. 1XDay):  Is this a 30 day or 90 day RX:  Preferred Pharmacy with phone number:  [facility] [place] - [address] [address]  [address] [facility]  [place] [address]  Phone: [phone] Fax: [phone]  Local or Mail Order:local  Ordering Provider:Andrew  Would the patient rather a call back or a response via [facility]? Patient states she has requested this medication since the 9th of last month/ Please let her know if MD will not refill  Best Call Back Number:[phone]  Additional Information:

## 2024-05-03 NOTE — TELEPHONE ENCOUNTER
----- Message from Gloria Watson sent at 5/3/2024  4:27 PM CDT -----  Type:  Needs Medical Advice    [facility]   Best Call Back Number: [phone]   Additional Information:  Requesting call back  wants to verify rx nitroglycerin (NITROGLYN) 0.2 % ointment    please advise thank you

## 2024-05-03 NOTE — TELEPHONE ENCOUNTER
Sw the patient about her request for the refill and informed her that the script was called in on 4/25/24 I also call the pharmacy  and ask for the status on it and a verbal was needed due to the an outage she stated   then return a call to let patient know that it was being work on and will they will notify her when it ready for

## 2024-05-06 ENCOUNTER — TELEPHONE (OUTPATIENT)
Dept: CARDIOLOGY | Facility: CLINIC | Age: 69
End: 2024-05-06
Payer: MEDICARE

## 2024-05-09 ENCOUNTER — TELEPHONE (OUTPATIENT)
Dept: CARDIOLOGY | Facility: CLINIC | Age: 69
End: 2024-05-09
Payer: MEDICARE

## 2024-05-16 ENCOUNTER — TELEPHONE (OUTPATIENT)
Dept: CARDIOLOGY | Facility: CLINIC | Age: 69
End: 2024-05-16
Payer: MEDICARE

## 2024-05-16 NOTE — TELEPHONE ENCOUNTER
I Sw with he patient 's insurance company ( [employer]) about PA for the Rx Nitroglycrin 0.2 ointment  rep stated a decision will be made within 24 hrs

## 2024-05-16 NOTE — TELEPHONE ENCOUNTER
----- Message from Amanda Torres sent at 5/16/2024  3:13 PM CDT -----  Type: Needs Medical Advice  Who Called:  pt  Symptoms (please be specific):  pt said she need to speak to the office--said she been trying to get her meds filled since 4/9 and she said they do not have the same rx as the nitroglycerin (NITROGLYN) 0.2 % ointment buth they have something similar but the dr have to write the rx for that med--please call and advise  Pharmacy name and phone #:    [facility] [place] - [address] [facility]  [address] [place] [address]  Phone: [phone] Fax: [phone]  Best Call Back Number: [phone]    Additional Information: thank you

## 2024-05-16 NOTE — TELEPHONE ENCOUNTER
----- Message from Gloria Watson sent at 5/16/2024 11:25 AM CDT -----  Type:  Needs Medical Advice    Who Called: [facility]   Best Call Back Number: [phone]   Additional Information:  Requesting call back  checking on prior auth status     please advise thank you

## 2024-05-16 NOTE — TELEPHONE ENCOUNTER
Called the patient to informed her about the Pa is still pending and will have a decision within 48hrs  the patient understood the stated

## 2024-06-05 DIAGNOSIS — E78.2 MIXED HYPERLIPIDEMIA: ICD-10-CM

## 2024-06-05 RX ORDER — LOSARTAN POTASSIUM 50 MG/1
50 TABLET ORAL DAILY
Qty: 90 TABLET | Refills: 1 | Status: SHIPPED | OUTPATIENT
Start: 2024-06-05

## 2024-06-12 ENCOUNTER — TELEPHONE (OUTPATIENT)
Dept: FAMILY MEDICINE | Facility: CLINIC | Age: 69
End: 2024-06-12
Payer: MEDICARE

## 2024-06-12 DIAGNOSIS — E11.9 TYPE 2 DIABETES MELLITUS WITHOUT COMPLICATION, WITHOUT LONG-TERM CURRENT USE OF INSULIN: Primary | ICD-10-CM

## 2024-06-12 DIAGNOSIS — M81.0 OSTEOPOROSIS, UNSPECIFIED OSTEOPOROSIS TYPE, UNSPECIFIED PATHOLOGICAL FRACTURE PRESENCE: ICD-10-CM

## 2024-06-12 NOTE — TELEPHONE ENCOUNTER
----- Message from Magnolia Poole sent at 6/12/2024 11:18 AM CDT -----  Regarding: RX Refill Request  Contact: patient at [phone]  Type:  RX Refill Request    Who Called:  patient at [phone]    Preferred Pharmacy with phone number:    [facility] - [place], [place] - [address]  [address] [facility] [address]  Phone: [phone] Fax: [phone]    Additional Information:  calling to get refill of Trulicity insulin pen and Vitamin D. Please call and advise. Thank you    calcium carbonate-vitamin D3 (LIQUID CALCIUM WITH VITAMIN D) 600 mg-5 mcg (200 unit) Cap 60 capsule 11 5/24/2023 4/9/2024   Sig - Route: Take 1 capsule by mouth 2 (two) times daily. for 365 doses - Oral   Sent to pharmacy as: calcium carbonate-vitamin D3 (LIQUID CALCIUM WITH VITAMIN D) 600 mg-5 mcg (200 unit) Cap   E-Prescribing Status: Receipt confirmed by pharmacy (5/24/2023 12:41 PM CDT)

## 2024-06-13 RX ORDER — BUTALB/ACETAMINOPHEN/CAFFEINE 50-325-40
1 TABLET ORAL 2 TIMES DAILY
Qty: 180 CAPSULE | Refills: 3 | Status: SHIPPED | OUTPATIENT
Start: 2024-06-13 | End: 2024-12-13

## 2024-06-13 RX ORDER — DULAGLUTIDE 3 MG/.5ML
3 INJECTION, SOLUTION SUBCUTANEOUS
Qty: 12 PEN | Refills: 3 | Status: SHIPPED | OUTPATIENT
Start: 2024-06-13 | End: 2025-06-13

## 2024-06-13 NOTE — TELEPHONE ENCOUNTER
----- Message from Magnolia Poole sent at 6/12/2024 11:18 AM CDT -----  Regarding: RX Refill Request  Contact: patient at [phone]  Type:  RX Refill Request    Who Called:  patient at [phone]    Preferred Pharmacy with phone number:    [facility] - [place], [place] - [address]  [address] [facility] [address]  Phone: [phone] Fax: [phone]    Additional Information:  calling to get refill of Trulicity insulin pen and Vitamin D. Please call and advise. Thank you    calcium carbonate-vitamin D3 (LIQUID CALCIUM WITH VITAMIN D) 600 mg-5 mcg (200 unit) Cap 60 capsule 11 5/24/2023 4/9/2024   Sig - Route: Take 1 capsule by mouth 2 (two) times daily. for 365 doses - Oral   Sent to pharmacy as: calcium carbonate-vitamin D3 (LIQUID CALCIUM WITH VITAMIN D) 600 mg-5 mcg (200 unit) Cap   E-Prescribing Status: Receipt confirmed by pharmacy (5/24/2023 12:41 PM CDT)

## 2024-06-21 ENCOUNTER — TELEPHONE (OUTPATIENT)
Dept: FAMILY MEDICINE | Facility: CLINIC | Age: 69
End: 2024-06-21
Payer: MEDICARE

## 2024-06-27 ENCOUNTER — TELEPHONE (OUTPATIENT)
Dept: FAMILY MEDICINE | Facility: CLINIC | Age: 69
End: 2024-06-27
Payer: MEDICARE

## 2024-07-02 ENCOUNTER — HOSPITAL ENCOUNTER (OUTPATIENT)
Dept: CARDIOLOGY | Facility: HOSPITAL | Age: 69
Discharge: HOME | End: 2024-07-02
Attending: NURSE PRACTITIONER
Payer: MEDICARE

## 2024-07-02 VITALS — HEIGHT: 62 IN | WEIGHT: 197.75 LBS | BODY MASS INDEX: 36.39 KG/M2

## 2024-07-02 DIAGNOSIS — I48.91 ATRIAL FIBRILLATION WITH RVR: ICD-10-CM

## 2024-07-02 DIAGNOSIS — I50.9 CONGESTIVE HEART FAILURE, UNSPECIFIED HF CHRONICITY, UNSPECIFIED HEART FAILURE TYPE: ICD-10-CM

## 2024-07-02 LAB
AORTIC ROOT ANNULUS: 3.1 CM
AORTIC VALVE CUSP SEPERATION: 1.5 CM
AV INDEX (PROSTH): 0.5
AV MEAN GRADIENT: 10 MMHG
AV PEAK GRADIENT: 17 MMHG
AV VALVE AREA BY VELOCITY RATIO: 2.1 CM²
AV VALVE AREA: 2.27 CM²
AV VELOCITY RATIO: 0.46
BSA FOR ECHO PROCEDURE: 1.98 M2
CV ECHO LV RWT: 0.65 CM
DOP CALC AO PEAK VEL: 2.07 M/S
DOP CALC AO VTI: 40 CM
DOP CALC LVOT AREA: 4.5 CM2
DOP CALC LVOT DIAMETER: 2.4 CM
DOP CALC LVOT PEAK VEL: 0.96 M/S
DOP CALC LVOT STROKE VOLUME: 90.88 CM3
DOP CALC MV VTI: 33.1 CM
DOP CALCLVOT PEAK VEL VTI: 20.1 CM
E WAVE DECELERATION TIME: 227 MSEC
E/E' RATIO: 15.6 M/S
ECHO LV POSTERIOR WALL: 1.3 CM (ref 0.6–1.1)
FRACTIONAL SHORTENING: 25 % (ref 28–44)
INTERVENTRICULAR SEPTUM: 1.3 CM (ref 0.6–1.1)
IVC DIAMETER: 1.8 CM
IVRT: 70 MSEC
LEFT ATRIUM AREA SYSTOLIC (APICAL 2 CHAMBER): 28.6 CM2
LEFT ATRIUM AREA SYSTOLIC (APICAL 4 CHAMBER): 32.2 CM2
LEFT ATRIUM SIZE: 4.6 CM
LEFT ATRIUM VOLUME INDEX MOD: 54.7 ML/M2
LEFT ATRIUM VOLUME MOD: 104 CM3
LEFT INTERNAL DIMENSION IN SYSTOLE: 3 CM (ref 2.1–4)
LEFT VENTRICLE DIASTOLIC VOLUME INDEX: 36.84 ML/M2
LEFT VENTRICLE DIASTOLIC VOLUME: 70 ML
LEFT VENTRICLE END SYSTOLIC VOLUME APICAL 2 CHAMBER: 94.6 ML
LEFT VENTRICLE END SYSTOLIC VOLUME APICAL 4 CHAMBER: 115 ML
LEFT VENTRICLE MASS INDEX: 98 G/M2
LEFT VENTRICLE SYSTOLIC VOLUME INDEX: 18.4 ML/M2
LEFT VENTRICLE SYSTOLIC VOLUME: 35 ML
LEFT VENTRICULAR INTERNAL DIMENSION IN DIASTOLE: 4 CM (ref 3.5–6)
LEFT VENTRICULAR MASS: 186.55 G
LV LATERAL E/E' RATIO: 14.18 M/S
LV SEPTAL E/E' RATIO: 17.33 M/S
LVED V (TEICH): 70 ML
LVES V (TEICH): 35 ML
LVOT MG: 2 MMHG
LVOT MV: 0.66 CM/S
MV MEAN GRADIENT: 3 MMHG
MV PEAK E VEL: 1.56 M/S
MV PEAK GRADIENT: 9 MMHG
MV VALVE AREA BY CONTINUITY EQUATION: 2.75 CM2
OHS CV RV/LV RATIO: 0.6 CM
PISA TR MAX VEL: 2.72 M/S
PV MV: 0.69 M/S
PV PEAK GRADIENT: 4 MMHG
PV PEAK VELOCITY: 0.99 M/S
RA PRESSURE ESTIMATED: 3 MMHG
RIGHT VENTRICULAR END-DIASTOLIC DIMENSION: 2.4 CM
RV TB RVSP: 6 MMHG
RV TISSUE DOPPLER FREE WALL SYSTOLIC VELOCITY 1 (APICAL 4 CHAMBER VIEW): 10.8 CM/S
TDI LATERAL: 0.11 M/S
TDI SEPTAL: 0.09 M/S
TDI: 0.1 M/S
TR MAX PG: 30 MMHG
TRICUSPID ANNULAR PLANE SYSTOLIC EXCURSION: 1.96 CM
TV REST PULMONARY ARTERY PRESSURE: 33 MMHG
Z-SCORE OF LEFT VENTRICULAR DIMENSION IN END DIASTOLE: -2.87
Z-SCORE OF LEFT VENTRICULAR DIMENSION IN END SYSTOLE: -0.72

## 2024-07-02 PROCEDURE — 93306 TTE W/DOPPLER COMPLETE: CPT | Mod: 26,,, | Performed by: INTERNAL MEDICINE

## 2024-07-02 PROCEDURE — 93306 TTE W/DOPPLER COMPLETE: CPT

## 2024-07-09 ENCOUNTER — TELEPHONE (OUTPATIENT)
Dept: CARDIOLOGY | Facility: CLINIC | Age: 69
End: 2024-07-09
Payer: MEDICARE

## 2024-07-09 NOTE — TELEPHONE ENCOUNTER
----- Message from Lakia Friedman NP sent at 7/3/2024  2:13 PM CDT -----  No concerning or significant abnormalities noted on echocardiogram.

## 2024-07-09 NOTE — TELEPHONE ENCOUNTER
7/9/24 informed the patient about test results per. Lakia Friedman NP        Patient understood the stated

## 2024-07-25 DIAGNOSIS — E11.9 TYPE 2 DIABETES MELLITUS WITHOUT COMPLICATION, WITHOUT LONG-TERM CURRENT USE OF INSULIN: ICD-10-CM

## 2024-07-25 RX ORDER — GLIMEPIRIDE 2 MG/1
2 TABLET ORAL
Qty: 90 TABLET | Refills: 0 | Status: SHIPPED | OUTPATIENT
Start: 2024-07-25 | End: 2025-07-25

## 2024-08-02 ENCOUNTER — PATIENT MESSAGE (OUTPATIENT)
Dept: ADMINISTRATIVE | Facility: HOSPITAL | Age: 69
End: 2024-08-02
Payer: MEDICARE

## 2024-08-12 ENCOUNTER — PATIENT MESSAGE (OUTPATIENT)
Dept: ADMINISTRATIVE | Facility: HOSPITAL | Age: 69
End: 2024-08-12
Payer: MEDICARE

## 2024-08-22 ENCOUNTER — OFFICE VISIT (OUTPATIENT)
Dept: FAMILY MEDICINE | Facility: CLINIC | Age: 69
End: 2024-08-22
Payer: MEDICARE

## 2024-08-22 VITALS
OXYGEN SATURATION: 99 % | DIASTOLIC BLOOD PRESSURE: 80 MMHG | BODY MASS INDEX: 35.79 KG/M2 | HEART RATE: 95 BPM | SYSTOLIC BLOOD PRESSURE: 121 MMHG | TEMPERATURE: 98 F | WEIGHT: 195.69 LBS

## 2024-08-22 DIAGNOSIS — E11.3293 TYPE 2 DIABETES MELLITUS WITH BOTH EYES AFFECTED BY MILD NONPROLIFERATIVE RETINOPATHY WITHOUT MACULAR EDEMA, WITHOUT LONG-TERM CURRENT USE OF INSULIN: ICD-10-CM

## 2024-08-22 DIAGNOSIS — I50.9 CONGESTIVE HEART FAILURE, UNSPECIFIED HF CHRONICITY, UNSPECIFIED HEART FAILURE TYPE: ICD-10-CM

## 2024-08-22 DIAGNOSIS — F17.210 CIGARETTE SMOKER: Primary | ICD-10-CM

## 2024-08-22 DIAGNOSIS — R54 FRAILTY SYNDROME IN GERIATRIC PATIENT: ICD-10-CM

## 2024-08-22 DIAGNOSIS — I48.91 ATRIAL FIBRILLATION WITH RVR: ICD-10-CM

## 2024-08-22 DIAGNOSIS — E11.9 TYPE 2 DIABETES MELLITUS WITHOUT COMPLICATION, WITHOUT LONG-TERM CURRENT USE OF INSULIN: ICD-10-CM

## 2024-08-22 DIAGNOSIS — E66.01 CLASS 3 SEVERE OBESITY WITH BODY MASS INDEX (BMI) OF 40.0 TO 44.9 IN ADULT, UNSPECIFIED OBESITY TYPE, UNSPECIFIED WHETHER SERIOUS COMORBIDITY PRESENT: ICD-10-CM

## 2024-08-22 DIAGNOSIS — I50.32 CHRONIC DIASTOLIC CONGESTIVE HEART FAILURE: ICD-10-CM

## 2024-08-22 DIAGNOSIS — E55.9 VITAMIN D DEFICIENCY: ICD-10-CM

## 2024-08-22 DIAGNOSIS — I35.0 MODERATE AORTIC STENOSIS: ICD-10-CM

## 2024-08-22 DIAGNOSIS — I10 HYPERTENSION, UNSPECIFIED TYPE: ICD-10-CM

## 2024-08-22 PROCEDURE — 99999 PR PBB SHADOW E&M-EST. PATIENT-LVL IV: CPT | Mod: PBBFAC,HCNC,, | Performed by: FAMILY MEDICINE

## 2024-08-22 NOTE — PROGRESS NOTES
Subjective:       Patient ID: Courtney Nino is a 69 y.o. female.    Chief Complaint: Hypertension and Diabetes      Is here for follow-up on hypertension and diabetes.  She is a full-time mom of 2 grandchildren and quite stressed.  Pressure has been in the 140s 150s at home.BP Readings from Last 3 Encounters:  08/22/24 : 121/80  04/09/24 : 119/83  02/22/24 : 118/66  Lab Results       Component                Value               Date                       WBC                      8.24                02/15/2024                 HGB                      15.9                02/15/2024                 HCT                      49.7 (H)            02/15/2024                 PLT                      272                 02/15/2024                 CHOL                     223 (H)             03/24/2023                 TRIG                     180 (H)             03/24/2023                 HDL                      35 (L)              03/24/2023                 ALT                      14                  02/15/2024                 AST                      15                  02/15/2024                 NA                       140                 02/15/2024                 K                        4.3                 02/15/2024                 CL                       102                 02/15/2024                 CREATININE               0.9                 02/15/2024                 BUN                      12                  02/15/2024                 CO2                      31 (H)              02/15/2024                 TSH                      3.870               10/25/2021                 INR                      1.1                 10/25/2021                 HGBA1C                   10.5 (H)            03/24/2023              Wt Readings from Last 4 Encounters:  08/22/24 : 88.8 kg (195 lb 11.2 oz)  07/02/24 : 89.7 kg (197 lb 12 oz)  04/09/24 : 89.7 kg (197 lb 12.8 oz)  02/22/24 : 90.7 kg (200 lb)  Has been able  to decrease portions and watches her intake.      Hypertension  This is a chronic problem. The problem is unchanged. The problem is controlled. Pertinent negatives include no chest pain, orthopnea or shortness of breath.   Diabetes  She presents for her follow-up diabetic visit. She has type 2 diabetes mellitus. Pertinent negatives for diabetes include no chest pain. Her breakfast blood glucose range is generally 130-140 mg/dl. Her lunch blood glucose range is generally 130-140 mg/dl. Her bedtime blood glucose range is generally 130-140 mg/dl.       Allergies and Medications:   Review of patient's allergies indicates:  No Known Allergies  Current Outpatient Medications   Medication Sig Dispense Refill    alcohol swabs (BD ALCOHOL SWABS) PadM Apply 1 each topically as needed. 200 each 3    apixaban (ELIQUIS) 5 mg Tab Take 1 tablet (5 mg total) by mouth 2 (two) times daily. 180 tablet 3    aspirin 325 MG tablet Take 325 mg by mouth once daily.      blood sugar diagnostic Strp To check BG 2 times daily, to use with insurance preferred meter 200 strip 3    blood-glucose meter kit To check BG 2 times daily, to use with insurance preferred meter 1 each 0    calcium carbonate-vitamin D3 (LIQUID CALCIUM WITH VITAMIN D) 600 mg-5 mcg (200 unit) Cap Take 1 capsule by mouth 2 (two) times daily. for 365 doses 180 capsule 3    diltiaZEM (CARDIZEM CD) 360 MG 24 hr capsule Take 1 capsule (360 mg total) by mouth once daily. 90 capsule 11    dulaglutide (TRULICITY) 3 mg/0.5 mL pen injector Inject 3 mg into the skin every 7 days. 12 pen 3    furosemide (LASIX) 40 MG tablet Take 1 tablet (40 mg total) by mouth 2 (two) times a day. 180 tablet 3    glimepiride (AMARYL) 2 MG tablet Take 1 tablet (2 mg total) by mouth before breakfast. 90 tablet 0    ibandronate (BONIVA) 150 mg tablet Take 1 tablet (150 mg total) by mouth every 30 days. 1 tablet 11    lancets (BD ULTRA FINE LANCETS) 33 gauge Misc 1 lancet  by Misc.(Non-Drug; Combo Route)  route 2 (two) times daily. 200 each 3    lancets Misc To check BG 2 times daily, to use with insurance preferred meter 200 each 1    losartan (COZAAR) 50 MG tablet Take 1 tablet (50 mg total) by mouth once daily. 90 tablet 1    magnesium oxide (MAG-OX) 400 mg (241.3 mg magnesium) tablet Take 1 tablet (400 mg total) by mouth 2 (two) times daily. 90 tablet 3    metFORMIN (GLUCOPHAGE) 500 MG tablet Take 1 tablet (500 mg total) by mouth 2 (two) times daily with meals. 180 tablet 1    metoprolol succinate (TOPROL-XL) 50 MG 24 hr tablet Take 1 tablet (50 mg total) by mouth once daily. 90 tablet 3    nitroglycerin (NITROGLYN) 0.2 % ointment Place 1 Package (30,000 mg total) onto the skin every evening. Apply to toes every night 30 g 0    omega-3 fatty acids/fish oil (FISH OIL-OMEGA-3 FATTY ACIDS) 300-1,000 mg capsule Take 1 capsule by mouth once daily.      potassium chloride SA (K-DUR,KLOR-CON) 20 MEQ tablet Take 20 mEq by mouth 2 (two) times daily.      pravastatin (PRAVACHOL) 40 MG tablet TAKE 1 TABLET ONE TIME DAILY 90 tablet 0    vitamin E 1000 UNIT capsule Take 1,000 Units by mouth once daily.      digoxin (LANOXIN) 125 mcg tablet Take 1 tablet (0.125 mg total) by mouth once daily. 90 tablet 1     No current facility-administered medications for this visit.       Family History:   Family History   Problem Relation Name Age of Onset    Heart disease Mother      Heart disease Father      COPD Father      Breast cancer Maternal Grandmother         Social History:   Social History     Socioeconomic History    Marital status:    Tobacco Use    Smoking status: Every Day     Current packs/day: 1.00     Average packs/day: 1 pack/day for 39.0 years (39.0 ttl pk-yrs)     Types: Cigarettes    Smokeless tobacco: Never   Substance and Sexual Activity    Alcohol use: Not Currently    Drug use: Not Currently     Social Determinants of Health     Physical Activity: Inactive (2/11/2021)    Exercise Vital Sign     Days of  Exercise per Week: 0 days     Minutes of Exercise per Session: 0 min   Stress: No Stress Concern Present (2/11/2021)    [employer] - Occupational Stress Questionnaire     Feeling of Stress : Not at all       Review of Systems   Respiratory:  Negative for shortness of breath.    Cardiovascular:  Negative for chest pain and orthopnea.       Objective:     Vitals:    08/22/24 0927   BP: 121/80   Pulse: 95   Temp: 97.9 °F (36.6 °C)        Physical Exam  Vitals and nursing note reviewed.   Constitutional:       General: She is not in acute distress.     Appearance: Normal appearance. She is well-developed and normal weight. She is not ill-appearing, toxic-appearing or diaphoretic.   HENT:      Head: Normocephalic and atraumatic.   Eyes:      Pupils: Pupils are equal, round, and reactive to light.   Cardiovascular:      Rate and Rhythm: Normal rate and regular rhythm.      Heart sounds: Normal heart sounds. No murmur heard.     No friction rub. No gallop.   Pulmonary:      Effort: Pulmonary effort is normal. No respiratory distress.      Breath sounds: Normal breath sounds. No stridor. No wheezing, rhonchi or rales.   Chest:      Chest wall: No tenderness.   Musculoskeletal:      Right lower leg: No edema.      Left lower leg: No edema.   Neurological:      Mental Status: She is alert.   Psychiatric:         Behavior: Behavior normal.         Thought Content: Thought content normal.         Judgment: Judgment normal.         Assessment:       1. Cigarette smoker    2. Class 3 severe obesity with body mass index (BMI) of 40.0 to 44.9 in adult, unspecified obesity type, unspecified whether serious comorbidity present    3. Atrial fibrillation with RVR    4. Type 2 diabetes mellitus without complication, without long-term current use of insulin    5. Moderate aortic stenosis    6. Hypertension, unspecified type    7. Congestive heart failure, unspecified HF chronicity, unspecified heart failure  type    8. Chronic diastolic congestive heart failure    9. Vitamin D deficiency    10. Type 2 diabetes mellitus with both eyes affected by mild nonproliferative retinopathy without macular edema, without long-term current use of insulin    11. Frailty syndrome in geriatric patient        Plan:       Courtney was seen today for hypertension and diabetes.    Diagnoses and all orders for this visit:    Cigarette smoker    Class 3 severe obesity with body mass index (BMI) of 40.0 to 44.9 in adult, unspecified obesity type, unspecified whether serious comorbidity present    Atrial fibrillation with RVR    Type 2 diabetes mellitus without complication, without long-term current use of insulin  -     Hemoglobin A1C; Future  -     Lipid Panel; Future  -     Comprehensive Metabolic Panel; Future  -     Microalbumin/Creatinine Ratio, Urine; Future  -     Diabetes Digital Medicine (DDMP) Enrollment Order    Moderate aortic stenosis    Hypertension, unspecified type  -     Diabetes Digital Medicine (DDMP) Enrollment Order    Congestive heart failure, unspecified HF chronicity, unspecified heart failure type    Chronic diastolic congestive heart failure  -     B-TYPE NATRIURETIC PEPTIDE; Future  -     Diabetes Digital Medicine (DDMP) Enrollment Order    Vitamin D deficiency  -     Vitamin D; Future    Type 2 diabetes mellitus with both eyes affected by mild nonproliferative retinopathy without macular edema, without long-term current use of insulin    Frailty syndrome in geriatric patient secondary to CHF diastolic and exertional dyspnea.         Follow up in about 3 months (around 11/22/2024) for follow up cholesterol, follow up HTN, follow up DM.

## 2024-09-03 ENCOUNTER — PATIENT MESSAGE (OUTPATIENT)
Dept: ADMINISTRATIVE | Facility: HOSPITAL | Age: 69
End: 2024-09-03
Payer: MEDICARE

## 2024-09-18 ENCOUNTER — TELEPHONE (OUTPATIENT)
Dept: FAMILY MEDICINE | Facility: CLINIC | Age: 69
End: 2024-09-18
Payer: MEDICARE

## 2024-09-18 DIAGNOSIS — Z12.31 SCREENING MAMMOGRAM FOR BREAST CANCER: Primary | ICD-10-CM

## 2024-09-18 NOTE — TELEPHONE ENCOUNTER
Spoke with patient mammogram scheduled for 9/25/24 at [facility].

## 2024-09-22 DIAGNOSIS — E11.9 TYPE 2 DIABETES MELLITUS WITHOUT COMPLICATION, WITHOUT LONG-TERM CURRENT USE OF INSULIN: ICD-10-CM

## 2024-09-23 RX ORDER — METFORMIN HYDROCHLORIDE 500 MG/1
500 TABLET ORAL 2 TIMES DAILY WITH MEALS
Qty: 180 TABLET | Refills: 3 | Status: SHIPPED | OUTPATIENT
Start: 2024-09-23

## 2024-09-25 ENCOUNTER — HOSPITAL ENCOUNTER (OUTPATIENT)
Dept: RADIOLOGY | Facility: HOSPITAL | Age: 69
Discharge: HOME | End: 2024-09-25
Attending: FAMILY MEDICINE
Payer: MEDICARE

## 2024-09-25 VITALS — BODY MASS INDEX: 35.88 KG/M2 | WEIGHT: 195 LBS | HEIGHT: 62 IN

## 2024-09-25 DIAGNOSIS — Z12.31 SCREENING MAMMOGRAM FOR BREAST CANCER: ICD-10-CM

## 2024-09-25 PROCEDURE — 77063 BREAST TOMOSYNTHESIS BI: CPT | Mod: 26,,, | Performed by: RADIOLOGY

## 2024-09-25 PROCEDURE — 77067 SCR MAMMO BI INCL CAD: CPT | Mod: TC,PO

## 2024-09-25 PROCEDURE — 77067 SCR MAMMO BI INCL CAD: CPT | Mod: 26,,, | Performed by: RADIOLOGY

## 2024-10-11 DIAGNOSIS — E11.9 TYPE 2 DIABETES MELLITUS WITHOUT COMPLICATION, WITHOUT LONG-TERM CURRENT USE OF INSULIN: ICD-10-CM

## 2024-10-13 RX ORDER — GLIMEPIRIDE 2 MG/1
2 TABLET ORAL
Qty: 90 TABLET | Refills: 3 | Status: SHIPPED | OUTPATIENT
Start: 2024-10-13

## 2024-10-15 ENCOUNTER — PATIENT MESSAGE (OUTPATIENT)
Dept: FAMILY MEDICINE | Facility: CLINIC | Age: 69
End: 2024-10-15
Payer: MEDICARE

## 2024-10-26 DIAGNOSIS — E78.2 MIXED HYPERLIPIDEMIA: ICD-10-CM

## 2024-10-26 DIAGNOSIS — E11.9 TYPE 2 DIABETES MELLITUS WITHOUT COMPLICATION, WITHOUT LONG-TERM CURRENT USE OF INSULIN: ICD-10-CM

## 2024-10-28 RX ORDER — ISOPROPYL ALCOHOL 70 ML/100ML
SWAB TOPICAL
Qty: 200 EACH | Refills: 3 | Status: SHIPPED | OUTPATIENT
Start: 2024-10-28

## 2024-10-28 RX ORDER — PRAVASTATIN SODIUM 40 MG/1
40 TABLET ORAL
Qty: 90 TABLET | Refills: 3 | Status: SHIPPED | OUTPATIENT
Start: 2024-10-28

## 2024-10-28 RX ORDER — LANCETS 33 GAUGE
EACH MISCELLANEOUS 2 TIMES DAILY
Qty: 200 EACH | Refills: 3 | Status: SHIPPED | OUTPATIENT
Start: 2024-10-28

## 2024-10-28 RX ORDER — CALCIUM CITRATE/VITAMIN D3 200MG-6.25
TABLET ORAL 2 TIMES DAILY
Qty: 200 STRIP | Refills: 3 | Status: SHIPPED | OUTPATIENT
Start: 2024-10-28

## 2024-11-04 ENCOUNTER — PATIENT OUTREACH (OUTPATIENT)
Dept: ADMINISTRATIVE | Facility: HOSPITAL | Age: 69
End: 2024-11-04
Payer: MEDICARE

## 2024-11-04 NOTE — PROGRESS NOTES
[facility] Review & Patient Outreach Details      Contacted patient by phone:    Unable to reach/ Follow up date noted  Portal message sent / Letter mailed. Called left voice message.        Primary Care Visit:  Primary Care Office Visit Scheduled            My [facility] Portal Enrollment:  Patient active    Care Gaps Reviewed

## 2024-11-04 NOTE — LETTER
November 4, 2024    Courtney Aliamarlene Nino  [address]  [place] [address]             [facility]  [address]  [facility] [address]  Phone: [phone]        [facility] is committed to your overall health. Periodically we review the health information in your chart to make sure you are up to date on all of your recommended tests and/or procedures.       Our review of your chart shows that you may be due for       [facility] Score: 7     Colon Cancer Screening  Urine Screening  Eye Exam  Hemoglobin A1c  Lipid Panel  Foot Exam  LDCT Lung Screen    Influenza Vaccine  Shingles/Zoster Vaccine  RSV Vaccine                If you have had any of the above done at another facility, please let us know and we will request a copy of the report to update your [facility] record.       At your convenience I would like to speak to you to help get these items scheduled (if needed) and also see if there is anything else we can do to help you. Please send me a message via your patient portal or give me a call at [phone].  I am looking forward to speaking with you soon.     Sincerely,      Vidhya Ansari LPN Care Coordinator  Jerrod Stubbs MD and your [facility] Primary Care Team

## 2024-11-06 DIAGNOSIS — E78.2 MIXED HYPERLIPIDEMIA: ICD-10-CM

## 2024-11-06 RX ORDER — LOSARTAN POTASSIUM 50 MG/1
50 TABLET ORAL DAILY
Qty: 90 TABLET | Refills: 1 | Status: SHIPPED | OUTPATIENT
Start: 2024-11-06

## 2024-12-14 DIAGNOSIS — I48.91 ATRIAL FIBRILLATION BY ELECTROCARDIOGRAM: ICD-10-CM

## 2024-12-18 RX ORDER — APIXABAN 5 MG/1
5 TABLET, FILM COATED ORAL 2 TIMES DAILY
Qty: 180 TABLET | Refills: 3 | Status: SHIPPED | OUTPATIENT
Start: 2024-12-18

## 2025-01-14 ENCOUNTER — PATIENT MESSAGE (OUTPATIENT)
Dept: ADMINISTRATIVE | Facility: HOSPITAL | Age: 70
End: 2025-01-14
Payer: MEDICARE

## 2025-01-14 DIAGNOSIS — Z00.00 ENCOUNTER FOR MEDICARE ANNUAL WELLNESS EXAM: ICD-10-CM

## 2025-01-27 ENCOUNTER — PATIENT OUTREACH (OUTPATIENT)
Dept: ADMINISTRATIVE | Facility: HOSPITAL | Age: 70
End: 2025-01-27
Payer: MEDICARE

## 2025-01-27 NOTE — LETTER
AUTHORIZATION FOR RELEASE OF   CONFIDENTIAL INFORMATION    EYE CARE     We are seeing Courtney Nino, date of birth 1955, in the clinic at [facility] [address]. Jerrod Stubbs MD is the patient's PCP. Courtney Nino has an outstanding lab/procedure at the time we reviewed her chart. In order to help keep her health information updated, she has authorized us to request the following medical record(s):         EYE EXAM           Please fax records to [phone] or email to [email or web address].    Thank you So much!               If you have any questions, please contact Emy Mar, Care Coordinator   at [phone].              Patient Name: Courtney Nino  : 1955  Patient Phone #: [phone]

## 2025-01-27 NOTE — PROGRESS NOTES
PAYOR NON COMPLIANT REPORT    PAYOR ATTESTATION REPORT 2024    REQUESTED 2023 REPORT  COMPREHENSIVE DIABETES CARE: EYE EXAM

## 2025-02-03 ENCOUNTER — PATIENT OUTREACH (OUTPATIENT)
Dept: ADMINISTRATIVE | Facility: HOSPITAL | Age: 70
End: 2025-02-03
Payer: MEDICARE

## 2025-02-03 NOTE — LETTER
February 3, 2025    Courtney Aliaaudrey Nino  [address]  [place] [address]             [facility]  [address]  [facility] [address]  Phone: [phone]        [facility] is committed to your overall health. Periodically we review the health information in your chart to make sure you are up to date on all of your recommended tests and/or procedures.       Our review of your chart shows that you may be due for       [facility] Score: 7     Colon Cancer Screening  Urine Screening  Eye Exam  Hemoglobin A1c  Lipid Panel  Foot Exam  LDCT Lung Screen    Influenza Vaccine  Shingles/Zoster Vaccine  RSV Vaccine   Primary Care Visit             If you have had any of the above done at another facility, please let us know and we will request a copy of the report to update your [facility] record.       At your convenience I would like to speak to you to help get these items scheduled (if needed) and also see if there is anything else we can do to help you. Please send me a message via your patient portal or give me a call at [phone].  I am looking forward to speaking with you soon.     Sincerely,      Vidhya Ansari MELBA Care Coordinator  Jerrod Stubbs MD and your [facility] Primary Care Team

## 2025-02-03 NOTE — PROGRESS NOTES
Care Coordination Encounter Details:       MyChart Portal Status:         []  Reviewed MyChart Portal Status offered / enrolled if applicable        Additional Notes:     MyChart Outcomes: Pt has unread protal messages.         Updates Requested / Reviewed:        Updated Care Coordination Note, Care Everywhere, , External Sources: [facility], [facility], and [facility], Removed  or Duplicate Orders, and Immunizations Reconciliation Completed or Queried: [place]         Health Maintenance Screening(s) Due:      Health Maintenance Topics Overdue:      [facility] Score: 7     Colon Cancer Screening  Urine Screening  Eye Exam  Hemoglobin A1c  Lipid Panel  Foot Exam  LDCT Lung Screen    Influenza Vaccine  Shingles/Zoster Vaccine  RSV Vaccine                  Health Maintenance Topic(s) Outreach Outcomes & Actions Taken:    Colorectal Cancer Screening - Outreach Outcomes & Actions Taken  : Reminder letter mailed.    Eye Exam - Outreach Outcomes & Actions Taken  : Reminder letter mailed.    Lab(s) - Outreach Outcomes & Actions Taken  : Reminder letter mailed.    Diabetic Foot Exam - Outreach Outcomes & Actions Taken  : Reminder letter mailed    Primary Care Appt - Outreach Outcomes & Actions Taken  : Reminder letter mailed.           Additional Notes:  Called regarding overdue health maintenance and value base resources, left voice message.            Chronic Disease Management:     Diabetes Measures        Lab Results   Component Value Date    HGBA1C 10.5 (H) 2023           [x]  Reviewed chart for active Diabetes diagnosis     []  Scheduled necessary follow up appointments if needed         Additional Notes:             Hypertension Measures        BP Readings from Last 1 Encounters:   24 121/80           [x]  Reviewed chart for active Hypertension diagnosis     []  Reviewed & documented Home BP Cuff     []  Documented a Remote BP if needed & applicable     []  Scheduled necessary follow up  appointments with Primary Care if needed         Additional Notes:             Provider Team Continuity:     Last PCP Visit Date: 8/22/2024          [x]  Reviewed Primary Care Provider Visits, Annual Wellness Visit, and Future          Appointments to ensure appointments have been scheduled and/or           completed        Additional Notes:             Social Determinants of Health          []  Reviewed, completed, and/or updated the following sections:                  Food Insecurity, Transportation Needs, Financial Resource Strain,                 Tobacco Use        Additional Notes:  Letter mailed.           Care Management, Digital Medicine, and/or Education Referrals    OPCM Risk Score: 34.5         Next Steps - Referral Actions: Digital Medicine information sent from DM Team.        Additional Notes:

## 2025-02-07 ENCOUNTER — HOSPITAL ENCOUNTER (INPATIENT)
Facility: HOSPITAL | Age: 70
LOS: 4 days | Discharge: HOME | DRG: 291 | End: 2025-02-11
Attending: STUDENT IN AN ORGANIZED HEALTH CARE EDUCATION/TRAINING PROGRAM | Admitting: STUDENT IN AN ORGANIZED HEALTH CARE EDUCATION/TRAINING PROGRAM
Payer: MEDICARE

## 2025-02-07 DIAGNOSIS — R06.02 SOB (SHORTNESS OF BREATH): ICD-10-CM

## 2025-02-07 DIAGNOSIS — R06.03 RESPIRATORY DISTRESS: ICD-10-CM

## 2025-02-07 DIAGNOSIS — I50.9 CONGESTIVE HEART FAILURE: ICD-10-CM

## 2025-02-07 DIAGNOSIS — I48.91 ATRIAL FIBRILLATION WITH RVR: ICD-10-CM

## 2025-02-07 DIAGNOSIS — I48.91 AFIB: ICD-10-CM

## 2025-02-07 DIAGNOSIS — I48.0 PAROXYSMAL ATRIAL FIBRILLATION: ICD-10-CM

## 2025-02-07 DIAGNOSIS — I50.33 ACUTE ON CHRONIC DIASTOLIC CONGESTIVE HEART FAILURE: Primary | ICD-10-CM

## 2025-02-07 DIAGNOSIS — J96.01 ACUTE RESPIRATORY FAILURE WITH HYPOXIA: ICD-10-CM

## 2025-02-07 DIAGNOSIS — J81.0 ACUTE PULMONARY EDEMA: ICD-10-CM

## 2025-02-07 DIAGNOSIS — R07.9 CHEST PAIN: ICD-10-CM

## 2025-02-07 DIAGNOSIS — I48.91 ATRIAL FIBRILLATION BY ELECTROCARDIOGRAM: ICD-10-CM

## 2025-02-07 LAB
ALBUMIN SERPL BCP-MCNC: 4 G/DL (ref 3.5–5.2)
ALLENS TEST: ABNORMAL
ALP SERPL-CCNC: 79 U/L (ref 55–135)
ALT SERPL W/O P-5'-P-CCNC: 59 U/L (ref 10–44)
ANION GAP SERPL CALC-SCNC: 9 MMOL/L (ref 8–16)
AST SERPL-CCNC: 24 U/L (ref 10–40)
BASOPHILS # BLD AUTO: 0.07 K/UL (ref 0–0.2)
BASOPHILS NFR BLD: 0.5 % (ref 0–1.9)
BILIRUB SERPL-MCNC: 0.9 MG/DL (ref 0.1–1)
BNP SERPL-MCNC: 292 PG/ML (ref 0–99)
BUN SERPL-MCNC: 15 MG/DL (ref 8–23)
CALCIUM SERPL-MCNC: 8.8 MG/DL (ref 8.7–10.5)
CHLORIDE SERPL-SCNC: 109 MMOL/L (ref 95–110)
CO2 SERPL-SCNC: 24 MMOL/L (ref 23–29)
CREAT SERPL-MCNC: 0.9 MG/DL (ref 0.5–1.4)
DELSYS: ABNORMAL
DIFFERENTIAL METHOD BLD: ABNORMAL
EOSINOPHIL # BLD AUTO: 0.1 K/UL (ref 0–0.5)
EOSINOPHIL NFR BLD: 0.8 % (ref 0–8)
EP: 8
ERYTHROCYTE [DISTWIDTH] IN BLOOD BY AUTOMATED COUNT: 14.3 % (ref 11.5–14.5)
ERYTHROCYTE [SEDIMENTATION RATE] IN BLOOD BY WESTERGREN METHOD: 15 MM/H
EST. GFR  (NO RACE VARIABLE): >60 ML/MIN/1.73 M^2
ESTIMATED AVG GLUCOSE: 114 MG/DL (ref 68–131)
FIO2: 30
GLUCOSE SERPL-MCNC: 157 MG/DL (ref 70–110)
GLUCOSE SERPL-MCNC: 159 MG/DL (ref 70–110)
HBA1C MFR BLD: 5.6 % (ref 4.5–6.2)
HCO3 UR-SCNC: 23.6 MMOL/L (ref 24–28)
HCT VFR BLD AUTO: 45.7 % (ref 37–48.5)
HCT VFR BLD CALC: 45 %PCV (ref 36–54)
HCV AB SERPL QL IA: NEGATIVE
HGB BLD-MCNC: 14.6 G/DL (ref 12–16)
HIV 1+2 AB+HIV1 P24 AG SERPL QL IA: NEGATIVE
IMM GRANULOCYTES # BLD AUTO: 0.04 K/UL (ref 0–0.04)
IMM GRANULOCYTES NFR BLD AUTO: 0.3 % (ref 0–0.5)
INFLUENZA A, MOLECULAR: NEGATIVE
INFLUENZA B, MOLECULAR: NEGATIVE
INR PPP: 1.1 (ref 0.8–1.2)
IP: 14
LYMPHOCYTES # BLD AUTO: 2.3 K/UL (ref 1–4.8)
LYMPHOCYTES NFR BLD: 17.6 % (ref 18–48)
MAGNESIUM SERPL-MCNC: 1.8 MG/DL (ref 1.6–2.6)
MCH RBC QN AUTO: 30 PG (ref 27–31)
MCHC RBC AUTO-ENTMCNC: 31.9 G/DL (ref 32–36)
MCV RBC AUTO: 94 FL (ref 82–98)
MIN VOL: 14.8
MODE: ABNORMAL
MONOCYTES # BLD AUTO: 1 K/UL (ref 0.3–1)
MONOCYTES NFR BLD: 7.6 % (ref 4–15)
NEUTROPHILS # BLD AUTO: 9.6 K/UL (ref 1.8–7.7)
NEUTROPHILS NFR BLD: 73.2 % (ref 38–73)
NRBC BLD-RTO: 0 /100 WBC
PCO2 BLDA: 37.3 MMHG (ref 35–45)
PH SMN: 7.41 [PH] (ref 7.35–7.45)
PLATELET # BLD AUTO: 267 K/UL (ref 150–450)
PMV BLD AUTO: 11.8 FL (ref 9.2–12.9)
PO2 BLDA: 68 MMHG (ref 40–60)
POC BE: -1 MMOL/L
POC IONIZED CALCIUM: 1.13 MMOL/L (ref 1.06–1.42)
POC SATURATED O2: 94 % (ref 95–100)
POC TCO2: 25 MMOL/L (ref 24–29)
POCT GLUCOSE: 127 MG/DL (ref 70–110)
POCT GLUCOSE: 157 MG/DL (ref 70–110)
POTASSIUM BLD-SCNC: 4.6 MMOL/L (ref 3.5–5.1)
POTASSIUM SERPL-SCNC: 4.3 MMOL/L (ref 3.5–5.1)
PROT SERPL-MCNC: 6.7 G/DL (ref 6–8.4)
PROTHROMBIN TIME: 11.9 SEC (ref 9–12.5)
RBC # BLD AUTO: 4.86 M/UL (ref 4–5.4)
SAMPLE: ABNORMAL
SARS-COV-2 RDRP RESP QL NAA+PROBE: NEGATIVE
SITE: ABNORMAL
SODIUM BLD-SCNC: 142 MMOL/L (ref 136–145)
SODIUM SERPL-SCNC: 142 MMOL/L (ref 136–145)
SP02: 96
SPECIMEN SOURCE: NORMAL
SPONT RATE: 26
TROPONIN I SERPL HS-MCNC: 8.1 PG/ML (ref 0–14.9)
TROPONIN I SERPL HS-MCNC: 9.1 PG/ML (ref 0–14.9)
TSH SERPL DL<=0.005 MIU/L-ACNC: 4.37 UIU/ML (ref 0.34–5.6)
WBC # BLD AUTO: 13.09 K/UL (ref 3.9–12.7)

## 2025-02-07 PROCEDURE — 25000003 PHARM REV CODE 250: Performed by: STUDENT IN AN ORGANIZED HEALTH CARE EDUCATION/TRAINING PROGRAM

## 2025-02-07 PROCEDURE — 87635 SARS-COV-2 COVID-19 AMP PRB: CPT | Performed by: STUDENT IN AN ORGANIZED HEALTH CARE EDUCATION/TRAINING PROGRAM

## 2025-02-07 PROCEDURE — 96375 TX/PRO/DX INJ NEW DRUG ADDON: CPT

## 2025-02-07 PROCEDURE — 93005 ELECTROCARDIOGRAM TRACING: CPT | Performed by: INTERNAL MEDICINE

## 2025-02-07 PROCEDURE — 84484 ASSAY OF TROPONIN QUANT: CPT | Performed by: STUDENT IN AN ORGANIZED HEALTH CARE EDUCATION/TRAINING PROGRAM

## 2025-02-07 PROCEDURE — 63600175 PHARM REV CODE 636 W HCPCS: Performed by: STUDENT IN AN ORGANIZED HEALTH CARE EDUCATION/TRAINING PROGRAM

## 2025-02-07 PROCEDURE — 87502 INFLUENZA DNA AMP PROBE: CPT | Performed by: STUDENT IN AN ORGANIZED HEALTH CARE EDUCATION/TRAINING PROGRAM

## 2025-02-07 PROCEDURE — 82962 GLUCOSE BLOOD TEST: CPT

## 2025-02-07 PROCEDURE — 85014 HEMATOCRIT: CPT

## 2025-02-07 PROCEDURE — 99900031 HC PATIENT EDUCATION (STAT)

## 2025-02-07 PROCEDURE — 83036 HEMOGLOBIN GLYCOSYLATED A1C: CPT | Performed by: STUDENT IN AN ORGANIZED HEALTH CARE EDUCATION/TRAINING PROGRAM

## 2025-02-07 PROCEDURE — 87389 HIV-1 AG W/HIV-1&-2 AB AG IA: CPT | Performed by: EMERGENCY MEDICINE

## 2025-02-07 PROCEDURE — 25000003 PHARM REV CODE 250: Performed by: EMERGENCY MEDICINE

## 2025-02-07 PROCEDURE — 82803 BLOOD GASES ANY COMBINATION: CPT

## 2025-02-07 PROCEDURE — 85610 PROTHROMBIN TIME: CPT | Performed by: STUDENT IN AN ORGANIZED HEALTH CARE EDUCATION/TRAINING PROGRAM

## 2025-02-07 PROCEDURE — 84132 ASSAY OF SERUM POTASSIUM: CPT

## 2025-02-07 PROCEDURE — 96365 THER/PROPH/DIAG IV INF INIT: CPT

## 2025-02-07 PROCEDURE — 96366 THER/PROPH/DIAG IV INF ADDON: CPT

## 2025-02-07 PROCEDURE — 99900035 HC TECH TIME PER 15 MIN (STAT)

## 2025-02-07 PROCEDURE — 80053 COMPREHEN METABOLIC PANEL: CPT | Performed by: STUDENT IN AN ORGANIZED HEALTH CARE EDUCATION/TRAINING PROGRAM

## 2025-02-07 PROCEDURE — 84295 ASSAY OF SERUM SODIUM: CPT

## 2025-02-07 PROCEDURE — 96368 THER/DIAG CONCURRENT INF: CPT

## 2025-02-07 PROCEDURE — 94761 N-INVAS EAR/PLS OXIMETRY MLT: CPT | Mod: XB

## 2025-02-07 PROCEDURE — 99406 BEHAV CHNG SMOKING 3-10 MIN: CPT

## 2025-02-07 PROCEDURE — 99291 CRITICAL CARE FIRST HOUR: CPT

## 2025-02-07 PROCEDURE — 86803 HEPATITIS C AB TEST: CPT | Performed by: EMERGENCY MEDICINE

## 2025-02-07 PROCEDURE — 82330 ASSAY OF CALCIUM: CPT

## 2025-02-07 PROCEDURE — 84443 ASSAY THYROID STIM HORMONE: CPT | Performed by: STUDENT IN AN ORGANIZED HEALTH CARE EDUCATION/TRAINING PROGRAM

## 2025-02-07 PROCEDURE — 21000000 HC CCU ICU ROOM CHARGE

## 2025-02-07 PROCEDURE — 94660 CPAP INITIATION&MGMT: CPT

## 2025-02-07 PROCEDURE — 83880 ASSAY OF NATRIURETIC PEPTIDE: CPT | Performed by: STUDENT IN AN ORGANIZED HEALTH CARE EDUCATION/TRAINING PROGRAM

## 2025-02-07 PROCEDURE — 96374 THER/PROPH/DIAG INJ IV PUSH: CPT | Mod: 59

## 2025-02-07 PROCEDURE — 27100171 HC OXYGEN HIGH FLOW UP TO 24 HOURS

## 2025-02-07 PROCEDURE — 83735 ASSAY OF MAGNESIUM: CPT | Performed by: STUDENT IN AN ORGANIZED HEALTH CARE EDUCATION/TRAINING PROGRAM

## 2025-02-07 PROCEDURE — 84484 ASSAY OF TROPONIN QUANT: CPT | Mod: 91 | Performed by: STUDENT IN AN ORGANIZED HEALTH CARE EDUCATION/TRAINING PROGRAM

## 2025-02-07 PROCEDURE — 94799 UNLISTED PULMONARY SVC/PX: CPT

## 2025-02-07 PROCEDURE — 85025 COMPLETE CBC W/AUTO DIFF WBC: CPT | Performed by: STUDENT IN AN ORGANIZED HEALTH CARE EDUCATION/TRAINING PROGRAM

## 2025-02-07 RX ORDER — ASPIRIN 325 MG
325 TABLET ORAL DAILY
Status: DISCONTINUED | OUTPATIENT
Start: 2025-02-07 | End: 2025-02-08

## 2025-02-07 RX ORDER — SODIUM,POTASSIUM PHOSPHATES 280-250MG
2 POWDER IN PACKET (EA) ORAL
Status: DISCONTINUED | OUTPATIENT
Start: 2025-02-07 | End: 2025-02-11 | Stop reason: HOSPADM

## 2025-02-07 RX ORDER — DIGOXIN 125 MCG
0.12 TABLET ORAL DAILY
Status: DISCONTINUED | OUTPATIENT
Start: 2025-02-07 | End: 2025-02-10

## 2025-02-07 RX ORDER — NITROGLYCERIN 20 MG/100ML
INJECTION INTRAVENOUS
Status: DISPENSED
Start: 2025-02-07 | End: 2025-02-07

## 2025-02-07 RX ORDER — ONDANSETRON HYDROCHLORIDE 2 MG/ML
4 INJECTION, SOLUTION INTRAVENOUS EVERY 12 HOURS PRN
Status: DISCONTINUED | OUTPATIENT
Start: 2025-02-07 | End: 2025-02-11 | Stop reason: HOSPADM

## 2025-02-07 RX ORDER — SODIUM CHLORIDE 0.9 % (FLUSH) 0.9 %
10 SYRINGE (ML) INJECTION
Status: DISCONTINUED | OUTPATIENT
Start: 2025-02-07 | End: 2025-02-11 | Stop reason: HOSPADM

## 2025-02-07 RX ORDER — FUROSEMIDE 10 MG/ML
40 INJECTION INTRAMUSCULAR; INTRAVENOUS EVERY 12 HOURS
Status: DISCONTINUED | OUTPATIENT
Start: 2025-02-07 | End: 2025-02-08

## 2025-02-07 RX ORDER — IBUPROFEN 200 MG
16 TABLET ORAL
Status: DISCONTINUED | OUTPATIENT
Start: 2025-02-07 | End: 2025-02-11 | Stop reason: HOSPADM

## 2025-02-07 RX ORDER — LANOLIN ALCOHOL/MO/W.PET/CERES
800 CREAM (GRAM) TOPICAL
Status: DISCONTINUED | OUTPATIENT
Start: 2025-02-07 | End: 2025-02-11 | Stop reason: HOSPADM

## 2025-02-07 RX ORDER — FUROSEMIDE 10 MG/ML
80 INJECTION INTRAMUSCULAR; INTRAVENOUS
Status: COMPLETED | OUTPATIENT
Start: 2025-02-07 | End: 2025-02-07

## 2025-02-07 RX ORDER — ENOXAPARIN SODIUM 100 MG/ML
1 INJECTION SUBCUTANEOUS EVERY 12 HOURS
Status: DISCONTINUED | OUTPATIENT
Start: 2025-02-07 | End: 2025-02-10

## 2025-02-07 RX ORDER — NITROGLYCERIN 20 MG/100ML
0-400 INJECTION INTRAVENOUS CONTINUOUS
Status: DISCONTINUED | OUTPATIENT
Start: 2025-02-07 | End: 2025-02-08

## 2025-02-07 RX ORDER — DILTIAZEM HYDROCHLORIDE 360 MG/1
360 CAPSULE, EXTENDED RELEASE ORAL DAILY
Status: ON HOLD | COMMUNITY
End: 2025-02-09

## 2025-02-07 RX ORDER — IBUPROFEN 200 MG
24 TABLET ORAL
Status: DISCONTINUED | OUTPATIENT
Start: 2025-02-07 | End: 2025-02-11 | Stop reason: HOSPADM

## 2025-02-07 RX ORDER — GLUCAGON 1 MG
1 KIT INJECTION
Status: DISCONTINUED | OUTPATIENT
Start: 2025-02-07 | End: 2025-02-11 | Stop reason: HOSPADM

## 2025-02-07 RX ORDER — PRAVASTATIN SODIUM 40 MG/1
40 TABLET ORAL NIGHTLY
Status: DISCONTINUED | OUTPATIENT
Start: 2025-02-07 | End: 2025-02-11 | Stop reason: HOSPADM

## 2025-02-07 RX ORDER — INSULIN ASPART 100 [IU]/ML
0-10 INJECTION, SOLUTION INTRAVENOUS; SUBCUTANEOUS
Status: DISCONTINUED | OUTPATIENT
Start: 2025-02-07 | End: 2025-02-11 | Stop reason: HOSPADM

## 2025-02-07 RX ORDER — DROPERIDOL 2.5 MG/ML
0.62 INJECTION, SOLUTION INTRAMUSCULAR; INTRAVENOUS ONCE
Status: COMPLETED | OUTPATIENT
Start: 2025-02-07 | End: 2025-02-07

## 2025-02-07 RX ORDER — ACETAMINOPHEN 325 MG/1
650 TABLET ORAL EVERY 8 HOURS PRN
Status: DISCONTINUED | OUTPATIENT
Start: 2025-02-07 | End: 2025-02-11 | Stop reason: HOSPADM

## 2025-02-07 RX ORDER — DILTIAZEM HYDROCHLORIDE 5 MG/ML
10 INJECTION INTRAVENOUS
Status: COMPLETED | OUTPATIENT
Start: 2025-02-07 | End: 2025-02-07

## 2025-02-07 RX ADMIN — FUROSEMIDE 80 MG: 10 INJECTION, SOLUTION INTRAMUSCULAR; INTRAVENOUS at 05:02

## 2025-02-07 RX ADMIN — DIGOXIN 0.12 MG: 125 TABLET ORAL at 10:02

## 2025-02-07 RX ADMIN — PRAVASTATIN SODIUM 40 MG: 40 TABLET ORAL at 09:02

## 2025-02-07 RX ADMIN — ENOXAPARIN SODIUM 90 MG: 100 INJECTION SUBCUTANEOUS at 10:02

## 2025-02-07 RX ADMIN — DILTIAZEM HYDROCHLORIDE 10 MG: 5 INJECTION, SOLUTION INTRAVENOUS at 08:02

## 2025-02-07 RX ADMIN — FUROSEMIDE 40 MG: 10 INJECTION, SOLUTION INTRAMUSCULAR; INTRAVENOUS at 11:02

## 2025-02-07 RX ADMIN — ASPIRIN 325 MG ORAL TABLET 325 MG: 325 PILL ORAL at 10:02

## 2025-02-07 RX ADMIN — NITROGLYCERIN 50 MCG/MIN: 20 INJECTION INTRAVENOUS at 05:02

## 2025-02-07 RX ADMIN — AMIODARONE HYDROCHLORIDE 1 MG/MIN: 1.8 INJECTION, SOLUTION INTRAVENOUS at 05:02

## 2025-02-07 RX ADMIN — ACETAMINOPHEN 650 MG: 325 TABLET ORAL at 10:02

## 2025-02-07 RX ADMIN — DEXTROSE MONOHYDRATE 5 MG/HR: 50 INJECTION, SOLUTION INTRAVENOUS at 07:02

## 2025-02-07 RX ADMIN — DROPERIDOL 0.62 MG: 2.5 INJECTION, SOLUTION INTRAMUSCULAR; INTRAVENOUS at 06:02

## 2025-02-07 RX ADMIN — AMIODARONE HYDROCHLORIDE 150 MG: 1.5 INJECTION, SOLUTION INTRAVENOUS at 05:02

## 2025-02-07 NOTE — SUBJECTIVE & OBJECTIVE
Past Medical History:   Diagnosis Date    Cigarette nicotine dependence     Hypertension     Obesity     Vitamin D deficiency        Past Surgical History:   Procedure Laterality Date    BREAST SURGERY      knot on right    HYSTERECTOMY      TONSILLECTOMY         Review of patient's allergies indicates:  No Known Allergies    No current facility-administered medications on file prior to encounter.     Current Outpatient Medications on File Prior to Encounter   Medication Sig    aspirin 325 MG tablet Take 325 mg by mouth once daily.    blood-glucose meter kit To check BG 2 times daily, to use with insurance preferred meter    calcium carbonate-vitamin D3 (LIQUID CALCIUM WITH VITAMIN D) 600 mg-5 mcg (200 unit) Cap Take 1 capsule by mouth 2 (two) times daily. for 365 doses    digoxin (LANOXIN) 125 mcg tablet Take 1 tablet (0.125 mg total) by mouth once daily.    diltiaZEM (CARDIZEM CD) 360 MG 24 hr capsule Take 1 capsule (360 mg total) by mouth once daily.    DROPSAFE ALCOHOL PREP PADS PadM USE TOPICALLY AS NEEDED AS DIRECTED    dulaglutide (TRULICITY) 3 mg/0.5 mL pen injector Inject 3 mg into the skin every 7 days.    ELIQUIS 5 mg Tab TAKE 1 TABLET (5 MG TOTAL) BY MOUTH 2 (TWO) TIMES DAILY.    furosemide (LASIX) 40 MG tablet Take 1 tablet (40 mg total) by mouth 2 (two) times a day.    glimepiride (AMARYL) 2 MG tablet TAKE 1 TABLET BEFORE BREAKFAST    ibandronate (BONIVA) 150 mg tablet Take 1 tablet (150 mg total) by mouth every 30 days.    lancets Misc To check BG 2 times daily, to use with insurance preferred meter    losartan (COZAAR) 50 MG tablet TAKE 1 TABLET (50 MG TOTAL) BY MOUTH ONCE DAILY.    magnesium oxide (MAG-OX) 400 mg (241.3 mg magnesium) tablet Take 1 tablet (400 mg total) by mouth 2 (two) times daily.    metFORMIN (GLUCOPHAGE) 500 MG tablet TAKE 1 TABLET TWICE DAILY WITH MEALS    metoprolol succinate (TOPROL-XL) 50 MG 24 hr tablet Take 1 tablet (50 mg total) by mouth once daily.    nitroglycerin  (NITROGLYN) 0.2 % ointment Place 1 Package (30,000 mg total) onto the skin every evening. Apply to toes every night    omega-3 fatty acids/fish oil (FISH OIL-OMEGA-3 FATTY ACIDS) 300-1,000 mg capsule Take 1 capsule by mouth once daily.    potassium chloride SA (K-DUR,KLOR-CON) 20 MEQ tablet Take 20 mEq by mouth 2 (two) times daily.    pravastatin (PRAVACHOL) 40 MG tablet TAKE 1 TABLET EVERY DAY    TRUE METRIX GLUCOSE TEST STRIP Strp TEST BLOOD SUGAR TWICE DAILY    TRUEPLUS LANCETS 33 gauge Misc TEST BLOOD SUGAR TWICE DAILY    vitamin E 1000 UNIT capsule Take 1,000 Units by mouth once daily.     Family History       Problem Relation (Age of Onset)    Breast cancer Maternal Grandmother    COPD Father    Heart disease Mother, Father          Tobacco Use    Smoking status: Every Day     Current packs/day: 1.00     Average packs/day: 1 pack/day for 39.0 years (39.0 ttl pk-yrs)     Types: Cigarettes    Smokeless tobacco: Never   Substance and Sexual Activity    Alcohol use: Not Currently    Drug use: Not Currently    Sexual activity: Not on file     Review of Systems   Constitutional:  Positive for fatigue. Negative for fever.   Respiratory:  Positive for shortness of breath. Negative for cough.    Cardiovascular:  Negative for chest pain and palpitations.   Gastrointestinal:  Negative for abdominal pain.   Musculoskeletal:  Negative for back pain.   Neurological:  Negative for light-headedness and numbness.     Objective:     Vital Signs (Most Recent):  Pulse: (!) 142 (02/07/25 0754)  Resp: 18 (02/07/25 0754)  BP: 128/71 (02/07/25 0754)  SpO2: 96 % (02/07/25 0754) Vital Signs (24h Range):  Pulse:  [116-184] 142  Resp:  [15-32] 18  SpO2:  [94 %-98 %] 96 %  BP: (118-184)/() 128/71     Weight: 89 kg (196 lb 4.8 oz)  Body mass index is 35.9 kg/m².     Physical Exam  Constitutional:       General: She is in acute distress.   Cardiovascular:      Rate and Rhythm: Tachycardia present. Rhythm irregular.   Pulmonary:       Effort: Respiratory distress present.      Breath sounds: No wheezing.   Abdominal:      Palpations: Abdomen is soft.   Musculoskeletal:         General: No swelling.   Skin:     General: Skin is dry.   Neurological:      General: No focal deficit present.      Mental Status: She is alert and oriented to person, place, and time.                Significant Labs: All pertinent labs within the past 24 hours have been reviewed.  CBC:   Recent Labs   Lab 02/07/25  0532 02/07/25  0539   WBC 13.09*  --    HGB 14.6  --    HCT 45.7 45     --      CMP:   Recent Labs   Lab 02/07/25  0532      K 4.3      CO2 24   *   BUN 15   CREATININE 0.9   CALCIUM 8.8   PROT 6.7   ALBUMIN 4.0   BILITOT 0.9   ALKPHOS 79   AST 24   ALT 59*   ANIONGAP 9       Significant Imaging: I have reviewed all pertinent imaging results/findings within the past 24 hours.

## 2025-02-07 NOTE — ASSESSMENT & PLAN NOTE
Patient has Diastolic (HFpEF) heart failure that is Acute on chronic. On presentation their CHF was decompensated. Evidence of decompensated CHF on presentation includes: edema, dyspnea on exertion (MAHAJAN), and shortness of breath. The etiology of their decompensation is likely running out of medications . Most recent BNP and echo results are listed below.  Recent Labs     02/07/25  0537   *     Latest ECHO  Results for orders placed during the hospital encounter of 07/02/24    Echo    Interpretation Summary    Left Ventricle: The left ventricle is normal in size. Mildly increased wall thickness. There is mild concentric hypertrophy. Normal wall motion. There is normal systolic function with a visually estimated ejection fraction of 55 - 60%. There is normal diastolic function.    Right Ventricle: Normal right ventricular cavity size. Wall thickness is normal. Systolic function is normal.    Left Atrium: Left atrium is moderately dilated.    Aortic Valve: The aortic valve is a trileaflet valve. There is moderate aortic valve sclerosis.    Mitral Valve: There is mild posterior leaflet sclerosis.    Tricuspid Valve: There is mild to moderate regurgitation with a centrally directed jet.    Pulmonary Artery: The estimated pulmonary artery systolic pressure is 33 mmHg.    IVC/SVC: Normal venous pressure at 3 mmHg.    Current Heart Failure Medications  nitroGLYCERIN in 5 % dextrose 50 mg/250 mL (200 mcg/mL) infusion, Continuous, Intravenous  nitroGLYCERIN in 5 % dextrose 50 mg/250 mL (200 mcg/mL) infusion, ,   digoxin tablet 0.125 mg, Daily, Oral  furosemide injection 40 mg, Every 12 hours, Intravenous    Plan  - Monitor strict I&Os and daily weights.    - Place on telemetry  - Low sodium diet  - Place on fluid restriction of 1.5 L.   - Cardiology has been consulted  - The patient's volume status is stable but not at their baseline as indicated by shortness of breath  - continue IV Lasix  Repeat echocardiogram

## 2025-02-07 NOTE — CONSULTS
[facility] - Emergency Dept  Department of Cardiology  Consult Note      PATIENT NAME: Courtney Nino  MRN: [id number]  TODAY'S DATE: 02/07/2025  ADMIT DATE: 2/7/2025                          CONSULT REQUESTED BY: Zehra Oviedo MD    SUBJECTIVE     PRINCIPAL PROBLEM: Acute pulmonary edema      REASON FOR CONSULT:  Acute Pulmonary Edema      HPI:    69 year old female patient admitted with SOB. Says she has not taken her medications for 2 weeks. She is on bipap in ER. She has felt SOB for 2 days. Has a PMH significant for COPD, heart failure (though last TTE showed an EF of 55-60%), atrial fibrillation, hypertension, and aortic stenosis.        FROM ER NOTE     Shortness of Breath       Pt says she is out of her meds and that she has been SOB x2 days.  Pt reports no home O2.  Pt hx of COPD, CHF, and A-fib      HPI     69-year-old female with a past medical history of COPD, heart failure (though last TTE showed an EF of 55-60%), atrial fibrillation, hypertension, aortic stenosis presents to the emergency department with concerns of shortness for breath that has been present for the past 2 days.  Patient states she has been out of her medication for the past 2 weeks.  She denies any cough, runny nose, fever, chills, chest pain, nausea, vomiting, lower extremity edema.  She states that she feels as though she is having a heart failure exacerbation.           Review of patient's allergies indicates:  No Known Allergies    Past Medical History:   Diagnosis Date    Cigarette nicotine dependence     Hypertension     Obesity     Vitamin D deficiency      Past Surgical History:   Procedure Laterality Date    BREAST SURGERY      knot on right    HYSTERECTOMY      TONSILLECTOMY       Social History     Tobacco Use    Smoking status: Every Day     Current packs/day: 1.00     Average packs/day: 1 pack/day for 39.0 years (39.0 ttl pk-yrs)     Types: Cigarettes    Smokeless tobacco: Never   Substance Use Topics     Alcohol use: Not Currently    Drug use: Not Currently        REVIEW OF SYSTEMS    As mentioned in HPI    OBJECTIVE     VITAL SIGNS (Most Recent)  Pulse: (!) 142 (02/07/25 0754)  Resp: 18 (02/07/25 0754)  BP: 128/71 (02/07/25 0754)  SpO2: 96 % (02/07/25 0754)    VENTILATION STATUS  Resp: 18 (02/07/25 0754)  SpO2: 96 % (02/07/25 0754)  Oxygen Concentration (%):  [30] 30        I & O (Last 24H):No intake or output data in the 24 hours ending 02/07/25 0839    WEIGHTS  Wt Readings from Last 3 Encounters:   02/07/25 0525 89 kg (196 lb 4.8 oz)   09/25/24 1351 88.5 kg (195 lb)   08/22/24 0927 88.8 kg (195 lb 11.2 oz)       PHYSICAL EXAM    CONSTITUTIONAL: elderly female on bipap  NECK: no JVD  LUNGS:crackles b/l  HEART: irr, tachy  ABDOMEN:  Nondistended  EXTREMITIES: No edema  NEURO: AAO X 3      HOME MEDICATIONS:  No current facility-administered medications on file prior to encounter.     Current Outpatient Medications on File Prior to Encounter   Medication Sig Dispense Refill    aspirin 325 MG tablet Take 325 mg by mouth once daily.      blood-glucose meter kit To check BG 2 times daily, to use with insurance preferred meter 1 each 0    calcium carbonate-vitamin D3 (LIQUID CALCIUM WITH VITAMIN D) 600 mg-5 mcg (200 unit) Cap Take 1 capsule by mouth 2 (two) times daily. for 365 doses 180 capsule 3    digoxin (LANOXIN) 125 mcg tablet Take 1 tablet (0.125 mg total) by mouth once daily. 90 tablet 1    diltiaZEM (CARDIZEM CD) 360 MG 24 hr capsule Take 1 capsule (360 mg total) by mouth once daily. 90 capsule 11    DROPSAFE ALCOHOL PREP PADS PadM USE TOPICALLY AS NEEDED AS DIRECTED 200 each 3    dulaglutide (TRULICITY) 3 mg/0.5 mL pen injector Inject 3 mg into the skin every 7 days. 12 pen 3    ELIQUIS 5 mg Tab TAKE 1 TABLET (5 MG TOTAL) BY MOUTH 2 (TWO) TIMES DAILY. 180 tablet 3    furosemide (LASIX) 40 MG tablet Take 1 tablet (40 mg total) by mouth 2 (two) times a day. 180 tablet 3    glimepiride (AMARYL) 2 MG tablet TAKE  1 TABLET BEFORE BREAKFAST 90 tablet 3    ibandronate (BONIVA) 150 mg tablet Take 1 tablet (150 mg total) by mouth every 30 days. 1 tablet 11    lancets [facility] To check BG 2 times daily, to use with insurance preferred meter 200 each 1    losartan (COZAAR) 50 MG tablet TAKE 1 TABLET (50 MG TOTAL) BY MOUTH ONCE DAILY. 90 tablet 1    magnesium oxide (MAG-OX) 400 mg (241.3 mg magnesium) tablet Take 1 tablet (400 mg total) by mouth 2 (two) times daily. 90 tablet 3    metFORMIN (GLUCOPHAGE) 500 MG tablet TAKE 1 TABLET TWICE DAILY WITH MEALS 180 tablet 3    metoprolol succinate (TOPROL-XL) 50 MG 24 hr tablet Take 1 tablet (50 mg total) by mouth once daily. 90 tablet 3    nitroglycerin (NITROGLYN) 0.2 % ointment Place 1 Package (30,000 mg total) onto the skin every evening. Apply to toes every night 30 g 0    omega-3 fatty acids/fish oil (FISH OIL-OMEGA-3 FATTY ACIDS) 300-1,000 mg capsule Take 1 capsule by mouth once daily.      potassium chloride SA (K-DUR,KLOR-CON) 20 MEQ tablet Take 20 mEq by mouth 2 (two) times daily.      pravastatin (PRAVACHOL) 40 MG tablet TAKE 1 TABLET EVERY DAY 90 tablet 3    TRUE METRIX GLUCOSE TEST STRIP Strp TEST BLOOD SUGAR TWICE DAILY 200 strip 3    TRUEPLUS LANCETS 33 gauge Misc TEST BLOOD SUGAR TWICE DAILY 200 each 3    vitamin E 1000 UNIT capsule Take 1,000 Units by mouth once daily.         SCHEDULED MEDS:   aspirin  325 mg Oral Daily    digoxin  0.125 mg Oral Daily    enoxparin  1 mg/kg Subcutaneous Q12H (prophylaxis, 0900/2100)    furosemide (LASIX) injection  40 mg Intravenous Q12H    pravastatin  40 mg Oral QHS       CONTINUOUS INFUSIONS:   amiodarone in dextrose 5%  1 mg/min Intravenous Continuous   Stopped at 02/07/25 0757    amiodarone in dextrose 5%  0.5 mg/min Intravenous Continuous        dilTIAZem  0-15 mg/hr Intravenous Continuous 5 mL/hr at 02/07/25 0742 5 mg/hr at 02/07/25 0742    nitroGLYCERIN  0-400 mcg/min Intravenous Continuous 7.5 mL/hr at 02/07/25 0544 25 mcg/min at  "02/07/25 0544       PRN MEDS:  Current Facility-Administered Medications:     acetaminophen, 650 mg, Oral, Q8H PRN    dextrose 50%, 12.5 g, Intravenous, PRN    dextrose 50%, 25 g, Intravenous, PRN    glucagon (human recombinant), 1 mg, Intramuscular, PRN    glucose, 16 g, Oral, PRN    glucose, 24 g, Oral, PRN    insulin aspart U-100, 0-10 Units, Subcutaneous, QID (AC + HS) PRN    magnesium oxide, 800 mg, Oral, PRN    magnesium oxide, 800 mg, Oral, PRN    ondansetron, 4 mg, Intravenous, Q12H PRN    potassium bicarbonate, 35 mEq, Oral, PRN    potassium bicarbonate, 50 mEq, Oral, PRN    potassium bicarbonate, 60 mEq, Oral, PRN    potassium, sodium phosphates, 2 packet, Oral, PRN    potassium, sodium phosphates, 2 packet, Oral, PRN    potassium, sodium phosphates, 2 packet, Oral, PRN    sodium chloride 0.9%, 10 mL, Intravenous, PRN    LABS AND DIAGNOSTICS     CBC LAST 3 DAYS  Recent Labs   Lab 02/07/25  0532 02/07/25  0539   WBC 13.09*  --    RBC 4.86  --    HGB 14.6  --    HCT 45.7 45   MCV 94  --    MCH 30.0  --    MCHC 31.9*  --    RDW 14.3  --      --    MPV 11.8  --    GRAN 73.2*  9.6*  --    LYMPH 17.6*  2.3  --    MONO 7.6  1.0  --    BASO 0.07  --    NRBC 0  --        COAGULATION LAST 3 DAYS  Recent Labs   Lab 02/07/25  0532   INR 1.1       CHEMISTRY LAST 3 DAYS  Recent Labs   Lab 02/07/25  0532 02/07/25  0539     --    K 4.3  --      --    CO2 24  --    ANIONGAP 9  --    BUN 15  --    CREATININE 0.9  --    *  --    CALCIUM 8.8  --    PH  --  7.408   MG 1.8  --    ALBUMIN 4.0  --    PROT 6.7  --    ALKPHOS 79  --    ALT 59*  --    AST 24  --    BILITOT 0.9  --        CARDIAC PROFILE LAST 3 DAYS  Recent Labs   Lab 02/07/25  0532 02/07/25  0537   BNP  --  292*   TROPONINIHS 9.1  --        ENDOCRINE LAST 3 DAYS  No results for input(s): "TSH", "PROCAL" in the last 168 hours.    LAST ARTERIAL BLOOD GAS  ABG  Recent Labs   Lab 02/07/25  0539   PH 7.408   PO2 68*   PCO2 37.3   HCO3 23.6* "   BE -1       LAST 7 DAYS MICROBIOLOGY   Microbiology Results (last 7 days)       ** No results found for the last 168 hours. **            MOST RECENT IMAGING  X-Ray Chest AP Portable  Narrative: EXAMINATION:  XR CHEST AP PORTABLE    CLINICAL HISTORY:  SOB;    COMPARISON:  2024    FINDINGS:  AP view shows the heart to be upper normal in size.  The mediastinum is unremarkable.  Pulmonary vasculature and interstitial markings are prominent.  No pleural effusion is identified.  No acute osseous abnormalities are demonstrated.  Impression: 1. Radiographic findings compatible with mild CHF/volume overload    Electronically signed by: Farooq Menjivar  Date:    02/07/2025  Time:    07:00      ECHOCARDIOGRAM RESULTS (last 5)  Results for orders placed during the hospital encounter of 07/02/24    Echo    Interpretation Summary    Left Ventricle: The left ventricle is normal in size. Mildly increased wall thickness. There is mild concentric hypertrophy. Normal wall motion. There is normal systolic function with a visually estimated ejection fraction of 55 - 60%. There is normal diastolic function.    Right Ventricle: Normal right ventricular cavity size. Wall thickness is normal. Systolic function is normal.    Left Atrium: Left atrium is moderately dilated.    Aortic Valve: The aortic valve is a trileaflet valve. There is moderate aortic valve sclerosis.    Mitral Valve: There is mild posterior leaflet sclerosis.    Tricuspid Valve: There is mild to moderate regurgitation with a centrally directed jet.    Pulmonary Artery: The estimated pulmonary artery systolic pressure is 33 mmHg.    IVC/SVC: Normal venous pressure at 3 mmHg.      Results for orders placed during the hospital encounter of 10/25/21    Transesophageal echo (EAMON) with possible cardioversion    Interpretation Summary  · The left ventricle is mildly enlarged with concentric remodeling and moderately decreased systolic function.  · Left ventricular diastolic  dysfunction.  · There is severe left ventricular global hypokinesis.  · Mild right ventricular enlargement with normal right ventricular systolic function.  · Severe left atrial enlargement.  · Moderate right atrial enlargement.  · Moderate mitral regurgitation.  · Moderate tricuspid regurgitation.  · No interatrial septal defect present.  · Normal appearing left atrial appendage. No thrombus is present in the appendage. JACI occluder is absent. Abnormal appendage velocities.  · The estimated ejection fraction is 30%.  · A 150 J synchronized cardioversion was unsuccessful without restoration of normal sinus rhythm.  · A 200 J synchronized cardioversion was unsuccessful without restoration of normal sinus rhythm.  · A 200 J restored sinus rhythm with early recurrence of atrial fibrillation (ERAF).      Echo    Interpretation Summary  · The left ventricle is normal in size with moderate concentric hypertrophy and  · The estimated ejection fraction is 29%.  · There is moderate left ventricular global hypokinesis.  · Atrial fibrillation observed with restrictive filling pattern present.  · With elevated left atrial pressure.  · Normal right ventricular size with normal right ventricular systolic function.  · Mild left atrial enlargement.  · There is moderate aortic valve stenosis.  · Aortic valve area is 1.38 cm2; peak velocity is 1.53 m/s; mean gradient is 6 mmHg.  · Mild tricuspid regurgitation.  · Normal central venous pressure (3 mmHg).  · The estimated PA systolic pressure is 25 mmHg.      Results for orders placed in visit on 03/16/21    Echo Color Flow Doppler? Yes    Interpretation Summary  · The estimated PA systolic pressure is 30 mmHg.  · The left ventricle is normal in size with mild concentric hypertrophy and normal systolic function. The estimated ejection fraction is 65%  · Indeterminate left ventricular diastolic function.  · Normal right ventricular size with normal right ventricular systolic  function.  · Moderate left atrial enlargement.  · Mild to moderate tricuspid regurgitation.  · Mild aortic regurgitation.      CURRENT/PREVIOUS VISIT EKG  Results for orders placed or performed during the hospital encounter of 02/07/25   EKG 12-lead    Collection Time: 02/07/25  5:21 AM   Result Value Ref Range    QRS Duration 68 ms    O[facility] QTC Calculation 418 ms    Narrative    Test Reason : R07.9,    Vent. Rate : 189 BPM     Atrial Rate : 202 BPM     P-R Int :    ms          QRS Dur :  68 ms      QT Int : 236 ms       P-R-T Axes :     38  46 degrees    QTcB Int : 418 ms    Atrial fibrillation with rapid ventricular response  Septal infarct (cited on or before 25-Oct-2021)  Abnormal ECG  When compared with ECG of 15-Feb-2024 20:31,  Vent. rate has increased by 100 bpm    Referred By: AAAREFERRAL SELF           Confirmed By:            ASSESSMENT/PLAN:     Active Hospital Problems    Diagnosis    *Acute pulmonary edema    Type 2 diabetes mellitus with both eyes affected by mild nonproliferative retinopathy without macular edema, without long-term current use of insulin    Atrial fibrillation with RVR    Obstructive sleep apnea syndrome    Hypertension       ASSESSMENT & PLAN:     Acute Pulmonary Edema  AFIB with RVR  HFpEF  KATLYN  HTN  DM Type 2      RECOMMENDATIONS:    ECHO  Continue to diurese with IV Lasix 40 mg  Rate control with cardizem drip for now  Lovenox 1mg/kg q 12  Has not taken her medications for about 2 weeks  Goal would be rate control for now and consider EAMON/CV when more stable from respiratory standpoint      BLAKE Castro-ACNP-BC, CVNP-BC  Department of Cardiology  Date of Service: 02/07/2025        I have personally interviewed and examined the patient, I have reviewed the Nurse Practitioner's history and physical, assessment, and plan. I have personally evaluated the patient at bedside and agree with the findings and made appropriate changes as necessary in recommendations.  All pertinent  recent labs, imaging and EKGs independently reviewed and interpreted.     Estela Tejada MD [facility]  Department of Cardiology  Formerly [facility]  2/7/25

## 2025-02-07 NOTE — HPI
Patient with atrial fibrillation, CHF, type 2 diabetes, hypertension presented with worsening shortness of breath over the last several days.  Patient reports she has been out of her medication for the last 2 weeks.  Her insurance kicked in the 1st of month.  Reports gradual progressive shortness of breath.  Reportedly was hypoxic in the emergency room, placed on oxygen.  Worsening shortness of breath so BiPAP was initiated.  Also developed atrial fibrillation with RVR, given amiodarone bolus without any improvement.  Started on Cardizem drip.  Cardiology consulted.  Chest x-ray showing fluid overload.  Received Lasix in the emergency room.  .

## 2025-02-07 NOTE — PLAN OF CARE
02/07/25 0539   Patient Assessment/Suction   Level of Consciousness (AVPU) alert   Respiratory Effort Moderate;Short of breath   Expansion/Accessory Muscles/Retractions abdominal muscle use   All Lung Fields Breath Sounds crackles, fine   Rhythm/Pattern, Respiratory assisted mechanically;tachypneic   Cough Frequency frequent   Skin Integrity   $ Wound Care Tech Time 15 min   Area Observed Bridge of nose   Skin Appearance without discoloration   PRE-TX-O2   Device (Oxygen Therapy) BIPAP   $ Is the patient on High Flow Oxygen? Yes   Oxygen Concentration (%) 30   SpO2 96 %   Pulse Oximetry Type Continuous   $ Pulse Oximetry - Multiple Charge Pulse Oximetry - Multiple   Pulse (!) 136   Resp (!) 29   /86   Respiratory Interventions   NPPV/CPAP Maintenance adjusted;full face mask;PAP initiated;PAP titrated for comfort;proper fit/secure;skin (device) pressure points assessed;skin-to-device protection utilized   Ready to Wean/Extubation Screen   FIO2<=50 (chart decimal) 0.3   Preset CPAP/BiPAP Settings   Mode Of Delivery BiPAP S/T   $ CPAP/BiPAP Daily Charge 1   CPAP/BIPAP charged w/in last 24 h YES   $ Initial CPAP/BiPAP Setup? Yes   $ Is patient using? Yes   Size of Mask Medium/Large   Sized Appropriately? Yes   Equipment Type V60   Airway Device Type medium full face mask   Humidifier not applicable   Ipap 14   EPAP (cm H2O) 8   Pressure Support (cm H2O) 6   Set Rate (Breaths/Min) 15   ITime (sec) 0.9   Rise Time (sec) 3   Patient CPAP/BiPAP Settings   Timed Inspiration (Sec) 0.9   IPAP Rise Time (sec) 3   RR Total (Breaths/Min) 26   Tidal Volume (mL) 590   VE Minute Ventilation (L/min) 12 L/min   Peak Inspiratory Pressure (cm H2O) 16   TiTOT (%) 30   Total Leak (L/Min) 12   Patient Trigger - ST Mode Only (%) 98   Education   $ Education BiPAP;15 min   Labs   $ Was an ABG obtained? Venous Line;POCT - Blood gas;POCT - Calcium;POCT - Hematocrit;POCT - PH, Blood;POCT - Potassium;POCT - Sodium   $ Labs Tech Time 15  min   Critical Value Communication   Date Result Received 02/07/25   Time Result Received 0539   Resulting Department of Critical Value resp   Who communicated critical value from resulting department? cbabb   Critical Test #1 po2   Name of Notified Physician/Designee dr louise   Date Notified 02/07/25   Time Notified 0540   Read Back Verification Yes

## 2025-02-07 NOTE — CARE UPDATE
02/07/25 0734   Patient Assessment/Suction   Respiratory Effort Unlabored   Expansion/Accessory Muscles/Retractions expansion symmetric   Skin Integrity   $ Wound Care Tech Time 15 min   Area Observed Bridge of nose   Skin Appearance without discoloration   PRE-TX-O2   Device (Oxygen Therapy) BIPAP   $ Is the patient on High Flow Oxygen? Yes   Oxygen Concentration (%) 30   SpO2 96 %   Pulse Oximetry Type Continuous   $ Pulse Oximetry - Multiple Charge Pulse Oximetry - Multiple   Pulse (!) 116   Resp 15   BP (!) 156/117   Ready to Wean/Extubation Screen   FIO2<=50 (chart decimal) 0.3   Preset CPAP/BiPAP Settings   Mode Of Delivery BiPAP S/T   CPAP/BIPAP charged w/in last 24 h YES   $ Is patient using? Yes   Size of Mask Small   Sized Appropriately? Yes   Equipment Type V60   Airway Device Type small full face mask   Humidifier not applicable   Ipap 16   EPAP (cm H2O) 8   Pressure Support (cm H2O) 8   Set Rate (Breaths/Min) 12   ITime (sec) 0.85   Rise Time (sec) 3   Patient CPAP/BiPAP Settings   RR Total (Breaths/Min) 16   Tidal Volume (mL) 656   VE Minute Ventilation (L/min) 9.6 L/min   Peak Inspiratory Pressure (cm H2O) 17   TiTOT (%) 23   Total Leak (L/Min) 7   Patient Trigger - ST Mode Only (%) 94   CPAP/BiPAP Alarms   High Pressure (cm H2O) 60   High RR (breaths/min) 45   Low RR (breaths/min) 5   Apnea (Sec) 20   Education   $ Education BiPAP;15 min

## 2025-02-07 NOTE — ED PROVIDER NOTES
Encounter Date: 2/7/2025       History     Chief Complaint   Patient presents with    Shortness of Breath     Pt says she is out of her meds and that she has been SOB x2 days.  Pt reports no home O2.  Pt hx of COPD, CHF, and A-fib     HPI    69-year-old female with a past medical history of COPD, heart failure (though last TTE showed an EF of 55-60%), atrial fibrillation, hypertension, aortic stenosis presents to the emergency department with concerns of shortness for breath that has been present for the past 2 days.  Patient states she has been out of her medication for the past 2 weeks.  She denies any cough, runny nose, fever, chills, chest pain, nausea, vomiting, lower extremity edema.  She states that she feels as though she is having a heart failure exacerbation.      Review of patient's allergies indicates:  No Known Allergies  Past Medical History:   Diagnosis Date    Cigarette nicotine dependence     Hypertension     Obesity     Vitamin D deficiency      Past Surgical History:   Procedure Laterality Date    BREAST SURGERY      knot on right    HYSTERECTOMY      TONSILLECTOMY       Family History   Problem Relation Name Age of Onset    Heart disease Mother      Heart disease Father      COPD Father      Breast cancer Maternal Grandmother       Social History     Tobacco Use    Smoking status: Every Day     Current packs/day: 1.00     Average packs/day: 1 pack/day for 39.0 years (39.0 ttl pk-yrs)     Types: Cigarettes    Smokeless tobacco: Never   Substance Use Topics    Alcohol use: Not Currently    Drug use: Not Currently     Review of Systems   Constitutional:  Negative for fever.   HENT:  Negative for sore throat.    Respiratory:  Positive for shortness of breath.    Cardiovascular:  Negative for chest pain and leg swelling.   Gastrointestinal:  Negative for nausea and vomiting.   Genitourinary:  Negative for dysuria.   Musculoskeletal:  Negative for back pain.   Skin:  Negative for rash.   Neurological:   Negative for weakness.   Hematological:  Does not bruise/bleed easily.       Physical Exam     Initial Vitals   BP Pulse Resp Temp SpO2   02/07/25 0525 02/07/25 0514 02/07/25 0514 -- 02/07/25 0514   (!) 177/116 (!) 165 (!) 32  (!) 94 %      MAP       --                Physical Exam    Nursing note and vitals reviewed.  Constitutional:  Non-toxic appearance.   Ill-appearing female sitting in exam room bed   HENT:   Head: Normocephalic and atraumatic.   Eyes: EOM are normal. Pupils are equal, round, and reactive to light.   Neck: Neck supple.   Normal range of motion.  Cardiovascular:  Regular rhythm.           Tachycardic, heart rate up to the 190s AFib RVR noted on the monitor  On bedside ultrasound patient is with EF it appears to be 55%.  No right ventricular enlargement appreciated  No D side on parasternal short  IVC distended with minimal change with respiratory variation   Pulmonary/Chest:   Crackles bilateral lower bases  Tachypneic with a respiratory rate in the 40s  Tripoding initially  On bedside ultrasound patient has been lines bilaterally   Abdominal: Abdomen is soft. There is no abdominal tenderness.   Musculoskeletal:      Cervical back: Normal range of motion and neck supple.     Neurological: She is alert and oriented to person, place, and time.   Skin: Skin is warm and dry.         ED Course   Procedures  Labs Reviewed   CBC W/ AUTO DIFFERENTIAL - Abnormal       Result Value    WBC 13.09 (*)     RBC 4.86      Hemoglobin 14.6      Hematocrit 45.7      MCV 94      MCH 30.0      MCHC 31.9 (*)     RDW 14.3      Platelets 267      MPV 11.8      Immature Granulocytes 0.3      Gran # (ANC) 9.6 (*)     Immature Grans (Abs) 0.04      Lymph # 2.3      Mono # 1.0      Eos # 0.1      Baso # 0.07      nRBC 0      Gran % 73.2 (*)     Lymph % 17.6 (*)     Mono % 7.6      Eosinophil % 0.8      Basophil % 0.5      Differential Method Automated      Narrative:     Release to patient->Immediate   COMPREHENSIVE  METABOLIC PANEL - Abnormal    Sodium 142      Potassium 4.3      Chloride 109      CO2 24      Glucose 157 (*)     BUN 15      Creatinine 0.9      Calcium 8.8      Total Protein 6.7      Albumin 4.0      Total Bilirubin 0.9      Alkaline Phosphatase 79      AST 24      ALT 59 (*)     eGFR >60.0      Anion Gap 9      Narrative:     Release to patient->Immediate   B-TYPE NATRIURETIC PEPTIDE - Abnormal     (*)     Narrative:     Release to patient->Immediate   POCT GLUCOSE - Abnormal    POCT Glucose 157 (*)    ISTAT PROCEDURE - Abnormal    POC PH 7.408      POC PCO2 37.3      POC PO2 68 (*)     POC HCO3 23.6 (*)     POC BE -1      POC SATURATED O2 94      POC Glucose 159 (*)     POC Sodium 142      POC Potassium 4.6      POC TCO2 25      POC Ionized Calcium 1.13      POC Hematocrit 45      Rate 15      Sample VENOUS      Site Other      Allens Test N/A      DelSys CPAP/BiPAP      Mode BiPAP      FiO2 30      Spont Rate 26      Min Vol 14.8      Sp02 96      IP 14      EP 8     MAGNESIUM    Magnesium 1.8      Narrative:     Release to patient->Immediate   TROPONIN I HIGH SENSITIVITY    Troponin I High Sensitivity 9.1      Narrative:     Release to patient->Immediate   PROTIME-INR    Prothrombin Time 11.9      INR 1.1      Narrative:     Release to patient->Immediate   HEPATITIS C ANTIBODY   HIV 1 / 2 ANTIBODY   TROPONIN I HIGH SENSITIVITY   INFLUENZA A AND B ANTIGEN   SARS-COV-2 RNA AMPLIFICATION, QUAL          Imaging Results              X-Ray Chest AP Portable (In process)                      Medications   nitroGLYCERIN in 5 % dextrose 50 mg/250 mL (200 mcg/mL) infusion (50 mcg/min Intravenous Back Association 2/7/25 0545)   amiodarone 360 mg/200 mL (1.8 mg/mL) infusion (1 mg/min Intravenous New Bag 2/7/25 0559)   amiodarone 360 mg/200 mL (1.8 mg/mL) infusion (has no administration in time range)   droPERidol injection 0.625 mg (has no administration in time range)   amiodarone in dextrose 150 mg/100 mL (1.5  "mg/mL) loading dose 150 mg (0 mg Intravenous Stopped 2/7/25 7544)   furosemide injection 80 mg (80 mg Intravenous Given 2/7/25 8403)     Medical Decision Making  Amount and/or Complexity of Data Reviewed  Labs: ordered. Decision-making details documented in ED Course.  Radiology: ordered.    Risk  Prescription drug management.    In brief, 69-year-old female with past medical history of heart failure, COPD, AFib RVR presents emergency department with concerns of 2 day episode of shortness for breath in the setting of being out of her medication over the course of the past 2 weeks.  No associated chest pain, nausea, vomiting, lower extremity edema, fever or chills.  States this feels like her prior heart failure exacerbations.  Initial vital signs significant for heart rate of 184, respiratory rate in the 30s to 40s and hypertension with blood pressure of 177/116.  Physical examination as stated above.    BP (!) 177/116   Pulse (!) 184   Resp (!) 30   Ht 5' 2" (1.575 m)   Wt 89 kg (196 lb 4.8 oz)   SpO2 97%   BMI 35.90 kg/m²     Differentials:  COPD exacerbation, heart failure exacerbation, electrolyte abnormality, metabolic derangement, pneumothorax, pulmonary embolism, ACS, arrhythmia, hypertensive emergency, among others.    Orders:  CBC, CMP, troponin, PT INR, magnesium, VBG with electrolytes, BNP, point of care glucose, chest x-ray, EKG     EKG:  On my independent interpretation patient's EKG returned AFib RVR with a heart rate of 177 beats per minute without acute ST elevation or depression.  Normal axis.    Chest x-ray:  Please refer to ED course    Results:  Please refer to ED course     Interventions:  Lasix, Amaryl, BiPAP, nitro drip    Plan:  Patient concerning for we will hypertensive emergency with flash pulmonary edema versus SCAPE.  Also concern for AFib RVR requiring amiodarone.  Please refer to ED course for remainder patient's stay.  Discussed this with Dr. Montgomery.    This text was " transcribed using voice software.    MDM MATRIX SUMMARY    Total Critical Care Time spent by me on this patient's direct and individual: 60 minutes exclusive of separately billable procedures.  Hypertensive emergency with flash pulm requiring BiPAP, AFib RVR requiring amiodarone drip    I was personally present and immediately available for the duration of critical care time as documented. Critical care time included direct bedside care, consultation with specialists and family members, ordering and reviewing test results and documentation and research of the patient's medical record.    Vimal Resendez MD  PGY-4 [facility]  5:50 AM 2/7/2025             ED Course as of 02/07/25 0616   Fri Feb 07, 2025   0543 She reports feeling better her work of breathing a significantly improved, she has been on the nitro for not that long but her blood pressure is now 120s, we have had the nitro, she is still tachycardic, we will give him amio [IC]   0550 CBC auto differential(!)  On my independent interpretation patient has a elevated white blood cell count at 13.09 otherwise within normal limits. [SB]   0550 ISTAT PROCEDURE(!!)  VBG pH of 7.4, CO2 of 37 and bicarb of 23.6 [SB]   0604 Magnesium  Independently interpreted by me, returned at 1.8 [SB]   0612 Chest x-ray on my independent interpretation diffuse interstitial opacities [IC]   0613 Brain natriuretic peptide(!)  Mildly elevated at 292. [SB]   0613 Troponin I High Sensitivity #1  Within normal limits at 9.0 [SB]   0614 Consulted to hospitalist service for admission. [SB]      ED Course User Index  [IC] Kranthi Montgomery MD  [SB] Vimal Resendez MD                             Clinical Impression:  Final diagnoses:  [R07.9] Chest pain  [R06.02] SOB (shortness of breath)  [J81.0] Acute pulmonary edema (Primary)  [R06.03] Respiratory distress  [J96.01] Acute respiratory failure with hypoxia          ED Disposition Condition    Admit Stable                Dipti  MD Vimal  Resident  02/07/25 0616

## 2025-02-07 NOTE — ASSESSMENT & PLAN NOTE
Patient's blood pressure range in the last 24 hours was: BP  Min: 118/86  Max: 184/110.The patient's inpatient anti-hypertensive regimen is listed below:  Current Antihypertensives  nitroGLYCERIN in 5 % dextrose 50 mg/250 mL (200 mcg/mL) infusion, Continuous, Intravenous  nitroGLYCERIN in 5 % dextrose 50 mg/250 mL (200 mcg/mL) infusion, ,   diltiaZEM 100 mg in dextrose 5% 100 mL IVPB (ready to mix) (titrating), Continuous, Intravenous  furosemide injection 40 mg, Every 12 hours, Intravenous    Plan  - BP is controlled, no changes needed to their regimen  - wean nitroglycerin drip, resume home blood pressure medications when able

## 2025-02-07 NOTE — H&P
[facility] - Emergency Dept  Hospital Medicine  History & Physical    Patient Name: Courtney Nino  MRN: [id number]  Patient Class: IP- Inpatient  Admission Date: 2/7/2025  Attending Physician: Zehra Oviedo MD   Primary Care Provider: Jerrod Stubbs MD         Patient information was obtained from spouse/SO and ER records.     Subjective:     Principal Problem:Acute pulmonary edema    Chief Complaint:   Chief Complaint   Patient presents with    Shortness of Breath     Pt says she is out of her meds and that she has been SOB x2 days.  Pt reports no home O2.  Pt hx of COPD, CHF, and A-fib        HPI: Patient with atrial fibrillation, CHF, type 2 diabetes, hypertension presented with worsening shortness of breath over the last several days.  Patient reports she has been out of her medication for the last 2 weeks.  Her insurance kicked in the 1st of month.  Reports gradual progressive shortness of breath.  Reportedly was hypoxic in the emergency room, placed on oxygen.  Worsening shortness of breath so BiPAP was initiated.  Also developed atrial fibrillation with RVR, given amiodarone bolus without any improvement.  Started on Cardizem drip.  Cardiology consulted.  Chest x-ray showing fluid overload.  Received Lasix in the emergency room.  .    Past Medical History:   Diagnosis Date    Cigarette nicotine dependence     Hypertension     Obesity     Vitamin D deficiency        Past Surgical History:   Procedure Laterality Date    BREAST SURGERY      knot on right    HYSTERECTOMY      TONSILLECTOMY         Review of patient's allergies indicates:  No Known Allergies    No current facility-administered medications on file prior to encounter.     Current Outpatient Medications on File Prior to Encounter   Medication Sig    aspirin 325 MG tablet Take 325 mg by mouth once daily.    blood-glucose meter kit To check BG 2 times daily, to use with insurance preferred meter    calcium  carbonate-vitamin D3 (LIQUID CALCIUM WITH VITAMIN D) 600 mg-5 mcg (200 unit) Cap Take 1 capsule by mouth 2 (two) times daily. for 365 doses    digoxin (LANOXIN) 125 mcg tablet Take 1 tablet (0.125 mg total) by mouth once daily.    diltiaZEM (CARDIZEM CD) 360 MG 24 hr capsule Take 1 capsule (360 mg total) by mouth once daily.    DROPSAFE ALCOHOL PREP PADS PadM USE TOPICALLY AS NEEDED AS DIRECTED    dulaglutide (TRULICITY) 3 mg/0.5 mL pen injector Inject 3 mg into the skin every 7 days.    ELIQUIS 5 mg Tab TAKE 1 TABLET (5 MG TOTAL) BY MOUTH 2 (TWO) TIMES DAILY.    furosemide (LASIX) 40 MG tablet Take 1 tablet (40 mg total) by mouth 2 (two) times a day.    glimepiride (AMARYL) 2 MG tablet TAKE 1 TABLET BEFORE BREAKFAST    ibandronate (BONIVA) 150 mg tablet Take 1 tablet (150 mg total) by mouth every 30 days.    lancets Misc To check BG 2 times daily, to use with insurance preferred meter    losartan (COZAAR) 50 MG tablet TAKE 1 TABLET (50 MG TOTAL) BY MOUTH ONCE DAILY.    magnesium oxide (MAG-OX) 400 mg (241.3 mg magnesium) tablet Take 1 tablet (400 mg total) by mouth 2 (two) times daily.    metFORMIN (GLUCOPHAGE) 500 MG tablet TAKE 1 TABLET TWICE DAILY WITH MEALS    metoprolol succinate (TOPROL-XL) 50 MG 24 hr tablet Take 1 tablet (50 mg total) by mouth once daily.    nitroglycerin (NITROGLYN) 0.2 % ointment Place 1 Package (30,000 mg total) onto the skin every evening. Apply to toes every night    omega-3 fatty acids/fish oil (FISH OIL-OMEGA-3 FATTY ACIDS) 300-1,000 mg capsule Take 1 capsule by mouth once daily.    potassium chloride SA (K-DUR,KLOR-CON) 20 MEQ tablet Take 20 mEq by mouth 2 (two) times daily.    pravastatin (PRAVACHOL) 40 MG tablet TAKE 1 TABLET EVERY DAY    TRUE METRIX GLUCOSE TEST STRIP Strp TEST BLOOD SUGAR TWICE DAILY    TRUEPLUS LANCETS 33 gauge Misc TEST BLOOD SUGAR TWICE DAILY    vitamin E 1000 UNIT capsule Take 1,000 Units by mouth once daily.     Family History       Problem Relation (Age of  Onset)    Breast cancer Maternal Grandmother    COPD Father    Heart disease Mother, Father          Tobacco Use    Smoking status: Every Day     Current packs/day: 1.00     Average packs/day: 1 pack/day for 39.0 years (39.0 ttl pk-yrs)     Types: Cigarettes    Smokeless tobacco: Never   Substance and Sexual Activity    Alcohol use: Not Currently    Drug use: Not Currently    Sexual activity: Not on file     Review of Systems   Constitutional:  Positive for fatigue. Negative for fever.   Respiratory:  Positive for shortness of breath. Negative for cough.    Cardiovascular:  Negative for chest pain and palpitations.   Gastrointestinal:  Negative for abdominal pain.   Musculoskeletal:  Negative for back pain.   Neurological:  Negative for light-headedness and numbness.     Objective:     Vital Signs (Most Recent):  Pulse: (!) 142 (02/07/25 0754)  Resp: 18 (02/07/25 0754)  BP: 128/71 (02/07/25 0754)  SpO2: 96 % (02/07/25 0754) Vital Signs (24h Range):  Pulse:  [116-184] 142  Resp:  [15-32] 18  SpO2:  [94 %-98 %] 96 %  BP: (118-184)/() 128/71     Weight: 89 kg (196 lb 4.8 oz)  Body mass index is 35.9 kg/m².     Physical Exam  Constitutional:       General: She is in acute distress.   Cardiovascular:      Rate and Rhythm: Tachycardia present. Rhythm irregular.   Pulmonary:      Effort: Respiratory distress present.      Breath sounds: No wheezing.   Abdominal:      Palpations: Abdomen is soft.   Musculoskeletal:         General: No swelling.   Skin:     General: Skin is dry.   Neurological:      General: No focal deficit present.      Mental Status: She is alert and oriented to person, place, and time.                Significant Labs: All pertinent labs within the past 24 hours have been reviewed.  CBC:   Recent Labs   Lab 02/07/25  0532 02/07/25  0539   WBC 13.09*  --    HGB 14.6  --    HCT 45.7 45     --      CMP:   Recent Labs   Lab 02/07/25  0532      K 4.3      CO2 24   *   BUN 15    CREATININE 0.9   CALCIUM 8.8   PROT 6.7   ALBUMIN 4.0   BILITOT 0.9   ALKPHOS 79   AST 24   ALT 59*   ANIONGAP 9       Significant Imaging: I have reviewed all pertinent imaging results/findings within the past 24 hours.  Assessment/Plan:     * Acute pulmonary edema  Likely secondary to acute decompensated heart failure 2/2 running out of medications   Continue IV Lasix  Wean nitroglycerin drip   Continue BiPAP as tolerated   Cardiology consulted  Follow up echocardiogram      Acute on chronic diastolic congestive heart failure  Patient has Diastolic (HFpEF) heart failure that is Acute on chronic. On presentation their CHF was decompensated. Evidence of decompensated CHF on presentation includes: edema, dyspnea on exertion (MAHAJAN), and shortness of breath. The etiology of their decompensation is likely running out of medications . Most recent BNP and echo results are listed below.  Recent Labs     02/07/25  0537   *     Latest ECHO  Results for orders placed during the hospital encounter of 07/02/24    Echo    Interpretation Summary    Left Ventricle: The left ventricle is normal in size. Mildly increased wall thickness. There is mild concentric hypertrophy. Normal wall motion. There is normal systolic function with a visually estimated ejection fraction of 55 - 60%. There is normal diastolic function.    Right Ventricle: Normal right ventricular cavity size. Wall thickness is normal. Systolic function is normal.    Left Atrium: Left atrium is moderately dilated.    Aortic Valve: The aortic valve is a trileaflet valve. There is moderate aortic valve sclerosis.    Mitral Valve: There is mild posterior leaflet sclerosis.    Tricuspid Valve: There is mild to moderate regurgitation with a centrally directed jet.    Pulmonary Artery: The estimated pulmonary artery systolic pressure is 33 mmHg.    IVC/SVC: Normal venous pressure at 3 mmHg.    Current Heart Failure Medications  nitroGLYCERIN in 5 % dextrose 50  mg/250 mL (200 mcg/mL) infusion, Continuous, Intravenous  nitroGLYCERIN in 5 % dextrose 50 mg/250 mL (200 mcg/mL) infusion, ,   digoxin tablet 0.125 mg, Daily, Oral  furosemide injection 40 mg, Every 12 hours, Intravenous    Plan  - Monitor strict I&Os and daily weights.    - Place on telemetry  - Low sodium diet  - Place on fluid restriction of 1.5 L.   - Cardiology has been consulted  - The patient's volume status is stable but not at their baseline as indicated by shortness of breath  - continue IV Lasix  Repeat echocardiogram    Type 2 diabetes mellitus with both eyes affected by mild nonproliferative retinopathy without macular edema, without long-term current use of insulin  Patient's FSGs are uncontrolled due to hyperglycemia on current medication regimen.  Last A1c reviewed-   Lab Results   Component Value Date    HGBA1C 10.5 (H) 03/24/2023     Most recent fingerstick glucose reviewed-   Recent Labs   Lab 02/07/25  0542   POCTGLUCOSE 157*     Current correctional scale  Medium  Maintain anti-hyperglycemic dose as follows-   Antihyperglycemics (From admission, onward)      Start     Stop Route Frequency Ordered    02/07/25 0903  insulin aspart U-100 pen 0-10 Units         -- SubQ Before meals & nightly PRN 02/07/25 0803          Hold Oral hypoglycemics while patient is in the hospital.    Atrial fibrillation with RVR  Patient has paroxysmal (<7 days) atrial fibrillation. Patient is currently in atrial fibrillation. [id number] Score: 3. The patients heart rate in the last 24 hours is as follows:  Pulse  Min: 116  Max: 184     Antiarrhythmics  amiodarone 360 mg/200 mL (1.8 mg/mL) infusion, Continuous, Intravenous  amiodarone 360 mg/200 mL (1.8 mg/mL) infusion, Continuous, Intravenous  diltiaZEM 100 mg in dextrose 5% 100 mL IVPB (ready to mix) (titrating), Continuous, Intravenous    Anticoagulants  enoxaparin injection 90 mg, Every 12 hours, Subcutaneous    Plan  - Replete lytes with a goal of K>4, Mg >2  -  Patient is anticoagulated, see medications listed above.  - Patient's afib is currently uncontrolled. Will adjust treatment as follows:  Continue Cardizem drip  - cardiology consulted   Resume digoxin      Obstructive sleep apnea syndrome  Continue BiPAP  Use a CPAP at home      Hypertension  Patient's blood pressure range in the last 24 hours was: BP  Min: 118/86  Max: 184/110.The patient's inpatient anti-hypertensive regimen is listed below:  Current Antihypertensives  nitroGLYCERIN in 5 % dextrose 50 mg/250 mL (200 mcg/mL) infusion, Continuous, Intravenous  nitroGLYCERIN in 5 % dextrose 50 mg/250 mL (200 mcg/mL) infusion, ,   diltiaZEM 100 mg in dextrose 5% 100 mL IVPB (ready to mix) (titrating), Continuous, Intravenous  furosemide injection 40 mg, Every 12 hours, Intravenous    Plan  - BP is controlled, no changes needed to their regimen  - wean nitroglycerin drip, resume home blood pressure medications when able      VTE Risk Mitigation (From admission, onward)           Ordered     enoxaparin injection 90 mg  Every 12 hours         02/07/25 0803     IP VTE HIGH RISK PATIENT  Once         02/07/25 0803     Place sequential compression device  Until discontinued         02/07/25 0803                     Critical care time spent on the evaluation and treatment of severe organ dysfunction, review of pertinent labs and imaging studies, discussions with consulting providers and discussions with patient/family 60 minutes          Zehra Oviedo MD  Department of Hospital Medicine  [facility] - Emergency Dept

## 2025-02-07 NOTE — PROGRESS NOTES
Attempted echo at 850am, HR in the 140's.  Attempted echo at 1230pm, HR still very high.    Will try back when heart rate is lower, consistently below 120.

## 2025-02-07 NOTE — ASSESSMENT & PLAN NOTE
Patient's FSGs are uncontrolled due to hyperglycemia on current medication regimen.  Last A1c reviewed-   Lab Results   Component Value Date    HGBA1C 10.5 (H) 03/24/2023     Most recent fingerstick glucose reviewed-   Recent Labs   Lab 02/07/25  0542   POCTGLUCOSE 157*     Current correctional scale  Medium  Maintain anti-hyperglycemic dose as follows-   Antihyperglycemics (From admission, onward)      Start     Stop Route Frequency Ordered    02/07/25 0903  insulin aspart U-100 pen 0-10 Units         -- SubQ Before meals & nightly PRN 02/07/25 0803          Hold Oral hypoglycemics while patient is in the hospital.

## 2025-02-07 NOTE — ASSESSMENT & PLAN NOTE
Patient has paroxysmal (<7 days) atrial fibrillation. Patient is currently in atrial fibrillation. [id number] Score: 3. The patients heart rate in the last 24 hours is as follows:  Pulse  Min: 116  Max: 184     Antiarrhythmics  amiodarone 360 mg/200 mL (1.8 mg/mL) infusion, Continuous, Intravenous  amiodarone 360 mg/200 mL (1.8 mg/mL) infusion, Continuous, Intravenous  diltiaZEM 100 mg in dextrose 5% 100 mL IVPB (ready to mix) (titrating), Continuous, Intravenous    Anticoagulants  enoxaparin injection 90 mg, Every 12 hours, Subcutaneous    Plan  - Replete lytes with a goal of K>4, Mg >2  - Patient is anticoagulated, see medications listed above.  - Patient's afib is currently uncontrolled. Will adjust treatment as follows:  Continue Cardizem drip  - cardiology consulted   Resume digoxin

## 2025-02-07 NOTE — ASSESSMENT & PLAN NOTE
Likely secondary to acute decompensated heart failure 2/2 running out of medications   Continue IV Lasix  Wean nitroglycerin drip   Continue BiPAP as tolerated   Cardiology consulted  Follow up echocardiogram

## 2025-02-07 NOTE — PLAN OF CARE
[facility] - Emergency Dept  Initial Discharge Assessment       Primary Care Provider: Jerrod Stubbs MD    Admission Diagnosis: Acute pulmonary edema [J81.0]    Assessment completed at bedside with patient. Patient lives with daughter Sarah, who is nok.  Patient is independent at baseline, denies any hh/hd/dme/coumadin.  Patient states she does have a CPAP she uses at night.      Admission Date: 2/7/2025  Expected Discharge Date:     Transition of Care Barriers: None    Payor: [employer] MEDICARE / Plan: [employer] HMO PPO SPECIAL NEEDS / Product Type: Medicare Advantage /     Extended Emergency Contact Information  Primary Emergency Contact: Norah Tobin  Home Phone: [phone]  Mobile Phone: [phone]  Relation: Sister  Preferred language: English   needed? No  Secondary Emergency Contact: Sarah Hardy   [facility]  Home Phone: [phone]  Mobile Phone: [phone]  Relation: Daughter    Discharge Plan A: Home with family  Discharge Plan B: Home      [facility] - [facility] [address]  [address] [address]  Phone: [phone] Fax: [phone]    [facility] - [place]  [address]  [address] [place] [address]  Phone: [phone] Fax: [phone]      Initial Assessment (most recent)       Adult Discharge Assessment - 02/07/25 1356          Discharge Assessment    Assessment Type Discharge Planning Assessment     Confirmed/corrected address, phone number and insurance Yes     Confirmed Demographics Correct on Facesheet     Source of Information patient     When was your last doctors appointment? 08/22/24     Reason For Admission acute pulmonary edema     People in Home child(juan), adult     Facility Arrived From: home     Do you expect to return to your current living situation? Yes     Do you have help at home or someone to help you manage your care at home? Yes     Who are  your caregiver(s) and their phone number(s)? Sarah     Prior to hospitilization cognitive status: Alert/Oriented     Current cognitive status: Alert/Oriented     Walking or Climbing Stairs Difficulty no     Dressing/Bathing Difficulty no     Equipment Currently Used at Home CPAP     Readmission within 30 days? No     Patient currently being followed by outpatient case management? No     Do you currently have service(s) that help you manage your care at home? No     Do you take prescription medications? Yes     Do you have prescription coverage? Yes     Do you have any problems affording any of your prescribed medications? No     Is the patient taking medications as prescribed? yes     How do you get to doctors appointments? family or friend will provide     Are you on dialysis? No     Do you take coumadin? No     Discharge Plan A Home with family     Discharge Plan B Home     DME Needed Upon Discharge  none     Discharge Plan discussed with: Patient     Transition of Care Barriers None

## 2025-02-08 ENCOUNTER — CLINICAL SUPPORT (OUTPATIENT)
Dept: CARDIOLOGY | Facility: HOSPITAL | Age: 70
End: 2025-02-08
Attending: STUDENT IN AN ORGANIZED HEALTH CARE EDUCATION/TRAINING PROGRAM
Payer: MEDICARE

## 2025-02-08 PROBLEM — J81.0 ACUTE PULMONARY EDEMA: Status: RESOLVED | Noted: 2025-02-07 | Resolved: 2025-02-08

## 2025-02-08 PROBLEM — E87.6 HYPOKALEMIA: Status: ACTIVE | Noted: 2025-02-08

## 2025-02-08 LAB
ANION GAP SERPL CALC-SCNC: 8 MMOL/L (ref 8–16)
AORTIC ROOT ANNULUS: 2.8 CM
AORTIC VALVE CUSP SEPERATION: 1 CM
APICAL FOUR CHAMBER EJECTION FRACTION: 54 %
APICAL TWO CHAMBER EJECTION FRACTION: 39 %
AV INDEX (PROSTH): 0.42
AV MEAN GRADIENT: 7 MMHG
AV PEAK GRADIENT: 13 MMHG
AV VALVE AREA BY VELOCITY RATIO: 1.5 CM²
AV VALVE AREA: 1.5 CM²
AV VELOCITY RATIO: 0.44
BASOPHILS # BLD AUTO: 0.08 K/UL (ref 0–0.2)
BASOPHILS NFR BLD: 0.8 % (ref 0–1.9)
BSA FOR ECHO PROCEDURE: 1.98 M2
BUN SERPL-MCNC: 17 MG/DL (ref 8–23)
CALCIUM SERPL-MCNC: 8.2 MG/DL (ref 8.7–10.5)
CHLORIDE SERPL-SCNC: 105 MMOL/L (ref 95–110)
CO2 SERPL-SCNC: 29 MMOL/L (ref 23–29)
CREAT SERPL-MCNC: 0.8 MG/DL (ref 0.5–1.4)
CV ECHO LV RWT: 0.54 CM
DIFFERENTIAL METHOD BLD: NORMAL
DOP CALC AO PEAK VEL: 1.8 M/S
DOP CALC AO VTI: 33.1 CM
DOP CALC LVOT AREA: 3.5 CM2
DOP CALC LVOT DIAMETER: 2.1 CM
DOP CALC LVOT PEAK VEL: 0.8 M/S
DOP CALC LVOT STROKE VOLUME: 48.1 CM3
DOP CALC MV VTI: 27.7 CM
DOP CALCLVOT PEAK VEL VTI: 13.9 CM
E WAVE DECELERATION TIME: 222 MSEC
E/E' RATIO: 16 M/S
ECHO LV POSTERIOR WALL: 1.1 CM (ref 0.6–1.1)
EOSINOPHIL # BLD AUTO: 0.2 K/UL (ref 0–0.5)
EOSINOPHIL NFR BLD: 2.5 % (ref 0–8)
ERYTHROCYTE [DISTWIDTH] IN BLOOD BY AUTOMATED COUNT: 14.3 % (ref 11.5–14.5)
EST. GFR  (NO RACE VARIABLE): >60 ML/MIN/1.73 M^2
FRACTIONAL SHORTENING: 14.6 % (ref 28–44)
GLUCOSE SERPL-MCNC: 109 MG/DL (ref 70–110)
HCT VFR BLD AUTO: 41.1 % (ref 37–48.5)
HGB BLD-MCNC: 13.4 G/DL (ref 12–16)
IMM GRANULOCYTES # BLD AUTO: 0.02 K/UL (ref 0–0.04)
IMM GRANULOCYTES NFR BLD AUTO: 0.2 % (ref 0–0.5)
INTERVENTRICULAR SEPTUM: 1.3 CM (ref 0.6–1.1)
IVC DIAMETER: 2 CM
LEFT INTERNAL DIMENSION IN SYSTOLE: 3.5 CM (ref 2.1–4)
LEFT VENTRICLE DIASTOLIC VOLUME INDEX: 38.65 ML/M2
LEFT VENTRICLE DIASTOLIC VOLUME: 74.2 ML
LEFT VENTRICLE END DIASTOLIC VOLUME APICAL 2 CHAMBER: 37.7 ML
LEFT VENTRICLE END DIASTOLIC VOLUME APICAL 4 CHAMBER: 71.7 ML
LEFT VENTRICLE MASS INDEX: 89.5 G/M2
LEFT VENTRICLE SYSTOLIC VOLUME INDEX: 26.5 ML/M2
LEFT VENTRICLE SYSTOLIC VOLUME: 50.9 ML
LEFT VENTRICULAR INTERNAL DIMENSION IN DIASTOLE: 4.1 CM (ref 3.5–6)
LEFT VENTRICULAR MASS: 171.7 G
LV LATERAL E/E' RATIO: 14.5 M/S
LV SEPTAL E/E' RATIO: 18.1 M/S
LVED V (TEICH): 74.2 ML
LVES V (TEICH): 50.9 ML
LVOT MG: 1 MMHG
LVOT MV: 0.55 CM/S
LYMPHOCYTES # BLD AUTO: 2.5 K/UL (ref 1–4.8)
LYMPHOCYTES NFR BLD: 25.9 % (ref 18–48)
MAGNESIUM SERPL-MCNC: 2 MG/DL (ref 1.6–2.6)
MCH RBC QN AUTO: 30.2 PG (ref 27–31)
MCHC RBC AUTO-ENTMCNC: 32.6 G/DL (ref 32–36)
MCV RBC AUTO: 93 FL (ref 82–98)
MONOCYTES # BLD AUTO: 1 K/UL (ref 0.3–1)
MONOCYTES NFR BLD: 9.9 % (ref 4–15)
MV MEAN GRADIENT: 4 MMHG
MV PEAK E VEL: 1.45 M/S
MV PEAK GRADIENT: 8 MMHG
MV VALVE AREA BY CONTINUITY EQUATION: 1.74 CM2
NEUTROPHILS # BLD AUTO: 5.8 K/UL (ref 1.8–7.7)
NEUTROPHILS NFR BLD: 60.7 % (ref 38–73)
NRBC BLD-RTO: 0 /100 WBC
OHS LV EJECTION FRACTION SIMPSONS BIPLANE MOD: 49 %
OHS QRS DURATION: 68 MS
OHS QTC CALCULATION: 418 MS
PISA MRMAX VEL: 4.44 M/S
PISA TR MAX VEL: 2.9 M/S
PLATELET # BLD AUTO: 261 K/UL (ref 150–450)
PMV BLD AUTO: 11.3 FL (ref 9.2–12.9)
POCT GLUCOSE: 121 MG/DL (ref 70–110)
POCT GLUCOSE: 146 MG/DL (ref 70–110)
POCT GLUCOSE: 156 MG/DL (ref 70–110)
POCT GLUCOSE: 99 MG/DL (ref 70–110)
POTASSIUM SERPL-SCNC: 3.2 MMOL/L (ref 3.5–5.1)
PV MV: 0.73 M/S
PV PEAK GRADIENT: 5 MMHG
PV PEAK VELOCITY: 1.09 M/S
RA PRESSURE ESTIMATED: 3 MMHG
RBC # BLD AUTO: 4.44 M/UL (ref 4–5.4)
RV TB RVSP: 6 MMHG
RV TISSUE DOPPLER FREE WALL SYSTOLIC VELOCITY 1 (APICAL 4 CHAMBER VIEW): 16.4 CM/S
SODIUM SERPL-SCNC: 142 MMOL/L (ref 136–145)
TDI LATERAL: 0.1 M/S
TDI SEPTAL: 0.08 M/S
TDI: 0.09 M/S
TR MAX PG: 34 MMHG
TRICUSPID ANNULAR PLANE SYSTOLIC EXCURSION: 1.8 CM
TV REST PULMONARY ARTERY PRESSURE: 37 MMHG
WBC # BLD AUTO: 9.58 K/UL (ref 3.9–12.7)
Z-SCORE OF LEFT VENTRICULAR DIMENSION IN END DIASTOLE: -2.79
Z-SCORE OF LEFT VENTRICULAR DIMENSION IN END SYSTOLE: 0.38

## 2025-02-08 PROCEDURE — 80048 BASIC METABOLIC PNL TOTAL CA: CPT | Performed by: STUDENT IN AN ORGANIZED HEALTH CARE EDUCATION/TRAINING PROGRAM

## 2025-02-08 PROCEDURE — 93306 TTE W/DOPPLER COMPLETE: CPT

## 2025-02-08 PROCEDURE — 25000003 PHARM REV CODE 250: Performed by: STUDENT IN AN ORGANIZED HEALTH CARE EDUCATION/TRAINING PROGRAM

## 2025-02-08 PROCEDURE — 94761 N-INVAS EAR/PLS OXIMETRY MLT: CPT

## 2025-02-08 PROCEDURE — 99900035 HC TECH TIME PER 15 MIN (STAT)

## 2025-02-08 PROCEDURE — 63600175 PHARM REV CODE 636 W HCPCS: Performed by: STUDENT IN AN ORGANIZED HEALTH CARE EDUCATION/TRAINING PROGRAM

## 2025-02-08 PROCEDURE — 5A09357 ASSISTANCE WITH RESPIRATORY VENTILATION, LESS THAN 24 CONSECUTIVE HOURS, CONTINUOUS POSITIVE AIRWAY PRESSURE: ICD-10-PCS | Performed by: STUDENT IN AN ORGANIZED HEALTH CARE EDUCATION/TRAINING PROGRAM

## 2025-02-08 PROCEDURE — 21000000 HC CCU ICU ROOM CHARGE

## 2025-02-08 PROCEDURE — 93306 TTE W/DOPPLER COMPLETE: CPT | Mod: 26,,, | Performed by: INTERNAL MEDICINE

## 2025-02-08 PROCEDURE — 36415 COLL VENOUS BLD VENIPUNCTURE: CPT | Performed by: STUDENT IN AN ORGANIZED HEALTH CARE EDUCATION/TRAINING PROGRAM

## 2025-02-08 PROCEDURE — 94660 CPAP INITIATION&MGMT: CPT

## 2025-02-08 PROCEDURE — 83735 ASSAY OF MAGNESIUM: CPT | Performed by: STUDENT IN AN ORGANIZED HEALTH CARE EDUCATION/TRAINING PROGRAM

## 2025-02-08 PROCEDURE — 85025 COMPLETE CBC W/AUTO DIFF WBC: CPT | Performed by: STUDENT IN AN ORGANIZED HEALTH CARE EDUCATION/TRAINING PROGRAM

## 2025-02-08 PROCEDURE — 27000221 HC OXYGEN, UP TO 24 HOURS

## 2025-02-08 RX ORDER — FUROSEMIDE 40 MG/1
40 TABLET ORAL 2 TIMES DAILY
Status: DISCONTINUED | OUTPATIENT
Start: 2025-02-09 | End: 2025-02-09

## 2025-02-08 RX ORDER — FUROSEMIDE 10 MG/ML
40 INJECTION INTRAMUSCULAR; INTRAVENOUS EVERY 12 HOURS
Status: COMPLETED | OUTPATIENT
Start: 2025-02-08 | End: 2025-02-08

## 2025-02-08 RX ORDER — METOPROLOL SUCCINATE 50 MG/1
50 TABLET, EXTENDED RELEASE ORAL DAILY
Status: DISCONTINUED | OUTPATIENT
Start: 2025-02-08 | End: 2025-02-11 | Stop reason: HOSPADM

## 2025-02-08 RX ORDER — ASPIRIN 81 MG/1
81 TABLET ORAL DAILY
Status: DISCONTINUED | OUTPATIENT
Start: 2025-02-08 | End: 2025-02-11 | Stop reason: HOSPADM

## 2025-02-08 RX ADMIN — POTASSIUM BICARBONATE 35 MEQ: 391 TABLET, EFFERVESCENT ORAL at 05:02

## 2025-02-08 RX ADMIN — DILTIAZEM HYDROCHLORIDE 90 MG: 60 TABLET, FILM COATED ORAL at 03:02

## 2025-02-08 RX ADMIN — ASPIRIN 81 MG: 81 TABLET, COATED ORAL at 08:02

## 2025-02-08 RX ADMIN — DIGOXIN 0.12 MG: 125 TABLET ORAL at 08:02

## 2025-02-08 RX ADMIN — DEXTROSE MONOHYDRATE 12.5 MG/HR: 50 INJECTION, SOLUTION INTRAVENOUS at 02:02

## 2025-02-08 RX ADMIN — DILTIAZEM HYDROCHLORIDE 90 MG: 60 TABLET, FILM COATED ORAL at 08:02

## 2025-02-08 RX ADMIN — POTASSIUM BICARBONATE 35 MEQ: 391 TABLET, EFFERVESCENT ORAL at 08:02

## 2025-02-08 RX ADMIN — METOPROLOL SUCCINATE 50 MG: 50 TABLET, FILM COATED, EXTENDED RELEASE ORAL at 08:02

## 2025-02-08 RX ADMIN — ENOXAPARIN SODIUM 90 MG: 100 INJECTION SUBCUTANEOUS at 08:02

## 2025-02-08 RX ADMIN — FUROSEMIDE 40 MG: 10 INJECTION, SOLUTION INTRAMUSCULAR; INTRAVENOUS at 08:02

## 2025-02-08 RX ADMIN — PRAVASTATIN SODIUM 40 MG: 40 TABLET ORAL at 08:02

## 2025-02-08 NOTE — ASSESSMENT & PLAN NOTE
Controlled.  Off nitro GTT  -resume home diltiazem and metoprolol  -IV Lasix going to p.o.  -weaning IV diltiazem GTT

## 2025-02-08 NOTE — HOSPITAL COURSE
69-year-old female with PMH hypertension, AFib, CHF, DM, KATLYN on CPAP who was admitted for acute on chronic diastolic heart failure and AFib with RVR.  Has been out of most of her medicines for nearly 2 weeks.  She required BiPAP support.  Was on nitroglycerin GTT for very brief period for hypertension.  Given IV Lasix.  Required diltiazem GTT for rate control.  Resumed her regular home medicines.  Diltiazem GTT weaned off.  Cardiology was consulted.  Later patient was initiated on intravenous amiodarone infusion.  Patient underwent successful DC cardioversion performed by Cardiology on February 11, 2025.

## 2025-02-08 NOTE — ASSESSMENT & PLAN NOTE
Improving  -wean diltiazem GTT  -resume home digoxin, diltiazem, metoprolol  -continue therapeutic Lovenox, can resume home Eliquis on discharge  -telemetry  -cardiology consulted  -optimize electrolytes

## 2025-02-08 NOTE — SUBJECTIVE & OBJECTIVE
Interval History:  Seen on a.m. rounds.  Shortness of breath greatly improving, nearing her baseline.      Objective:     Vital Signs (Most Recent):  Temp: 98.4 °F (36.9 °C) (02/08/25 1100)  Pulse: 81 (02/08/25 1400)  Resp: 18 (02/08/25 1400)  BP: 126/74 (02/08/25 1400)  SpO2: 96 % (02/08/25 1400) Vital Signs (24h Range):  Temp:  [98 °F (36.7 °C)-98.4 °F (36.9 °C)] 98.4 °F (36.9 °C)  Pulse:  [] 81  Resp:  [14-35] 18  SpO2:  [86 %-100 %] 96 %  BP: ()/(55-95) 126/74     Weight: 87.2 kg (192 lb 3.9 oz)  Body mass index is 33 kg/m².    Intake/Output Summary (Last 24 hours) at 2/8/2025 1415  Last data filed at 2/8/2025 1258  Gross per 24 hour   Intake 1400.42 ml   Output 4800 ml   Net -3399.58 ml         Physical Exam  Vitals reviewed.   Constitutional:       General: She is not in acute distress.  HENT:      Head: Normocephalic and atraumatic.   Cardiovascular:      Rate and Rhythm: Tachycardia present. Rhythm irregular.   Pulmonary:      Effort: Pulmonary effort is normal. No respiratory distress.      Breath sounds: Normal breath sounds.   Neurological:      General: No focal deficit present.      Mental Status: She is alert and oriented to person, place, and time. Mental status is at baseline.   Psychiatric:         Mood and Affect: Affect normal.         Behavior: Behavior normal.         Thought Content: Thought content normal.             Significant Labs: All pertinent labs within the past 24 hours have been reviewed.    Significant Imaging: I have reviewed all pertinent imaging results/findings within the past 24 hours.

## 2025-02-08 NOTE — PLAN OF CARE
02/07/25 2012   Patient Assessment/Suction   Level of Consciousness (AVPU) alert   Respiratory Effort Normal;Unlabored   Expansion/Accessory Muscles/Retractions expansion symmetric;no retractions;no use of accessory muscles   Rhythm/Pattern, Respiratory depth regular;pattern regular;unlabored   PRE-TX-O2   Device (Oxygen Therapy) nasal cannula   Oxygen Concentration (%) 2   SpO2 96 %   Pulse Oximetry Type Continuous   $ Pulse Oximetry - Multiple Charge Pulse Oximetry - Multiple   Pulse 105   Resp 18   Ready to Wean/Extubation Screen   FIO2<=50 (chart decimal) 0.02   Preset CPAP/BiPAP Settings   Mode Of Delivery standby   CPAP/BIPAP charged w/in last 24 h YES   $ Is patient using? No/refused   Reason patient is not wearing? Patient refused   Education   $ Education BiPAP;Oxygen;15 min   Respiratory Evaluation   $ Care Plan Tech Time 15 min   $ Respiratory Evaluation Complete   Evaluation For New Orders   Admitting Diagnosis Acute Pulm Edema   Cardiac Diagnosis CHF/Afib   Pulmonary Diagnosis COPD

## 2025-02-08 NOTE — ASSESSMENT & PLAN NOTE
Patient's most recent potassium results are listed below.   Recent Labs     02/07/25  0532 02/08/25  0417   K 4.3 3.2*     Plan  - Replete potassium per protocol  - Monitor potassium Daily  - Patient's hypokalemia is  worse

## 2025-02-08 NOTE — CONSULTS
[facility]  Adult Nutrition  Education Short Note      Nutrition Education    Previous education: yes    Diet at home: Heart Healthy    Written information provided and explained on  Sodium restrictions, fluid restriction of 1500mL  cardiac diet, fluid restriction: 1500, and 2 gram sodium diet diet.     Discussed with: patient    Educational Need? yes    Barriers: none identified    Interventions: Fluid modified diet, Sodium modified diet, and Content related nutrition education    Patient and/or family comprehend instructions: yes    Outcome: Verbalizes understanding     Thanks for the consult!    Geovanna Little RD 02/08/2025 10:41 AM

## 2025-02-08 NOTE — PROGRESS NOTES
[facility]  Progress Note    Patient Name: Courtney Nino  MRN: [id number]  Patient Class: IP- Inpatient   Admission Date: 2/7/2025  Length of Stay: 1 days  Attending Physician: Lars Cordoba MD  Primary Care Provider: Jerrod Stubbs MD        Subjective     Principal Problem:Acute on chronic diastolic congestive heart failure        HPI:  Patient with atrial fibrillation, CHF, type 2 diabetes, hypertension presented with worsening shortness of breath over the last several days.  Patient reports she has been out of her medication for the last 2 weeks.  Her insurance kicked in the 1st of month.  Reports gradual progressive shortness of breath.  Reportedly was hypoxic in the emergency room, placed on oxygen.  Worsening shortness of breath so BiPAP was initiated.  Also developed atrial fibrillation with RVR, given amiodarone bolus without any improvement.  Started on Cardizem drip.  Cardiology consulted.  Chest x-ray showing fluid overload.  Received Lasix in the emergency room.  .    Overview/Hospital Course:  69-year-old female with PMH hypertension, AFib, CHF, DM, KATLYN on CPAP who was admitted for acute on chronic diastolic heart failure and AFib with RVR.  Has been out of most of her medicines for nearly 2 weeks.  She required BiPAP support.  Was on nitroglycerin GTT for very brief period for hypertension.  Given IV Lasix.  Required diltiazem GTT for rate control.  Resumed her regular home medicines.  Cardiology was consulted.      Interval History:  Seen on a.m. rounds.  Shortness of breath greatly improving, nearing her baseline.      Objective:     Vital Signs (Most Recent):  Temp: 98.4 °F (36.9 °C) (02/08/25 1100)  Pulse: 81 (02/08/25 1400)  Resp: 18 (02/08/25 1400)  BP: 126/74 (02/08/25 1400)  SpO2: 96 % (02/08/25 1400) Vital Signs (24h Range):  Temp:  [98 °F (36.7 °C)-98.4 °F (36.9 °C)] 98.4 °F (36.9 °C)  Pulse:  [] 81  Resp:  [14-35] 18  SpO2:  [86 %-100  %] 96 %  BP: ()/(55-95) 126/74     Weight: 87.2 kg (192 lb 3.9 oz)  Body mass index is 33 kg/m².    Intake/Output Summary (Last 24 hours) at 2/8/2025 1415  Last data filed at 2/8/2025 1258  Gross per 24 hour   Intake 1400.42 ml   Output 4800 ml   Net -3399.58 ml         Physical Exam  Vitals reviewed.   Constitutional:       General: She is not in acute distress.  HENT:      Head: Normocephalic and atraumatic.   Cardiovascular:      Rate and Rhythm: Tachycardia present. Rhythm irregular.   Pulmonary:      Effort: Pulmonary effort is normal. No respiratory distress.      Breath sounds: Normal breath sounds.   Neurological:      General: No focal deficit present.      Mental Status: She is alert and oriented to person, place, and time. Mental status is at baseline.   Psychiatric:         Mood and Affect: Affect normal.         Behavior: Behavior normal.         Thought Content: Thought content normal.             Significant Labs: All pertinent labs within the past 24 hours have been reviewed.    Significant Imaging: I have reviewed all pertinent imaging results/findings within the past 24 hours.    Assessment and Plan     * Acute on chronic diastolic congestive heart failure  Nearly resolved.  Still some shortness of breath  -1 more dose IV Lasix 40 mg then go to Lasix 40 mg b.i.d. oral tomorrow  -monitor I/O  -control the Afib  -fluid restrict 1.5 L  -new echo obtained and similar to prior - good EF, unable to assess diastole, other mild-to-moderate findings  -cardiology was consulted    Hypokalemia  Patient's most recent potassium results are listed below.   Recent Labs     02/07/25  0532 02/08/25  0417   K 4.3 3.2*     Plan  - Replete potassium per protocol  - Monitor potassium Daily  - Patient's hypokalemia is  worse    Type 2 diabetes mellitus with both eyes affected by mild nonproliferative retinopathy without macular edema, without long-term current use of insulin  Patient's FSGs are controlled on  current medication regimen.  Last A1c reviewed-   Lab Results   Component Value Date    HGBA1C 5.6 02/07/2025     Most recent fingerstick glucose reviewed-   Recent Labs   Lab 02/07/25  2113 02/08/25  0730 02/08/25  1108   POCTGLUCOSE 127* 99 156*       Current correctional scale  Medium  Maintain anti-hyperglycemic dose as follows-   Antihyperglycemics (From admission, onward)      Start     Stop Route Frequency Ordered    02/07/25 0903  insulin aspart U-100 pen 0-10 Units         -- SubQ Before meals & nightly PRN 02/07/25 0803          Hold Oral hypoglycemics while patient is in the hospital.    Atrial fibrillation with RVR  Improving  -wean diltiazem GTT  -resume home digoxin, diltiazem, metoprolol  -continue therapeutic Lovenox, can resume home Eliquis on discharge  -telemetry  -cardiology consulted  -optimize electrolytes    Obstructive sleep apnea syndrome  Use a CPAP at home  -change BiPAP continuous to nighttime and p.r.n.    Hypertension  Controlled.  Off nitro GTT  -resume home diltiazem and metoprolol  -IV Lasix going to p.o.  -weaning IV diltiazem GTT      VTE Risk Mitigation (From admission, onward)           Ordered     enoxaparin injection 90 mg  Every 12 hours         02/07/25 0803     IP VTE HIGH RISK PATIENT  Once         02/07/25 0803     Place sequential compression device  Until discontinued         02/07/25 0803                    Discharge Planning   NICKO:  2/9    Code Status: Full Code   Medical Readiness for Discharge Date:   Discharge Plan A: Home with family               Lars Cordoba MD  Department of Hospital Medicine   [facility] hospital

## 2025-02-08 NOTE — PLAN OF CARE
Problem: Adult Inpatient Plan of Care  Goal: Plan of Care Review  2/8/2025 0415 by Gayle Craft RN  Outcome: Progressing  2/8/2025 0409 by Gayle Craft RN  Outcome: Progressing  Goal: Patient-Specific Goal (Individualized)  2/8/2025 0415 by Gayle Craft RN  Outcome: Progressing  2/8/2025 0409 by Gayle Craft RN  Outcome: Progressing  Goal: Absence of Hospital-Acquired Illness or Injury  2/8/2025 0415 by Gayle Craft RN  Outcome: Progressing  2/8/2025 0409 by Gayle Craft RN  Outcome: Progressing  Goal: Optimal Comfort and Wellbeing  2/8/2025 0415 by Gayle Craft RN  Outcome: Progressing  2/8/2025 0409 by Gayle Craft RN  Outcome: Progressing  Goal: Readiness for Transition of Care  2/8/2025 0415 by Gayle Craft RN  Outcome: Progressing  2/8/2025 0409 by Gayle Craft RN  Outcome: Progressing     Problem: Diabetes Comorbidity  Goal: Blood Glucose Level Within Targeted Range  2/8/2025 0415 by Gayle Craft RN  Outcome: Progressing  2/8/2025 0409 by Gayle Craft RN  Outcome: Progressing     Problem: Fall Injury Risk  Goal: Absence of Fall and Fall-Related Injury  2/8/2025 0415 by Gayle Craft RN  Outcome: Progressing  2/8/2025 0409 by Gayle Craft RN  Outcome: Progressing

## 2025-02-08 NOTE — RESPIRATORY THERAPY
02/08/25 0704   Patient Assessment/Suction   Level of Consciousness (AVPU) alert   Respiratory Effort Unlabored   PRE-TX-O2   Device (Oxygen Therapy) nasal cannula   $ Is the patient on Low Flow Oxygen? Yes   Flow (L/min) (Oxygen Therapy) 2   SpO2 96 %   Pulse Oximetry Type Continuous   $ Pulse Oximetry - Multiple Charge Pulse Oximetry - Multiple   Pulse 105   Resp (!) 31   /60   Preset CPAP/BiPAP Settings   Mode Of Delivery standby;BiPAP   $ CPAP/BiPAP Daily Charge 1   CPAP/BIPAP charged w/in last 24 h YES   Equipment Type V60

## 2025-02-08 NOTE — ASSESSMENT & PLAN NOTE
Nearly resolved.  Still some shortness of breath  -1 more dose IV Lasix 40 mg then go to Lasix 40 mg b.i.d. oral tomorrow  -monitor I/O  -control the Afib  -fluid restrict 1.5 L  -new echo obtained and similar to prior - good EF, unable to assess diastole, other mild-to-moderate findings  -cardiology was consulted

## 2025-02-08 NOTE — ASSESSMENT & PLAN NOTE
Patient's FSGs are controlled on current medication regimen.  Last A1c reviewed-   Lab Results   Component Value Date    HGBA1C 5.6 02/07/2025     Most recent fingerstick glucose reviewed-   Recent Labs   Lab 02/07/25  2113 02/08/25  0730 02/08/25  1108   POCTGLUCOSE 127* 99 156*       Current correctional scale  Medium  Maintain anti-hyperglycemic dose as follows-   Antihyperglycemics (From admission, onward)      Start     Stop Route Frequency Ordered    02/07/25 0903  insulin aspart U-100 pen 0-10 Units         -- SubQ Before meals & nightly PRN 02/07/25 0803          Hold Oral hypoglycemics while patient is in the hospital.

## 2025-02-09 PROBLEM — E87.6 HYPOKALEMIA: Status: RESOLVED | Noted: 2025-02-08 | Resolved: 2025-02-09

## 2025-02-09 LAB
ANION GAP SERPL CALC-SCNC: 7 MMOL/L (ref 8–16)
BUN SERPL-MCNC: 15 MG/DL (ref 8–23)
CALCIUM SERPL-MCNC: 8.3 MG/DL (ref 8.7–10.5)
CHLORIDE SERPL-SCNC: 105 MMOL/L (ref 95–110)
CO2 SERPL-SCNC: 31 MMOL/L (ref 23–29)
CREAT SERPL-MCNC: 0.8 MG/DL (ref 0.5–1.4)
EST. GFR  (NO RACE VARIABLE): >60 ML/MIN/1.73 M^2
GLUCOSE SERPL-MCNC: 117 MG/DL (ref 70–110)
MAGNESIUM SERPL-MCNC: 2 MG/DL (ref 1.6–2.6)
POCT GLUCOSE: 113 MG/DL (ref 70–110)
POCT GLUCOSE: 115 MG/DL (ref 70–110)
POCT GLUCOSE: 120 MG/DL (ref 70–110)
POTASSIUM SERPL-SCNC: 3.6 MMOL/L (ref 3.5–5.1)
SODIUM SERPL-SCNC: 143 MMOL/L (ref 136–145)

## 2025-02-09 PROCEDURE — 25000003 PHARM REV CODE 250: Performed by: INTERNAL MEDICINE

## 2025-02-09 PROCEDURE — 27000221 HC OXYGEN, UP TO 24 HOURS

## 2025-02-09 PROCEDURE — 83735 ASSAY OF MAGNESIUM: CPT | Performed by: STUDENT IN AN ORGANIZED HEALTH CARE EDUCATION/TRAINING PROGRAM

## 2025-02-09 PROCEDURE — 94660 CPAP INITIATION&MGMT: CPT

## 2025-02-09 PROCEDURE — 94761 N-INVAS EAR/PLS OXIMETRY MLT: CPT

## 2025-02-09 PROCEDURE — 80048 BASIC METABOLIC PNL TOTAL CA: CPT | Performed by: STUDENT IN AN ORGANIZED HEALTH CARE EDUCATION/TRAINING PROGRAM

## 2025-02-09 PROCEDURE — 21000000 HC CCU ICU ROOM CHARGE

## 2025-02-09 PROCEDURE — 36415 COLL VENOUS BLD VENIPUNCTURE: CPT | Performed by: STUDENT IN AN ORGANIZED HEALTH CARE EDUCATION/TRAINING PROGRAM

## 2025-02-09 PROCEDURE — 25000003 PHARM REV CODE 250: Performed by: STUDENT IN AN ORGANIZED HEALTH CARE EDUCATION/TRAINING PROGRAM

## 2025-02-09 PROCEDURE — 63600175 PHARM REV CODE 636 W HCPCS: Performed by: STUDENT IN AN ORGANIZED HEALTH CARE EDUCATION/TRAINING PROGRAM

## 2025-02-09 PROCEDURE — 99900035 HC TECH TIME PER 15 MIN (STAT)

## 2025-02-09 RX ORDER — FUROSEMIDE 40 MG/1
40 TABLET ORAL DAILY
Status: DISCONTINUED | OUTPATIENT
Start: 2025-02-09 | End: 2025-02-11 | Stop reason: HOSPADM

## 2025-02-09 RX ORDER — METOPROLOL SUCCINATE 50 MG/1
50 TABLET, EXTENDED RELEASE ORAL DAILY
Qty: 90 TABLET | Refills: 3 | Status: SHIPPED | OUTPATIENT
Start: 2025-02-09

## 2025-02-09 RX ORDER — FUROSEMIDE 40 MG/1
40 TABLET ORAL EVERY OTHER DAY
Qty: 45 TABLET | Refills: 1 | Status: SHIPPED | OUTPATIENT
Start: 2025-02-09 | End: 2025-02-11 | Stop reason: HOSPADM

## 2025-02-09 RX ORDER — DIGOXIN 125 MCG
0.12 TABLET ORAL DAILY
Qty: 90 TABLET | Refills: 1 | Status: SHIPPED | OUTPATIENT
Start: 2025-02-09

## 2025-02-09 RX ORDER — DILTIAZEM HYDROCHLORIDE 120 MG/1
120 CAPSULE, COATED, EXTENDED RELEASE ORAL DAILY
Qty: 90 CAPSULE | Refills: 1 | Status: SHIPPED | OUTPATIENT
Start: 2025-02-09 | End: 2025-02-09

## 2025-02-09 RX ORDER — METOPROLOL TARTRATE 1 MG/ML
5 INJECTION, SOLUTION INTRAVENOUS EVERY 4 HOURS PRN
Status: DISCONTINUED | OUTPATIENT
Start: 2025-02-09 | End: 2025-02-11 | Stop reason: HOSPADM

## 2025-02-09 RX ORDER — DILTIAZEM HYDROCHLORIDE 120 MG/1
120 CAPSULE, COATED, EXTENDED RELEASE ORAL DAILY
Status: DISCONTINUED | OUTPATIENT
Start: 2025-02-09 | End: 2025-02-10

## 2025-02-09 RX ORDER — ASPIRIN 81 MG/1
81 TABLET ORAL DAILY
Qty: 90 TABLET | Refills: 1 | Status: SHIPPED | OUTPATIENT
Start: 2025-02-09

## 2025-02-09 RX ORDER — DILTIAZEM HYDROCHLORIDE 120 MG/1
120 CAPSULE, COATED, EXTENDED RELEASE ORAL DAILY
Qty: 90 CAPSULE | Refills: 1 | Status: SHIPPED | OUTPATIENT
Start: 2025-02-09 | End: 2025-02-11 | Stop reason: HOSPADM

## 2025-02-09 RX ADMIN — PRAVASTATIN SODIUM 40 MG: 40 TABLET ORAL at 08:02

## 2025-02-09 RX ADMIN — DILTIAZEM HYDROCHLORIDE 120 MG: 120 CAPSULE, COATED, EXTENDED RELEASE ORAL at 08:02

## 2025-02-09 RX ADMIN — FUROSEMIDE 40 MG: 40 TABLET ORAL at 08:02

## 2025-02-09 RX ADMIN — POTASSIUM BICARBONATE 50 MEQ: 978 TABLET, EFFERVESCENT ORAL at 04:02

## 2025-02-09 RX ADMIN — ENOXAPARIN SODIUM 90 MG: 100 INJECTION SUBCUTANEOUS at 08:02

## 2025-02-09 RX ADMIN — METOPROLOL SUCCINATE 50 MG: 50 TABLET, FILM COATED, EXTENDED RELEASE ORAL at 08:02

## 2025-02-09 RX ADMIN — ASPIRIN 81 MG: 81 TABLET, COATED ORAL at 08:02

## 2025-02-09 RX ADMIN — METOROPROLOL TARTRATE 5 MG: 5 INJECTION, SOLUTION INTRAVENOUS at 09:02

## 2025-02-09 RX ADMIN — DIGOXIN 0.12 MG: 125 TABLET ORAL at 08:02

## 2025-02-09 NOTE — ASSESSMENT & PLAN NOTE
Controlled off diltiazem GTT  -continue home digoxin, diltiazem (dose decreased), metoprolol  -continue therapeutic Lovenox, can resume home Eliquis on discharge  -telemetry  -cardiology consulted  -optimize electrolytes

## 2025-02-09 NOTE — PLAN OF CARE
Verify Diltiazem was sent to [facility] per [employer] $ 0.00 charge on insurance and currently in stock[facility] excluding D[facility], in which was out of stock. Informed that pt will stay another day due to still being SOB.       02/09/25 1310   Post-Acute Status   Post-Acute Authorization Medications   Coverage [employer] MANAGED MEDICARE - [facility] - [facility]ITATED   Discharge Delays (!) Patient and Family Barriers   Discharge Plan   Discharge Plan A Home   Discharge Plan B Home

## 2025-02-09 NOTE — SUBJECTIVE & OBJECTIVE
Interval History:  Seen on a.m. rounds.  Was planning for discharge today but her shortness of breath was making her feel too bad so we agreed on 1 more day in hospital and reassessment tomorrow morning.      Objective:     Vital Signs (Most Recent):  Temp: 97.9 °F (36.6 °C) (02/09/25 0701)  Pulse: 72 (02/09/25 1300)  Resp: (!) 22 (02/09/25 1300)  BP: 111/79 (02/09/25 1300)  SpO2: 98 % (02/09/25 1300) Vital Signs (24h Range):  Temp:  [97.6 °F (36.4 °C)-98.1 °F (36.7 °C)] 97.9 °F (36.6 °C)  Pulse:  [70-93] 72  Resp:  [13-24] 22  SpO2:  [83 %-99 %] 98 %  BP: ()/(54-92) 111/79     Weight: 86.6 kg (190 lb 14.7 oz)  Body mass index is 32.77 kg/m².    Intake/Output Summary (Last 24 hours) at 2/9/2025 1411  Last data filed at 2/9/2025 1317  Gross per 24 hour   Intake 1054.06 ml   Output 2200 ml   Net -1145.94 ml         Physical Exam  Vitals reviewed.   Constitutional:       General: She is not in acute distress.  HENT:      Head: Normocephalic and atraumatic.   Cardiovascular:      Rate and Rhythm: Normal rate. Rhythm irregular.   Pulmonary:      Effort: Pulmonary effort is normal. No respiratory distress.      Breath sounds: Normal breath sounds.      Comments: Faint rales at bases  Neurological:      General: No focal deficit present.      Mental Status: She is alert and oriented to person, place, and time. Mental status is at baseline.   Psychiatric:         Mood and Affect: Affect normal.         Behavior: Behavior normal.         Thought Content: Thought content normal.             Significant Labs: All pertinent labs within the past 24 hours have been reviewed.    Significant Imaging: I have reviewed all pertinent imaging results/findings within the past 24 hours.

## 2025-02-09 NOTE — ASSESSMENT & PLAN NOTE
Nearly resolved.  Still some shortness of breath  -continue oral Lasix 40 mg daily, currently good urine output from this  -monitor I/O  -fluid restrict 1.5 L  -new echo obtained and similar to prior - good EF, unable to assess diastole, other mild-to-moderate findings  -cardiology was consulted

## 2025-02-09 NOTE — NURSING
"Patient is requesting to not be discharged today due to "not feeling well" and "still short of breath when she moves around"  Dr. Cordoba notified, discharge orders canceled per MD  "

## 2025-02-09 NOTE — PLAN OF CARE
Problem: Adult Inpatient Plan of Care  Goal: Plan of Care Review  Outcome: Progressing  Goal: Patient-Specific Goal (Individualized)  Outcome: Progressing     Problem: Diabetes Comorbidity  Goal: Blood Glucose Level Within Targeted Range  Outcome: Progressing     Problem: Fall Injury Risk  Goal: Absence of Fall and Fall-Related Injury  Outcome: Progressing     Problem: Cardiac Rhythm Management Device  Goal: Optimal Adjustment to Device  Outcome: Progressing  Goal: Absence of Bleeding  Outcome: Progressing  Goal: Absence of Infection Signs and Symptoms  Outcome: Progressing  Goal: Acceptable Pain Level  Outcome: Progressing  Goal: Effective Oxygenation and Ventilation  Outcome: Progressing

## 2025-02-09 NOTE — ASSESSMENT & PLAN NOTE
Patient's FSGs are controlled on current medication regimen.  Last A1c reviewed-   Lab Results   Component Value Date    HGBA1C 5.6 02/07/2025     Most recent fingerstick glucose reviewed-   Recent Labs   Lab 02/08/25  1532 02/08/25  2038 02/09/25  0811 02/09/25  1223   POCTGLUCOSE 121* 146* 115* 120*       Current correctional scale  Medium  Maintain anti-hyperglycemic dose as follows-   Antihyperglycemics (From admission, onward)    Start     Stop Route Frequency Ordered    02/07/25 0903  insulin aspart U-100 pen 0-10 Units         -- SubQ Before meals & nightly PRN 02/07/25 0803        Hold Oral hypoglycemics while patient is in the hospital.

## 2025-02-09 NOTE — PLAN OF CARE
Chart and discharge orders reviewed.  Patient discharged home with no further case management needs.    Patient cleared for discharge from case management standpoint.       02/09/25 0821   Final Note   Assessment Type Final Discharge Note   Anticipated Discharge Disposition Home   Post-Acute Status   Discharge Delays None known at this time

## 2025-02-09 NOTE — RESPIRATORY THERAPY
02/09/25 0830   Patient Assessment/Suction   Level of Consciousness (AVPU) alert   Respiratory Effort Unlabored   PRE-TX-O2   Device (Oxygen Therapy) nasal cannula   $ Is the patient on Low Flow Oxygen? Yes   Flow (L/min) (Oxygen Therapy) 2   SpO2 96 %   Pulse Oximetry Type Continuous   $ Pulse Oximetry - Multiple Charge Pulse Oximetry - Multiple   Pulse 93   Resp 20   Preset CPAP/BiPAP Settings   Mode Of Delivery standby;BiPAP   $ CPAP/BiPAP Daily Charge 1   CPAP/BIPAP charged w/in last 24 h YES   Equipment Type V60

## 2025-02-09 NOTE — PROGRESS NOTES
[facility]  Progress Note    Patient Name: Courtney Nino  MRN: [id number]  Patient Class: IP- Inpatient   Admission Date: 2/7/2025  Length of Stay: 2 days  Attending Physician: Lars Cordoba MD  Primary Care Provider: Jerrod Stubbs MD        Subjective     Principal Problem:Acute on chronic diastolic congestive heart failure        HPI:  Patient with atrial fibrillation, CHF, type 2 diabetes, hypertension presented with worsening shortness of breath over the last several days.  Patient reports she has been out of her medication for the last 2 weeks.  Her insurance kicked in the 1st of month.  Reports gradual progressive shortness of breath.  Reportedly was hypoxic in the emergency room, placed on oxygen.  Worsening shortness of breath so BiPAP was initiated.  Also developed atrial fibrillation with RVR, given amiodarone bolus without any improvement.  Started on Cardizem drip.  Cardiology consulted.  Chest x-ray showing fluid overload.  Received Lasix in the emergency room.  .    Overview/Hospital Course:  69-year-old female with PMH hypertension, AFib, CHF, DM, KATLYN on CPAP who was admitted for acute on chronic diastolic heart failure and AFib with RVR.  Has been out of most of her medicines for nearly 2 weeks.  She required BiPAP support.  Was on nitroglycerin GTT for very brief period for hypertension.  Given IV Lasix.  Required diltiazem GTT for rate control.  Resumed her regular home medicines.  Diltiazem GTT weaned off.  Cardiology was consulted.      Interval History:  Seen on a.m. rounds.  Was planning for discharge today but her shortness of breath was making her feel too bad so we agreed on 1 more day in hospital and reassessment tomorrow morning.      Objective:     Vital Signs (Most Recent):  Temp: 97.9 °F (36.6 °C) (02/09/25 0701)  Pulse: 72 (02/09/25 1300)  Resp: (!) 22 (02/09/25 1300)  BP: 111/79 (02/09/25 1300)  SpO2: 98 % (02/09/25 1300) Vital Signs  (24h Range):  Temp:  [97.6 °F (36.4 °C)-98.1 °F (36.7 °C)] 97.9 °F (36.6 °C)  Pulse:  [70-93] 72  Resp:  [13-24] 22  SpO2:  [83 %-99 %] 98 %  BP: ()/(54-92) 111/79     Weight: 86.6 kg (190 lb 14.7 oz)  Body mass index is 32.77 kg/m².    Intake/Output Summary (Last 24 hours) at 2/9/2025 1411  Last data filed at 2/9/2025 1317  Gross per 24 hour   Intake 1054.06 ml   Output 2200 ml   Net -1145.94 ml         Physical Exam  Vitals reviewed.   Constitutional:       General: She is not in acute distress.  HENT:      Head: Normocephalic and atraumatic.   Cardiovascular:      Rate and Rhythm: Normal rate. Rhythm irregular.   Pulmonary:      Effort: Pulmonary effort is normal. No respiratory distress.      Breath sounds: Normal breath sounds.      Comments: Faint rales at bases  Neurological:      General: No focal deficit present.      Mental Status: She is alert and oriented to person, place, and time. Mental status is at baseline.   Psychiatric:         Mood and Affect: Affect normal.         Behavior: Behavior normal.         Thought Content: Thought content normal.             Significant Labs: All pertinent labs within the past 24 hours have been reviewed.    Significant Imaging: I have reviewed all pertinent imaging results/findings within the past 24 hours.    Assessment and Plan     * Acute on chronic diastolic congestive heart failure  Nearly resolved.  Still some shortness of breath  -continue oral Lasix 40 mg daily, currently good urine output from this  -monitor I/O  -fluid restrict 1.5 L  -new echo obtained and similar to prior - good EF, unable to assess diastole, other mild-to-moderate findings  -cardiology was consulted    Type 2 diabetes mellitus with both eyes affected by mild nonproliferative retinopathy without macular edema, without long-term current use of insulin  Patient's FSGs are controlled on current medication regimen.  Last A1c reviewed-   Lab Results   Component Value Date    HGBA1C 5.6  02/07/2025     Most recent fingerstick glucose reviewed-   Recent Labs   Lab 02/08/25  1532 02/08/25  2038 02/09/25  0811 02/09/25  1223   POCTGLUCOSE 121* 146* 115* 120*       Current correctional scale  Medium  Maintain anti-hyperglycemic dose as follows-   Antihyperglycemics (From admission, onward)      Start     Stop Route Frequency Ordered    02/07/25 0903  insulin aspart U-100 pen 0-10 Units         -- SubQ Before meals & nightly PRN 02/07/25 0803          Hold Oral hypoglycemics while patient is in the hospital.    Atrial fibrillation with RVR  Controlled off diltiazem GTT  -continue home digoxin, diltiazem (dose decreased), metoprolol  -continue therapeutic Lovenox, can resume home Eliquis on discharge  -telemetry  -cardiology consulted  -optimize electrolytes    Obstructive sleep apnea syndrome  Use a CPAP at home  -BiPAP nighttime and p.r.n.    Hypertension  Controlled.   -continue home diltiazem and metoprolol      VTE Risk Mitigation (From admission, onward)           Ordered     enoxaparin injection 90 mg  Every 12 hours         02/07/25 0803     IP VTE HIGH RISK PATIENT  Once         02/07/25 0803     Place sequential compression device  Until discontinued         02/07/25 0803                    Discharge Planning   NICKO: 2/9/2025     Code Status: Full Code   Medical Readiness for Discharge Date: 2/9/2025  Discharge Plan A: Home   Discharge Delays: (!) Patient and Family Barriers      Hold inpatient 1 more day due to respiratory symptoms still not close enough to her baseline        Lars Cordoba MD  Department of Hospital Medicine   [facility]

## 2025-02-09 NOTE — CARE UPDATE
02/08/25 2030   Patient Assessment/Suction   Level of Consciousness (AVPU) alert   Respiratory Effort Unlabored   Expansion/Accessory Muscles/Retractions no use of accessory muscles   All Lung Fields Breath Sounds clear;coarse   Rhythm/Pattern, Respiratory no shortness of breath reported   Cough Frequency other (see comments)   PRE-TX-O2   Device (Oxygen Therapy) nasal cannula   $ Is the patient on Low Flow Oxygen? Yes   Oxygen Concentration (%) 2   SpO2 97 %   Pulse Oximetry Type Continuous   $ Pulse Oximetry - Multiple Charge Pulse Oximetry - Multiple   Pulse 70   Resp 16   Positioning   Head of Bed (HOB) Positioning HOB elevated;HOB at 30 degrees   Ready to Wean/Extubation Screen   FIO2<=50 (chart decimal) 0.02   Preset CPAP/BiPAP Settings   Mode Of Delivery standby   CPAP/BIPAP charged w/in last 24 h YES   $ Is patient using? No/refused   Reason patient is not wearing? Patient refused

## 2025-02-10 ENCOUNTER — PATIENT OUTREACH (OUTPATIENT)
Dept: ADMINISTRATIVE | Facility: HOSPITAL | Age: 70
End: 2025-02-10
Payer: MEDICARE

## 2025-02-10 LAB
ALBUMIN SERPL BCP-MCNC: 3.9 G/DL (ref 3.5–5.2)
ALP SERPL-CCNC: 79 U/L (ref 55–135)
ALT SERPL W/O P-5'-P-CCNC: 30 U/L (ref 10–44)
ANION GAP SERPL CALC-SCNC: 6 MMOL/L (ref 8–16)
ANION GAP SERPL CALC-SCNC: 8 MMOL/L (ref 8–16)
AST SERPL-CCNC: 19 U/L (ref 10–40)
BILIRUB SERPL-MCNC: 0.4 MG/DL (ref 0.1–1)
BUN SERPL-MCNC: 15 MG/DL (ref 8–23)
BUN SERPL-MCNC: 16 MG/DL (ref 8–23)
CALCIUM SERPL-MCNC: 8.7 MG/DL (ref 8.7–10.5)
CALCIUM SERPL-MCNC: 9.3 MG/DL (ref 8.7–10.5)
CHLORIDE SERPL-SCNC: 105 MMOL/L (ref 95–110)
CHLORIDE SERPL-SCNC: 99 MMOL/L (ref 95–110)
CO2 SERPL-SCNC: 29 MMOL/L (ref 23–29)
CO2 SERPL-SCNC: 32 MMOL/L (ref 23–29)
CREAT SERPL-MCNC: 0.8 MG/DL (ref 0.5–1.4)
CREAT SERPL-MCNC: 0.8 MG/DL (ref 0.5–1.4)
EST. GFR  (NO RACE VARIABLE): >60 ML/MIN/1.73 M^2
EST. GFR  (NO RACE VARIABLE): >60 ML/MIN/1.73 M^2
GLUCOSE SERPL-MCNC: 128 MG/DL (ref 70–110)
GLUCOSE SERPL-MCNC: 90 MG/DL (ref 70–110)
MAGNESIUM SERPL-MCNC: 2 MG/DL (ref 1.6–2.6)
POCT GLUCOSE: 110 MG/DL (ref 70–110)
POCT GLUCOSE: 148 MG/DL (ref 70–110)
POCT GLUCOSE: 165 MG/DL (ref 70–110)
POTASSIUM SERPL-SCNC: 3.8 MMOL/L (ref 3.5–5.1)
POTASSIUM SERPL-SCNC: 4.1 MMOL/L (ref 3.5–5.1)
PROT SERPL-MCNC: 6.7 G/DL (ref 6–8.4)
SODIUM SERPL-SCNC: 139 MMOL/L (ref 136–145)
SODIUM SERPL-SCNC: 140 MMOL/L (ref 136–145)

## 2025-02-10 PROCEDURE — 25000003 PHARM REV CODE 250: Performed by: STUDENT IN AN ORGANIZED HEALTH CARE EDUCATION/TRAINING PROGRAM

## 2025-02-10 PROCEDURE — 63600175 PHARM REV CODE 636 W HCPCS

## 2025-02-10 PROCEDURE — 36415 COLL VENOUS BLD VENIPUNCTURE: CPT | Performed by: INTERNAL MEDICINE

## 2025-02-10 PROCEDURE — 25000003 PHARM REV CODE 250: Performed by: INTERNAL MEDICINE

## 2025-02-10 PROCEDURE — 36415 COLL VENOUS BLD VENIPUNCTURE: CPT | Performed by: STUDENT IN AN ORGANIZED HEALTH CARE EDUCATION/TRAINING PROGRAM

## 2025-02-10 PROCEDURE — 80053 COMPREHEN METABOLIC PANEL: CPT | Performed by: INTERNAL MEDICINE

## 2025-02-10 PROCEDURE — 80048 BASIC METABOLIC PNL TOTAL CA: CPT | Performed by: STUDENT IN AN ORGANIZED HEALTH CARE EDUCATION/TRAINING PROGRAM

## 2025-02-10 PROCEDURE — 94660 CPAP INITIATION&MGMT: CPT

## 2025-02-10 PROCEDURE — 83735 ASSAY OF MAGNESIUM: CPT | Performed by: STUDENT IN AN ORGANIZED HEALTH CARE EDUCATION/TRAINING PROGRAM

## 2025-02-10 PROCEDURE — 21000000 HC CCU ICU ROOM CHARGE

## 2025-02-10 PROCEDURE — 97165 OT EVAL LOW COMPLEX 30 MIN: CPT

## 2025-02-10 PROCEDURE — 97116 GAIT TRAINING THERAPY: CPT

## 2025-02-10 PROCEDURE — 99900031 HC PATIENT EDUCATION (STAT)

## 2025-02-10 PROCEDURE — 97161 PT EVAL LOW COMPLEX 20 MIN: CPT

## 2025-02-10 PROCEDURE — 97535 SELF CARE MNGMENT TRAINING: CPT

## 2025-02-10 PROCEDURE — 94761 N-INVAS EAR/PLS OXIMETRY MLT: CPT

## 2025-02-10 PROCEDURE — 27000221 HC OXYGEN, UP TO 24 HOURS

## 2025-02-10 PROCEDURE — 63600175 PHARM REV CODE 636 W HCPCS: Performed by: STUDENT IN AN ORGANIZED HEALTH CARE EDUCATION/TRAINING PROGRAM

## 2025-02-10 PROCEDURE — 99900035 HC TECH TIME PER 15 MIN (STAT)

## 2025-02-10 PROCEDURE — 82962 GLUCOSE BLOOD TEST: CPT

## 2025-02-10 RX ORDER — DILTIAZEM HYDROCHLORIDE 180 MG/1
360 CAPSULE, COATED, EXTENDED RELEASE ORAL DAILY
Status: DISCONTINUED | OUTPATIENT
Start: 2025-02-11 | End: 2025-02-10

## 2025-02-10 RX ORDER — DILTIAZEM HYDROCHLORIDE 180 MG/1
180 CAPSULE, COATED, EXTENDED RELEASE ORAL DAILY
Status: DISCONTINUED | OUTPATIENT
Start: 2025-02-11 | End: 2025-02-11 | Stop reason: HOSPADM

## 2025-02-10 RX ORDER — LANOLIN ALCOHOL/MO/W.PET/CERES
400 CREAM (GRAM) TOPICAL DAILY
Status: DISCONTINUED | OUTPATIENT
Start: 2025-02-10 | End: 2025-02-11 | Stop reason: HOSPADM

## 2025-02-10 RX ADMIN — APIXABAN 5 MG: 5 TABLET, FILM COATED ORAL at 08:02

## 2025-02-10 RX ADMIN — METOPROLOL SUCCINATE 50 MG: 50 TABLET, FILM COATED, EXTENDED RELEASE ORAL at 08:02

## 2025-02-10 RX ADMIN — ASPIRIN 81 MG: 81 TABLET, COATED ORAL at 08:02

## 2025-02-10 RX ADMIN — DILTIAZEM HYDROCHLORIDE 120 MG: 120 CAPSULE, COATED, EXTENDED RELEASE ORAL at 08:02

## 2025-02-10 RX ADMIN — AMIODARONE HYDROCHLORIDE 0.5 MG/MIN: 1.8 INJECTION, SOLUTION INTRAVENOUS at 05:02

## 2025-02-10 RX ADMIN — Medication 400 MG: at 03:02

## 2025-02-10 RX ADMIN — AMIODARONE HYDROCHLORIDE 1 MG/MIN: 1.8 INJECTION, SOLUTION INTRAVENOUS at 11:02

## 2025-02-10 RX ADMIN — ENOXAPARIN SODIUM 90 MG: 100 INJECTION SUBCUTANEOUS at 08:02

## 2025-02-10 RX ADMIN — FUROSEMIDE 40 MG: 40 TABLET ORAL at 08:02

## 2025-02-10 RX ADMIN — DEXTROSE MONOHYDRATE 2.5 MG/HR: 50 INJECTION, SOLUTION INTRAVENOUS at 03:02

## 2025-02-10 RX ADMIN — DIGOXIN 0.12 MG: 125 TABLET ORAL at 08:02

## 2025-02-10 RX ADMIN — PRAVASTATIN SODIUM 40 MG: 40 TABLET ORAL at 08:02

## 2025-02-10 NOTE — PROGRESS NOTES
Chart review of [facility] Patients with Rising Risk of ED/Admission Report    Pt was seen in ED on 2-7-25 for  Shortness of Breath. Admitted into hospital. Currently still in hospital.

## 2025-02-10 NOTE — PROGRESS NOTES
[facility]  Progress Note    Patient Name: Courtney Nino  MRN: [id number]  Patient Class: IP- Inpatient   Admission Date: 2/7/2025  Length of Stay: 3 days  Attending Physician: Amor Lucero MD  Primary Care Provider: Jerrod Stubbs MD        Subjective     Principal Problem:Acute on chronic diastolic congestive heart failure        HPI:  Patient with atrial fibrillation, CHF, type 2 diabetes, hypertension presented with worsening shortness of breath over the last several days.  Patient reports she has been out of her medication for the last 2 weeks.  Her insurance kicked in the 1st of month.  Reports gradual progressive shortness of breath.  Reportedly was hypoxic in the emergency room, placed on oxygen.  Worsening shortness of breath so BiPAP was initiated.  Also developed atrial fibrillation with RVR, given amiodarone bolus without any improvement.  Started on Cardizem drip.  Cardiology consulted.  Chest x-ray showing fluid overload.  Received Lasix in the emergency room.  .    Overview/Hospital Course:  69-year-old female with PMH hypertension, AFib, CHF, DM, KATLYN on CPAP who was admitted for acute on chronic diastolic heart failure and AFib with RVR.  Has been out of most of her medicines for nearly 2 weeks.  She required BiPAP support.  Was on nitroglycerin GTT for very brief period for hypertension.  Given IV Lasix.  Required diltiazem GTT for rate control.  Resumed her regular home medicines.  Diltiazem GTT weaned off.  Cardiology was consulted.      Interval History:  Patient is seen and examined during multidisciplinary round.  Patient is currently on Cardizem infusion.  Patient denies any chest pain, shortness of breath or any palpitations.      Objective:     Vital Signs (Most Recent):  Temp: 98.2 °F (36.8 °C) (02/10/25 0701)  Pulse: 108 (02/10/25 0701)  Resp: 16 (02/10/25 0701)  BP: (!) 130/96 (02/10/25 0701)  SpO2: 95 % (02/10/25 0701) Vital Signs (24h  Range):  Temp:  [97.9 °F (36.6 °C)-98.2 °F (36.8 °C)] 98.2 °F (36.8 °C)  Pulse:  [] 108  Resp:  [13-37] 16  SpO2:  [83 %-98 %] 95 %  BP: (103-157)/(56-96) 130/96     Weight: 86.6 kg (190 lb 14.7 oz)  Body mass index is 32.77 kg/m².    Intake/Output Summary (Last 24 hours) at 2/10/2025 0825  Last data filed at 2/10/2025 0134  Gross per 24 hour   Intake 890 ml   Output 2000 ml   Net -1110 ml         Physical Exam  Vitals reviewed.   Constitutional:       General: She is not in acute distress.  HENT:      Head: Normocephalic and atraumatic.   Cardiovascular:      Rate and Rhythm: Normal rate. Rhythm irregular.   Pulmonary:      Effort: Pulmonary effort is normal. No respiratory distress.      Breath sounds: Normal breath sounds.      Comments: Faint rales at bases  Neurological:      General: No focal deficit present.      Mental Status: She is alert and oriented to person, place, and time. Mental status is at baseline.   Psychiatric:         Mood and Affect: Affect normal.         Behavior: Behavior normal.         Thought Content: Thought content normal.             Significant Labs:  Lab Results   Component Value Date    WBC 9.58 02/08/2025    HGB 13.4 02/08/2025    HCT 41.1 02/08/2025    MCV 93 02/08/2025     02/08/2025       CMP  Sodium   Date Value Ref Range Status   02/10/2025 140 136 - 145 mmol/L Final     Potassium   Date Value Ref Range Status   02/10/2025 4.1 3.5 - 5.1 mmol/L Final     Chloride   Date Value Ref Range Status   02/10/2025 105 95 - 110 mmol/L Final     CO2   Date Value Ref Range Status   02/10/2025 29 23 - 29 mmol/L Final     Glucose   Date Value Ref Range Status   02/10/2025 90 70 - 110 mg/dL Final     BUN   Date Value Ref Range Status   02/10/2025 16 8 - 23 mg/dL Final     Creatinine   Date Value Ref Range Status   02/10/2025 0.8 0.5 - 1.4 mg/dL Final     Calcium   Date Value Ref Range Status   02/10/2025 8.7 8.7 - 10.5 mg/dL Final     Total Protein   Date Value Ref Range Status    02/07/2025 6.7 6.0 - 8.4 g/dL Final     Albumin   Date Value Ref Range Status   02/07/2025 4.0 3.5 - 5.2 g/dL Final     Total Bilirubin   Date Value Ref Range Status   02/07/2025 0.9 0.1 - 1.0 mg/dL Final     Comment:     For infants and newborns, interpretation of results should be based  on gestational age, weight and in agreement with clinical  observations.    Premature Infant recommended reference ranges:  Up to 24 hours.............<8.0 mg/dL  Up to 48 hours............<12.0 mg/dL  3-5 days..................<15.0 mg/dL  6-29 days.................<15.0 mg/dL       Alkaline Phosphatase   Date Value Ref Range Status   02/07/2025 79 55 - 135 U/L Final     AST   Date Value Ref Range Status   02/07/2025 24 10 - 40 U/L Final     ALT   Date Value Ref Range Status   02/07/2025 59 (H) 10 - 44 U/L Final     Anion Gap   Date Value Ref Range Status   02/10/2025 6 (L) 8 - 16 mmol/L Final     eGFR   Date Value Ref Range Status   02/10/2025 >60.0 >60 mL/min/1.73 m^2 Final     Microbiology Results (last 7 days)       ** No results found for the last 168 hours. **          Significant Imaging:  ECHO:    Left Ventricle: The left ventricle is normal in size. Mildly increased wall thickness. There is mild concentric hypertrophy. Normal wall motion. There is normal systolic function with a visually estimated ejection fraction of 60 - 65%. Unable to assess diastolic function due to atrial fibrillation.    Right Ventricle: Normal right ventricular cavity size. Wall thickness is normal. Systolic function is normal.    Left Atrium: Left atrium is moderately dilated.    Aortic Valve: The aortic valve is a trileaflet valve. There is mild aortic valve sclerosis.    Mitral Valve: There is moderate posterior mitral annular calcification present.    Tricuspid Valve: There is mild to moderate regurgitation with a centrally directed jet.    Pulmonary Artery: There is mild pulmonary hypertension. The estimated pulmonary artery systolic  pressure is 37 mmHg.    IVC/SVC: Normal venous pressure at 3 mmHg.    CXR: Radiographic findings compatible with mild CHF/volume overload       Assessment and Plan     * Acute on chronic diastolic congestive heart failure  Nearly resolved.  Still some shortness of breath  -continue oral Lasix 40 mg daily, currently good urine output from this  -monitor I/O  -fluid restrict 1.5 L  -new echo obtained and similar to prior - good EF, unable to assess diastole, other mild-to-moderate findings  -cardiology was consulted    Type 2 diabetes mellitus with both eyes affected by mild nonproliferative retinopathy without macular edema, without long-term current use of insulin  Patient's FSGs are controlled on current medication regimen.  Last A1c reviewed-   Lab Results   Component Value Date    HGBA1C 5.6 02/07/2025     Most recent fingerstick glucose reviewed-   Recent Labs   Lab 02/09/25  1223 02/09/25  1636   POCTGLUCOSE 120* 113*       Current correctional scale  Medium  Maintain anti-hyperglycemic dose as follows-   Antihyperglycemics (From admission, onward)      Start     Stop Route Frequency Ordered    02/07/25 0903  insulin aspart U-100 pen 0-10 Units         -- SubQ Before meals & nightly PRN 02/07/25 0803          Hold Oral hypoglycemics while patient is in the hospital.    Atrial fibrillation with RVR  Controlled off diltiazem GTT  -continue home digoxin, diltiazem (dose decreased), metoprolol  -continue therapeutic Lovenox, can resume home Eliquis on discharge  -telemetry  -cardiology consulted  -optimize electrolytes    Obstructive sleep apnea syndrome  Use a CPAP at home  -BiPAP nighttime and p.r.n.    Hypertension  Controlled.   -continue home diltiazem and metoprolol    Discussed with bedside nurse and .  VTE Risk Mitigation (From admission, onward)           Ordered     enoxaparin injection 90 mg  Every 12 hours         02/07/25 0803     IP VTE HIGH RISK PATIENT  Once         02/07/25 0803      Place sequential compression device  Until discontinued         02/07/25 0803                Critical care time spent on the evaluation and treatment of severe organ dysfunction, review of pertinent labs and imaging studies, discussions with consulting providers and discussions with patient/family: 35 minutes.      Discharge Planning   NICKO: 2/11/2025     Code Status: Full Code   Medical Readiness for Discharge Date: 2/9/2025  Discharge Plan A: Home   Discharge Delays: (!) Patient and Family Barriers                    Amor Lucero MD  Department of Hospital Medicine   [facility]

## 2025-02-10 NOTE — PLAN OF CARE
02/10/25 0835   Discharge Reassessment   Assessment Type Discharge Planning Reassessment   Post-Acute Status   Hospital Resources/Appts/Education Provided Appointments scheduled and added to AVS

## 2025-02-10 NOTE — PLAN OF CARE
Pt continues to require inpatient medical treatment at this time AEB TTE with cardioversion scheduled for tomorrow. Case management to continue to follow for needs.      02/10/25 1436   Discharge Reassessment   Assessment Type Discharge Planning Reassessment   Did the patient's condition or plan change since previous assessment? No   Discharge Plan discussed with: Patient   Communicated NICKO with patient/caregiver Yes   Discharge Plan A Home   Why the patient remains in the hospital Requires continued medical care

## 2025-02-10 NOTE — PT/OT/SLP EVAL
Physical Therapy Evaluation    Patient Name:  Courtney Nino   MRN:  [id number]    Recommendations:     Discharge Recommendations: No Therapy Indicated   Discharge Equipment Recommendations: walker, rolling   Barriers to discharge:  DME needs, high risk for falls,     Assessment:     Courtney Nino is a 69 y.o. female admitted with a medical diagnosis of Acute on chronic diastolic congestive heart failure.  She presents with the following impairments/functional limitations: weakness, impaired endurance, impaired self care skills, impaired functional mobility, gait instability, impaired balance, decreased lower extremity function, decreased safety awareness, pain, impaired cardiopulmonary response to activity.    Pt found in bed with HOB elevated. Pt agreeable to visit. Pt ambulated 120 ft with IV pole and CGA. Pt agreeable to trial RW at next visit to improve activity tolerance and balance and pt relied on IV pole for balance/stability. Pt tolerates session on RA with SAO2 WFL    Rehab Prognosis: Fair; patient would benefit from acute skilled PT services to address these deficits and reach maximum level of function.    Recent Surgery: * No surgery found *      Plan:     During this hospitalization, patient to be seen 5 x/week to address the identified rehab impairments via gait training, therapeutic activities, therapeutic exercises, neuromuscular re-education and progress toward the following goals:    Plan of Care Expires:  03/10/25    Subjective     Chief Complaint: none stated  Patient/Family Comments/goals: get better  Pain/Comfort:  Pain Rating 1: 0/10    Patients cultural, spiritual, Jehovah's witness conflicts given the current situation: no    Living Environment:  Pt lives with her daughter in a one story home with no KAREEN  Prior to admission, patients level of function was Independent.  Equipment used at home: CPAP.  DME owned (not currently used): none.  Upon discharge, patient will have assistance  from daughter.    Objective:     Communicated with RN prior to session.  Patient found HOB elevated with peripheral IV, telemetry, blood pressure cuff, pulse ox (continuous)  upon PT entry to room.    General Precautions: Standard, fall  Orthopedic Precautions:N/A   Braces: N/A  Respiratory Status: Nasal cannula, flow 2 L/min    Exams:  RLE ROM: WFL  RLE Strength: WFL  LLE ROM: WFL  LLE Strength: WFL    Functional Mobility:  Bed Mobility:     Supine to Sit: supervision  Transfers:     Sit to Stand:  contact guard assistance with no AD  Gait: 15 ft with no AD and min A reaching out to self steady. 105 ft with IV pole and CGA with improved stability      AM-PAC 6 CLICK MOBILITY  Total Score:21       Treatment & Education:  Pt educated on POC, discharge recommendation, importance of time OOB, DME needs, need for assist with mobility, use of call bell to seek assistance as needed and fall prevention      Patient left sitting edge of bed with all lines intact and call button in reach.    GOALS:   Multidisciplinary Problems       Physical Therapy Goals          Problem: Physical Therapy    Goal Priority Disciplines Outcome Interventions   Physical Therapy Goal     PT, PT/OT Progressing    Description: Goals to be met by: 3/10/25     Patient will increase functional independence with mobility by performin. Supine to sit with Supervision  2. Sit to stand transfer with Supervision  3. Bed to chair transfer with Supervision using least restrictive assistive device   4. Gait  x 150 feet with Supervision using least restrictive assistive device                              DME Justifications:   Courtney's mobility limitation cannot be sufficiently resolved by the use of a cane. Her functional mobility deficit can be sufficiently resolved with the use of a Rolling Walker. Patient's mobility limitation significantly impairs their ability to participate in one of more activities of daily living.  The use of a RW will  significantly improve the patient's ability to participate in MRADLS and the patient will use it on regular basis in the home.    History:     Past Medical History:   Diagnosis Date    Cigarette nicotine dependence     Hypertension     Obesity     Vitamin D deficiency        Past Surgical History:   Procedure Laterality Date    BREAST SURGERY      knot on right    HYSTERECTOMY      TONSILLECTOMY         Time Tracking:     PT Received On: 02/10/25  PT Start Time: 1147     PT Stop Time: 1200  PT Total Time (min): 13 min     Billable Minutes: Evaluation 5 and Gait Training 8      02/10/2025

## 2025-02-10 NOTE — SUBJECTIVE & OBJECTIVE
Interval History:  Patient is seen and examined during multidisciplinary round.  Patient is currently on Cardizem infusion.  Patient denies any chest pain, shortness of breath or any palpitations.      Objective:     Vital Signs (Most Recent):  Temp: 98.2 °F (36.8 °C) (02/10/25 0701)  Pulse: 108 (02/10/25 0701)  Resp: 16 (02/10/25 0701)  BP: (!) 130/96 (02/10/25 0701)  SpO2: 95 % (02/10/25 0701) Vital Signs (24h Range):  Temp:  [97.9 °F (36.6 °C)-98.2 °F (36.8 °C)] 98.2 °F (36.8 °C)  Pulse:  [] 108  Resp:  [13-37] 16  SpO2:  [83 %-98 %] 95 %  BP: (103-157)/(56-96) 130/96     Weight: 86.6 kg (190 lb 14.7 oz)  Body mass index is 32.77 kg/m².    Intake/Output Summary (Last 24 hours) at 2/10/2025 0825  Last data filed at 2/10/2025 0134  Gross per 24 hour   Intake 890 ml   Output 2000 ml   Net -1110 ml         Physical Exam  Vitals reviewed.   Constitutional:       General: She is not in acute distress.  HENT:      Head: Normocephalic and atraumatic.   Cardiovascular:      Rate and Rhythm: Normal rate. Rhythm irregular.   Pulmonary:      Effort: Pulmonary effort is normal. No respiratory distress.      Breath sounds: Normal breath sounds.      Comments: Faint rales at bases  Neurological:      General: No focal deficit present.      Mental Status: She is alert and oriented to person, place, and time. Mental status is at baseline.   Psychiatric:         Mood and Affect: Affect normal.         Behavior: Behavior normal.         Thought Content: Thought content normal.             Significant Labs:  Lab Results   Component Value Date    WBC 9.58 02/08/2025    HGB 13.4 02/08/2025    HCT 41.1 02/08/2025    MCV 93 02/08/2025     02/08/2025       CMP  Sodium   Date Value Ref Range Status   02/10/2025 140 136 - 145 mmol/L Final     Potassium   Date Value Ref Range Status   02/10/2025 4.1 3.5 - 5.1 mmol/L Final     Chloride   Date Value Ref Range Status   02/10/2025 105 95 - 110 mmol/L Final     CO2   Date Value Ref  Range Status   02/10/2025 29 23 - 29 mmol/L Final     Glucose   Date Value Ref Range Status   02/10/2025 90 70 - 110 mg/dL Final     BUN   Date Value Ref Range Status   02/10/2025 16 8 - 23 mg/dL Final     Creatinine   Date Value Ref Range Status   02/10/2025 0.8 0.5 - 1.4 mg/dL Final     Calcium   Date Value Ref Range Status   02/10/2025 8.7 8.7 - 10.5 mg/dL Final     Total Protein   Date Value Ref Range Status   02/07/2025 6.7 6.0 - 8.4 g/dL Final     Albumin   Date Value Ref Range Status   02/07/2025 4.0 3.5 - 5.2 g/dL Final     Total Bilirubin   Date Value Ref Range Status   02/07/2025 0.9 0.1 - 1.0 mg/dL Final     Comment:     For infants and newborns, interpretation of results should be based  on gestational age, weight and in agreement with clinical  observations.    Premature Infant recommended reference ranges:  Up to 24 hours.............<8.0 mg/dL  Up to 48 hours............<12.0 mg/dL  3-5 days..................<15.0 mg/dL  6-29 days.................<15.0 mg/dL       Alkaline Phosphatase   Date Value Ref Range Status   02/07/2025 79 55 - 135 U/L Final     AST   Date Value Ref Range Status   02/07/2025 24 10 - 40 U/L Final     ALT   Date Value Ref Range Status   02/07/2025 59 (H) 10 - 44 U/L Final     Anion Gap   Date Value Ref Range Status   02/10/2025 6 (L) 8 - 16 mmol/L Final     eGFR   Date Value Ref Range Status   02/10/2025 >60.0 >60 mL/min/1.73 m^2 Final     Microbiology Results (last 7 days)       ** No results found for the last 168 hours. **          Significant Imaging:  ECHO:    Left Ventricle: The left ventricle is normal in size. Mildly increased wall thickness. There is mild concentric hypertrophy. Normal wall motion. There is normal systolic function with a visually estimated ejection fraction of 60 - 65%. Unable to assess diastolic function due to atrial fibrillation.    Right Ventricle: Normal right ventricular cavity size. Wall thickness is normal. Systolic function is normal.    Left  Atrium: Left atrium is moderately dilated.    Aortic Valve: The aortic valve is a trileaflet valve. There is mild aortic valve sclerosis.    Mitral Valve: There is moderate posterior mitral annular calcification present.    Tricuspid Valve: There is mild to moderate regurgitation with a centrally directed jet.    Pulmonary Artery: There is mild pulmonary hypertension. The estimated pulmonary artery systolic pressure is 37 mmHg.    IVC/SVC: Normal venous pressure at 3 mmHg.    CXR: Radiographic findings compatible with mild CHF/volume overload

## 2025-02-10 NOTE — PLAN OF CARE
Problem: Adult Inpatient Plan of Care  Goal: Plan of Care Review  Outcome: Progressing  Goal: Patient-Specific Goal (Individualized)  Outcome: Progressing  Goal: Absence of Hospital-Acquired Illness or Injury  Outcome: Progressing  Goal: Optimal Comfort and Wellbeing  Outcome: Progressing  Goal: Readiness for Transition of Care  Outcome: Progressing     Problem: Diabetes Comorbidity  Goal: Blood Glucose Level Within Targeted Range  Outcome: Progressing     Problem: Fall Injury Risk  Goal: Absence of Fall and Fall-Related Injury  Outcome: Progressing     Problem: Cardiac Rhythm Management Device  Goal: Optimal Adjustment to Device  Outcome: Progressing  Goal: Absence of Bleeding  Outcome: Progressing  Goal: Effective Device Function  Outcome: Progressing  Goal: Absence of Infection Signs and Symptoms  Outcome: Progressing  Goal: Acceptable Pain Level  Outcome: Progressing  Goal: Effective Oxygenation and Ventilation  Outcome: Progressing

## 2025-02-10 NOTE — ASSESSMENT & PLAN NOTE
Patient's FSGs are controlled on current medication regimen.  Last A1c reviewed-   Lab Results   Component Value Date    HGBA1C 5.6 02/07/2025     Most recent fingerstick glucose reviewed-   Recent Labs   Lab 02/09/25  1223 02/09/25  1636   POCTGLUCOSE 120* 113*       Current correctional scale  Medium  Maintain anti-hyperglycemic dose as follows-   Antihyperglycemics (From admission, onward)    Start     Stop Route Frequency Ordered    02/07/25 0903  insulin aspart U-100 pen 0-10 Units         -- SubQ Before meals & nightly PRN 02/07/25 0803        Hold Oral hypoglycemics while patient is in the hospital.

## 2025-02-10 NOTE — CARE UPDATE
02/09/25 2100   Patient Assessment/Suction   Level of Consciousness (AVPU) alert   Respiratory Effort Unlabored   Expansion/Accessory Muscles/Retractions no use of accessory muscles   All Lung Fields Breath Sounds clear;diminished   Rhythm/Pattern, Respiratory no shortness of breath reported   Cough Frequency no cough   PRE-TX-O2   Device (Oxygen Therapy) nasal cannula   $ Is the patient on Low Flow Oxygen? Yes   SpO2 (!) 93 %   Pulse Oximetry Type Continuous   $ Pulse Oximetry - Multiple Charge Pulse Oximetry - Multiple   Pulse 98   Resp (!) 37   Positioning   Head of Bed (HOB) Positioning HOB elevated;HOB at 30 degrees   Preset CPAP/BiPAP Settings   Mode Of Delivery BiPAP S/T   CPAP/BIPAP charged w/in last 24 h YES   $ Is patient using? No/refused

## 2025-02-10 NOTE — CONSULTS
[facility]  Department of Cardiology  Consult Note      PATIENT NAME: Courtney Nino  MRN: [id number]  TODAY'S DATE: 02/10/2025  ADMIT DATE: 2/7/2025                          CONSULT REQUESTED BY: Amor Lucero MD    SUBJECTIVE     PRINCIPAL PROBLEM: Acute on chronic diastolic congestive heart failure      REASON FOR CONSULT:  Afib/CHF    HPI:  Ms. Nino is a 69 yr old female with pmh of paroxysmal AFib, CHF, type 2 diabetes, hypertension who originally came into the ER with complaints of shortness of breath times several days.  She states that she ran out of her medication at home and was not able to get it refilled until February 1st.  Initial EKG showed AFib RVR with rates in the 180s, and it appears she was initially bolused with amiodarone with no improvement in rate and then started on a Cardizem drip which is not currently running (stopped due to history of low EF back in 2021).  She remains in AFib at this time in the low 100s to 120s.  She is anticoagulated at home on Eliquis 5 mg b.i.d. and was taking metoprolol succinate 50 mg daily paired Mag supplements. Has not been seen by cardiology since April 2024. Was last cardioverted back in 2021. BNP mildly elevated at 292 - she was started on IV Lasix.     Per hospital medicine notes:  Patient with atrial fibrillation, CHF, type 2 diabetes, hypertension presented with worsening shortness of breath over the last several days.  Patient reports she has been out of her medication for the last 2 weeks.  Her insurance kicked in the 1st of month.  Reports gradual progressive shortness of breath.  Reportedly was hypoxic in the emergency room, placed on oxygen.  Worsening shortness of breath so BiPAP was initiated.  Also developed atrial fibrillation with RVR, given amiodarone bolus without any improvement.  Started on Cardizem drip.  Cardiology consulted.  Chest x-ray showing fluid overload.  Received Lasix in the emergency room.  .      Overview/Hospital Course:  69-year-old female with PMH hypertension, AFib, CHF, DM, KATLYN on CPAP who was admitted for acute on chronic diastolic heart failure and AFib with RVR.  Has been out of most of her medicines for nearly 2 weeks.  She required BiPAP support.  Was on nitroglycerin GTT for very brief period for hypertension.  Given IV Lasix.  Required diltiazem GTT for rate control.  Resumed her regular home medicines.  Diltiazem GTT weaned off.  Cardiology was consulted.       Review of patient's allergies indicates:  No Known Allergies    Past Medical History:   Diagnosis Date    Cigarette nicotine dependence     Hypertension     Obesity     Vitamin D deficiency      Past Surgical History:   Procedure Laterality Date    BREAST SURGERY      knot on right    HYSTERECTOMY      TONSILLECTOMY       Social History     Tobacco Use    Smoking status: Every Day     Current packs/day: 1.00     Average packs/day: 1 pack/day for 39.0 years (39.0 ttl pk-yrs)     Types: Cigarettes    Smokeless tobacco: Never   Substance Use Topics    Alcohol use: Not Currently    Drug use: Not Currently        REVIEW OF SYSTEMS    As mentioned in HPI    OBJECTIVE     VITAL SIGNS (Most Recent)  Temp: 98.2 °F (36.8 °C) (02/10/25 1215)  Pulse: 102 (02/10/25 1100)  Resp: 16 (02/10/25 1100)  BP: 139/75 (02/10/25 1100)  SpO2: (!) 90 % (02/10/25 1100)    VENTILATION STATUS  Resp: 16 (02/10/25 1100)  SpO2: (!) 90 % (02/10/25 1100)           I & O (Last 24H):  Intake/Output Summary (Last 24 hours) at 2/10/2025 1236  Last data filed at 2/10/2025 0950  Gross per 24 hour   Intake 1030 ml   Output 1950 ml   Net -920 ml       WEIGHTS  Wt Readings from Last 3 Encounters:   02/09/25 0400 86.6 kg (190 lb 14.7 oz)   02/08/25 0400 87.2 kg (192 lb 3.9 oz)   02/07/25 2000 86.4 kg (190 lb 7.6 oz)   02/07/25 0525 89 kg (196 lb 4.8 oz)   09/25/24 1351 88.5 kg (195 lb)   08/22/24 0927 88.8 kg (195 lb 11.2 oz)       PHYSICAL EXAM    CONSTITUTIONAL: NAD  HEENT:  Normocephalic. No pallor  NECK: no JVD  LUNGS: CTA b/l  HEART: irregular rate and rhythm - afib 100s, S1, S2 normal, no murmur   ABDOMEN: soft, non-tender, bowel sounds normal  EXTREMITIES: No edema  SKIN: No rash  NEURO: AAO X 3  PSYCH: normal affect     HOME MEDICATIONS:  No current facility-administered medications on file prior to encounter.     Current Outpatient Medications on File Prior to Encounter   Medication Sig Dispense Refill    ibandronate (BONIVA) 150 mg tablet Take 1 tablet (150 mg total) by mouth every 30 days. 1 tablet 11    magnesium oxide (MAG-OX) 400 mg (241.3 mg magnesium) tablet Take 1 tablet (400 mg total) by mouth 2 (two) times daily. 90 tablet 3    omega-3 fatty acids/fish oil (FISH OIL-OMEGA-3 FATTY ACIDS) 300-1,000 mg capsule Take 1 capsule by mouth once daily.      potassium chloride SA (K-DUR,KLOR-CON) 20 MEQ tablet Take 20 mEq by mouth 2 (two) times daily.      pravastatin (PRAVACHOL) 40 MG tablet TAKE 1 TABLET EVERY DAY (Patient taking differently: Take 40 mg by mouth once daily.) 90 tablet 3    vitamin E 1000 UNIT capsule Take 1,000 Units by mouth once daily.      blood-glucose meter kit To check BG 2 times daily, to use with insurance preferred meter 1 each 0    DROPSAFE ALCOHOL PREP PADS PadM USE TOPICALLY AS NEEDED AS DIRECTED 200 each 3    dulaglutide (TRULICITY) 3 mg/0.5 mL pen injector Inject 3 mg into the skin every 7 days. (Patient taking differently: Inject 3 mg into the skin every 7 days. SUNDAYS) 12 pen 3    lancets Misc To check BG 2 times daily, to use with insurance preferred meter 200 each 1    TRUE METRIX GLUCOSE TEST STRIP Strp TEST BLOOD SUGAR TWICE DAILY 200 strip 3    TRUEPLUS LANCETS 33 gauge Misc TEST BLOOD SUGAR TWICE DAILY 200 each 3       SCHEDULED MEDS:   apixaban  5 mg Oral BID    aspirin  81 mg Oral Daily    digoxin  0.125 mg Oral Daily    [START ON 2/11/2025] diltiaZEM  360 mg Oral Daily    furosemide  40 mg Oral Daily    metoprolol succinate  50 mg Oral  Daily    pravastatin  40 mg Oral QHS       CONTINUOUS INFUSIONS:   amiodarone in dextrose 5%  1 mg/min Intravenous Continuous 33.3 mL/hr at 02/10/25 1130 1 mg/min at 02/10/25 1130    amiodarone in dextrose 5%  0.5 mg/min Intravenous Continuous        dilTIAZem  0-15 mg/hr Intravenous Continuous   Stopped at 02/10/25 0846       PRN MEDS:  Current Facility-Administered Medications:     acetaminophen, 650 mg, Oral, Q8H PRN    dextrose 50%, 12.5 g, Intravenous, PRN    dextrose 50%, 25 g, Intravenous, PRN    glucagon (human recombinant), 1 mg, Intramuscular, PRN    glucose, 16 g, Oral, PRN    glucose, 24 g, Oral, PRN    insulin aspart U-100, 0-10 Units, Subcutaneous, QID (AC + HS) PRN    magnesium oxide, 800 mg, Oral, PRN    magnesium oxide, 800 mg, Oral, PRN    metoprolol, 5 mg, Intravenous, Q4H PRN    ondansetron, 4 mg, Intravenous, Q12H PRN    potassium bicarbonate, 35 mEq, Oral, PRN    potassium bicarbonate, 50 mEq, Oral, PRN    potassium bicarbonate, 60 mEq, Oral, PRN    potassium, sodium phosphates, 2 packet, Oral, PRN    potassium, sodium phosphates, 2 packet, Oral, PRN    potassium, sodium phosphates, 2 packet, Oral, PRN    sodium chloride 0.9%, 10 mL, Intravenous, PRN    LABS AND DIAGNOSTICS     CBC LAST 3 DAYS  Recent Labs   Lab 02/07/25  0532 02/07/25  0539 02/08/25  0417   WBC 13.09*  --  9.58   RBC 4.86  --  4.44   HGB 14.6  --  13.4   HCT 45.7 45 41.1   MCV 94  --  93   MCH 30.0  --  30.2   MCHC 31.9*  --  32.6   RDW 14.3  --  14.3     --  261   MPV 11.8  --  11.3   GRAN 73.2*  9.6*  --  60.7  5.8   LYMPH 17.6*  2.3  --  25.9  2.5   MONO 7.6  1.0  --  9.9  1.0   BASO 0.07  --  0.08   NRBC 0  --  0       COAGULATION LAST 3 DAYS  Recent Labs   Lab 02/07/25  0532   INR 1.1       CHEMISTRY LAST 3 DAYS  Recent Labs   Lab 02/07/25  0532 02/07/25  0539 02/08/25  0417 02/09/25  0249 02/10/25  0422     --  142 143 140   K 4.3  --  3.2* 3.6 4.1     --  105 105 105   CO2 24  --  29 31* 29    ANIONGAP 9  --  8 7* 6*   BUN 15  --  17 15 16   CREATININE 0.9  --  0.8 0.8 0.8   *  --  109 117* 90   CALCIUM 8.8  --  8.2* 8.3* 8.7   PH  --  7.408  --   --   --    MG 1.8  --  2.0 2.0 2.0   ALBUMIN 4.0  --   --   --   --    PROT 6.7  --   --   --   --    ALKPHOS 79  --   --   --   --    ALT 59*  --   --   --   --    AST 24  --   --   --   --    BILITOT 0.9  --   --   --   --        CARDIAC PROFILE LAST 3 DAYS  Recent Labs   Lab 02/07/25  0532 02/07/25  0537 02/07/25  1211   BNP  --  292*  --    TROPONINIHS 9.1  --  8.1       ENDOCRINE LAST 3 DAYS  Recent Labs   Lab 02/07/25  1211   TSH 4.370       LAST ARTERIAL BLOOD GAS  ABG  Recent Labs   Lab 02/07/25  0539   PH 7.408   PO2 68*   PCO2 37.3   HCO3 23.6*   BE -1       LAST 7 DAYS MICROBIOLOGY   Microbiology Results (last 7 days)       ** No results found for the last 168 hours. **            MOST RECENT IMAGING  Echo    Left Ventricle: The left ventricle is normal in size. Mildly increased   wall thickness. There is mild concentric hypertrophy. Normal wall motion.   There is normal systolic function with a visually estimated ejection   fraction of 60 - 65%. Unable to assess diastolic function due to atrial   fibrillation.    Right Ventricle: Normal right ventricular cavity size. Wall thickness   is normal. Systolic function is normal.    Left Atrium: Left atrium is moderately dilated.    Aortic Valve: The aortic valve is a trileaflet valve. There is mild   aortic valve sclerosis.    Mitral Valve: There is moderate posterior mitral annular calcification   present.    Tricuspid Valve: There is mild to moderate regurgitation with a   centrally directed jet.    Pulmonary Artery: There is mild pulmonary hypertension. The estimated   pulmonary artery systolic pressure is 37 mmHg.    IVC/SVC: Normal venous pressure at 3 mmHg.      ECHOCARDIOGRAM RESULTS (last 5)  Results for orders placed during the hospital encounter of 02/07/25    Echo    Interpretation  Summary    Left Ventricle: The left ventricle is normal in size. Mildly increased wall thickness. There is mild concentric hypertrophy. Normal wall motion. There is normal systolic function with a visually estimated ejection fraction of 60 - 65%. Unable to assess diastolic function due to atrial fibrillation.    Right Ventricle: Normal right ventricular cavity size. Wall thickness is normal. Systolic function is normal.    Left Atrium: Left atrium is moderately dilated.    Aortic Valve: The aortic valve is a trileaflet valve. There is mild aortic valve sclerosis.    Mitral Valve: There is moderate posterior mitral annular calcification present.    Tricuspid Valve: There is mild to moderate regurgitation with a centrally directed jet.    Pulmonary Artery: There is mild pulmonary hypertension. The estimated pulmonary artery systolic pressure is 37 mmHg.    IVC/SVC: Normal venous pressure at 3 mmHg.      Results for orders placed during the hospital encounter of 07/02/24    Echo    Interpretation Summary    Left Ventricle: The left ventricle is normal in size. Mildly increased wall thickness. There is mild concentric hypertrophy. Normal wall motion. There is normal systolic function with a visually estimated ejection fraction of 55 - 60%. There is normal diastolic function.    Right Ventricle: Normal right ventricular cavity size. Wall thickness is normal. Systolic function is normal.    Left Atrium: Left atrium is moderately dilated.    Aortic Valve: The aortic valve is a trileaflet valve. There is moderate aortic valve sclerosis.    Mitral Valve: There is mild posterior leaflet sclerosis.    Tricuspid Valve: There is mild to moderate regurgitation with a centrally directed jet.    Pulmonary Artery: The estimated pulmonary artery systolic pressure is 33 mmHg.    IVC/SVC: Normal venous pressure at 3 mmHg.      Results for orders placed during the hospital encounter of 10/25/21    Transesophageal echo (EAMON) with  possible cardioversion    Interpretation Summary  · The left ventricle is mildly enlarged with concentric remodeling and moderately decreased systolic function.  · Left ventricular diastolic dysfunction.  · There is severe left ventricular global hypokinesis.  · Mild right ventricular enlargement with normal right ventricular systolic function.  · Severe left atrial enlargement.  · Moderate right atrial enlargement.  · Moderate mitral regurgitation.  · Moderate tricuspid regurgitation.  · No interatrial septal defect present.  · Normal appearing left atrial appendage. No thrombus is present in the appendage. JACI occluder is absent. Abnormal appendage velocities.  · The estimated ejection fraction is 30%.  · A 150 J synchronized cardioversion was unsuccessful without restoration of normal sinus rhythm.  · A 200 J synchronized cardioversion was unsuccessful without restoration of normal sinus rhythm.  · A 200 J restored sinus rhythm with early recurrence of atrial fibrillation (ERAF).      Echo    Interpretation Summary  · The left ventricle is normal in size with moderate concentric hypertrophy and  · The estimated ejection fraction is 29%.  · There is moderate left ventricular global hypokinesis.  · Atrial fibrillation observed with restrictive filling pattern present.  · With elevated left atrial pressure.  · Normal right ventricular size with normal right ventricular systolic function.  · Mild left atrial enlargement.  · There is moderate aortic valve stenosis.  · Aortic valve area is 1.38 cm2; peak velocity is 1.53 m/s; mean gradient is 6 mmHg.  · Mild tricuspid regurgitation.  · Normal central venous pressure (3 mmHg).  · The estimated PA systolic pressure is 25 mmHg.      Results for orders placed in visit on 03/16/21    Echo Color Flow Doppler? Yes    Interpretation Summary  · The estimated PA systolic pressure is 30 mmHg.  · The left ventricle is normal in size with mild concentric hypertrophy and normal  systolic function. The estimated ejection fraction is 65%  · Indeterminate left ventricular diastolic function.  · Normal right ventricular size with normal right ventricular systolic function.  · Moderate left atrial enlargement.  · Mild to moderate tricuspid regurgitation.  · Mild aortic regurgitation.      CURRENT/PREVIOUS VISIT EKG  Results for orders placed or performed during the hospital encounter of 02/07/25   EKG 12-lead    Collection Time: 02/07/25  5:21 AM   Result Value Ref Range    QRS Duration 68 ms    [facility] QTC Calculation 418 ms    Narrative    Test Reason : R07.9,    Vent. Rate : 189 BPM     Atrial Rate : 202 BPM     P-R Int :    ms          QRS Dur :  68 ms      QT Int : 236 ms       P-R-T Axes :     38  46 degrees    QTcB Int : 418 ms    Atrial fibrillation with rapid ventricular response  Septal infarct (cited on or before 25-Oct-2021)  Abnormal ECG  When compared with ECG of 15-Feb-2024 20:31,  Vent. rate has increased by 100 bpm  Confirmed by Saul Cabrera ([id number]) on 2/8/2025 6:16:16 PM    Referred By: AAAREFERRAL SELF           Confirmed By: Saul Cabrera           ASSESSMENT/PLAN:     Active Hospital Problems    Diagnosis    *Acute on chronic diastolic congestive heart failure    Type 2 diabetes mellitus with both eyes affected by mild nonproliferative retinopathy without macular edema, without long-term current use of insulin    Atrial fibrillation with RVR    Obstructive sleep apnea syndrome    Hypertension       ASSESSMENT & PLAN:     Acute on chronic CHF  T2DM  Afib RVR  KATLYN  HTN      RECOMMENDATIONS:    Start amio gtt at 1 x 6 hours then reduce to 0.5. TSH/Liver enzymes stable.   Hold NPO at midnight with plans for EAMON/CV in the AM. Orders placed.   Risks of EAMON were discussed with patient the risks include but not limited to oropharyngeal trauma, dental trauma, trauma to the esophagus, possible aspiration, and reaction to anesthesia.  Risks of cardioversion include but are not limited  to arrhythmia and stroke.   Continue anticoagulation with Eliquis 5 mg BID.  Dc PO dig.  Decrease PO Cardizem to 180 mg daily - hold for HR <60.  Continue asa/statin therapies.  Most recent ECHO showed preserved EF 60-65%.    Mildly elevated BNP at 292 - agree with Lasix.  Will need sleep study as an outpatient if not done already.   Will continue follow.     Lakia Friedman   Department of Cardiology  Date of Service: 02/10/2025      69-year-old with history of paroxysmal atrial fibrillation readmitted with recurrence of atrial fibrillation failed to convert to sinus rhythm with the intravenous amiodarone drip as well as intravenous Cardizem drip and mild pulmonary congestion elevated BNP on admission treated with Lasix intravenous.  Patient has been on Eliquis 5 mg p.o. b.i.d. with no obvious bleeding tendencies.  I have personally interviewed and examined the patient, I have reviewed the Nurse Practitioner's history and physical, assessment, and plan. I agree with the findings and plan.  I have personally reviewed the chest x-ray images and the laboratory profile noted to have mild elevation of ALT  Recommend to repeat the labs tomorrow  Tentatively plan for EAMON and cardioversion tomorrow  Risks and Benefits of the procedure have been explained to the patient. alternatives discussed in detail regarding upcoming procedures and sedation and possible complications. Some of the more  complications include but not limited to immediate or delayed perforation, bleeding, infections, pain, inadvertent injury to surrounding tissue / organs and possible need for surgical evaluation.To include but  not limited to oropharyngeal damage, tooth damage, aspiration, sore throat and risk of anesthesia have been explained to patient.  Other risks of conscious sedation to be explained by anesthesiologist.  All questions have been answered to patient's satisfaction Informed consent obtained.      Dr. Saul Cabrera   Department of  Cardiology  Date of Service: 02/10/2025  12:36 PM

## 2025-02-10 NOTE — ASSESSMENT & PLAN NOTE
Controlled off diltiazem GTT.  Will attempt to increase Cardizem 360 mg CD daily and wean of Cardizem infusion.  -continue home digoxin, diltiazem, metoprolol.  -continue therapeutic Lovenox, can resume home Eliquis on discharge  -telemetry  -cardiology consulted  -optimize electrolytes

## 2025-02-11 ENCOUNTER — ANESTHESIA EVENT (OUTPATIENT)
Dept: CARDIOLOGY | Facility: HOSPITAL | Age: 70
End: 2025-02-11
Payer: MEDICARE

## 2025-02-11 ENCOUNTER — ANESTHESIA (OUTPATIENT)
Dept: CARDIOLOGY | Facility: HOSPITAL | Age: 70
End: 2025-02-11
Payer: MEDICARE

## 2025-02-11 ENCOUNTER — CLINICAL SUPPORT (OUTPATIENT)
Dept: CARDIOLOGY | Facility: HOSPITAL | Age: 70
End: 2025-02-11
Attending: STUDENT IN AN ORGANIZED HEALTH CARE EDUCATION/TRAINING PROGRAM
Payer: MEDICARE

## 2025-02-11 VITALS
HEART RATE: 98 BPM | TEMPERATURE: 98 F | BODY MASS INDEX: 32.6 KG/M2 | DIASTOLIC BLOOD PRESSURE: 67 MMHG | WEIGHT: 190.94 LBS | OXYGEN SATURATION: 93 % | HEIGHT: 64 IN | RESPIRATION RATE: 26 BRPM | SYSTOLIC BLOOD PRESSURE: 145 MMHG

## 2025-02-11 VITALS
DIASTOLIC BLOOD PRESSURE: 65 MMHG | SYSTOLIC BLOOD PRESSURE: 104 MMHG | BODY MASS INDEX: 32.6 KG/M2 | OXYGEN SATURATION: 96 % | RESPIRATION RATE: 22 BRPM | HEIGHT: 64 IN | HEART RATE: 83 BPM | WEIGHT: 190.94 LBS

## 2025-02-11 LAB
ANION GAP SERPL CALC-SCNC: 9 MMOL/L (ref 8–16)
BSA FOR ECHO PROCEDURE: 1.98 M2
BUN SERPL-MCNC: 20 MG/DL (ref 8–23)
CALCIUM SERPL-MCNC: 8.7 MG/DL (ref 8.7–10.5)
CHLORIDE SERPL-SCNC: 104 MMOL/L (ref 95–110)
CO2 SERPL-SCNC: 26 MMOL/L (ref 23–29)
CREAT SERPL-MCNC: 0.8 MG/DL (ref 0.5–1.4)
EST. GFR  (NO RACE VARIABLE): >60 ML/MIN/1.73 M^2
GLUCOSE SERPL-MCNC: 88 MG/DL (ref 70–110)
IVC DIAMETER: 2 CM
MAGNESIUM SERPL-MCNC: 2.1 MG/DL (ref 1.6–2.6)
PISA TR MAX VEL: 3.5 M/S
POCT GLUCOSE: 171 MG/DL (ref 70–110)
POCT GLUCOSE: 94 MG/DL (ref 70–110)
POCT GLUCOSE: 99 MG/DL (ref 70–110)
POTASSIUM SERPL-SCNC: 4 MMOL/L (ref 3.5–5.1)
RA PRESSURE ESTIMATED: 8 MMHG
RV TB RVSP: 12 MMHG
SODIUM SERPL-SCNC: 139 MMOL/L (ref 136–145)
TR MAX PG: 49 MMHG
TV REST PULMONARY ARTERY PRESSURE: 57 MMHG

## 2025-02-11 PROCEDURE — 5A2204Z RESTORATION OF CARDIAC RHYTHM, SINGLE: ICD-10-PCS | Performed by: INTERNAL MEDICINE

## 2025-02-11 PROCEDURE — 97530 THERAPEUTIC ACTIVITIES: CPT

## 2025-02-11 PROCEDURE — B246ZZ4 ULTRASONOGRAPHY OF RIGHT AND LEFT HEART, TRANSESOPHAGEAL: ICD-10-PCS | Performed by: INTERNAL MEDICINE

## 2025-02-11 PROCEDURE — 63600175 PHARM REV CODE 636 W HCPCS

## 2025-02-11 PROCEDURE — 93312 ECHO TRANSESOPHAGEAL: CPT | Mod: 26,,, | Performed by: INTERNAL MEDICINE

## 2025-02-11 PROCEDURE — 25000003 PHARM REV CODE 250: Performed by: INTERNAL MEDICINE

## 2025-02-11 PROCEDURE — 25000003 PHARM REV CODE 250

## 2025-02-11 PROCEDURE — 25000003 PHARM REV CODE 250: Performed by: STUDENT IN AN ORGANIZED HEALTH CARE EDUCATION/TRAINING PROGRAM

## 2025-02-11 PROCEDURE — 99900035 HC TECH TIME PER 15 MIN (STAT)

## 2025-02-11 PROCEDURE — 94618 PULMONARY STRESS TESTING: CPT

## 2025-02-11 PROCEDURE — 27000221 HC OXYGEN, UP TO 24 HOURS

## 2025-02-11 PROCEDURE — 97535 SELF CARE MNGMENT TRAINING: CPT

## 2025-02-11 PROCEDURE — 80048 BASIC METABOLIC PNL TOTAL CA: CPT | Performed by: STUDENT IN AN ORGANIZED HEALTH CARE EDUCATION/TRAINING PROGRAM

## 2025-02-11 PROCEDURE — 92960 CARDIOVERSION ELECTRIC EXT: CPT | Mod: ,,, | Performed by: INTERNAL MEDICINE

## 2025-02-11 PROCEDURE — 83735 ASSAY OF MAGNESIUM: CPT | Performed by: STUDENT IN AN ORGANIZED HEALTH CARE EDUCATION/TRAINING PROGRAM

## 2025-02-11 PROCEDURE — 36415 COLL VENOUS BLD VENIPUNCTURE: CPT | Performed by: STUDENT IN AN ORGANIZED HEALTH CARE EDUCATION/TRAINING PROGRAM

## 2025-02-11 PROCEDURE — C8925 2D TEE W OR W/O FOL W/CON,IN: HCPCS

## 2025-02-11 RX ORDER — AMIODARONE HYDROCHLORIDE 200 MG/1
200 TABLET ORAL 3 TIMES DAILY
Status: DISCONTINUED | OUTPATIENT
Start: 2025-02-11 | End: 2025-02-11 | Stop reason: HOSPADM

## 2025-02-11 RX ORDER — EPINEPHRINE 0.1 MG/ML
INJECTION INTRAVENOUS
Status: DISCONTINUED
Start: 2025-02-11 | End: 2025-02-11 | Stop reason: WASHOUT

## 2025-02-11 RX ORDER — AMIODARONE HYDROCHLORIDE 200 MG/1
TABLET ORAL
Qty: 57 TABLET | Refills: 0 | Status: SHIPPED | OUTPATIENT
Start: 2025-02-11

## 2025-02-11 RX ORDER — PROPOFOL 10 MG/ML
VIAL (ML) INTRAVENOUS
Status: DISCONTINUED | OUTPATIENT
Start: 2025-02-11 | End: 2025-02-11

## 2025-02-11 RX ORDER — ATROPINE SULFATE 0.1 MG/ML
INJECTION INTRAVENOUS
Status: DISCONTINUED
Start: 2025-02-11 | End: 2025-02-11 | Stop reason: WASHOUT

## 2025-02-11 RX ORDER — FUROSEMIDE 40 MG/1
40 TABLET ORAL DAILY
Qty: 30 TABLET | Refills: 0 | Status: SHIPPED | OUTPATIENT
Start: 2025-02-12 | End: 2026-02-12

## 2025-02-11 RX ORDER — DILTIAZEM HYDROCHLORIDE 180 MG/1
180 CAPSULE, COATED, EXTENDED RELEASE ORAL DAILY
Qty: 30 CAPSULE | Refills: 0 | Status: SHIPPED | OUTPATIENT
Start: 2025-02-12 | End: 2026-02-12

## 2025-02-11 RX ORDER — SODIUM CHLORIDE 9 MG/ML
INJECTION, SOLUTION INTRAVENOUS CONTINUOUS PRN
Status: DISCONTINUED | OUTPATIENT
Start: 2025-02-11 | End: 2025-02-11

## 2025-02-11 RX ORDER — PHENYLEPHRINE HYDROCHLORIDE 10 MG/ML
INJECTION INTRAVENOUS
Status: DISCONTINUED | OUTPATIENT
Start: 2025-02-11 | End: 2025-02-11

## 2025-02-11 RX ADMIN — PROPOFOL 50 MG: 10 INJECTION, EMULSION INTRAVENOUS at 10:02

## 2025-02-11 RX ADMIN — ASPIRIN 81 MG: 81 TABLET, COATED ORAL at 12:02

## 2025-02-11 RX ADMIN — AMIODARONE HYDROCHLORIDE 0.5 MG/MIN: 1.8 INJECTION, SOLUTION INTRAVENOUS at 04:02

## 2025-02-11 RX ADMIN — METOPROLOL SUCCINATE 50 MG: 50 TABLET, FILM COATED, EXTENDED RELEASE ORAL at 12:02

## 2025-02-11 RX ADMIN — FUROSEMIDE 40 MG: 40 TABLET ORAL at 12:02

## 2025-02-11 RX ADMIN — DILTIAZEM HYDROCHLORIDE 180 MG: 180 CAPSULE, COATED, EXTENDED RELEASE ORAL at 12:02

## 2025-02-11 RX ADMIN — SODIUM CHLORIDE: 9 INJECTION, SOLUTION INTRAVENOUS at 10:02

## 2025-02-11 RX ADMIN — PROPOFOL 20 MG: 10 INJECTION, EMULSION INTRAVENOUS at 10:02

## 2025-02-11 RX ADMIN — Medication 400 MG: at 12:02

## 2025-02-11 RX ADMIN — PHENYLEPHRINE HYDROCHLORIDE 200 MCG: 10 INJECTION INTRAVENOUS at 10:02

## 2025-02-11 RX ADMIN — AMIODARONE HYDROCHLORIDE 200 MG: 200 TABLET ORAL at 03:02

## 2025-02-11 RX ADMIN — APIXABAN 5 MG: 5 TABLET, FILM COATED ORAL at 12:02

## 2025-02-11 NOTE — ASSESSMENT & PLAN NOTE
Patient was initially treated with Cardizem infusion which was later transitioned to intravenous amiodarone infusion and underwent DC cardioversion which has been successful on February 11, 2025.  -continue home digoxin, diltiazem, metoprolol.  -continue therapeutic Lovenox, can resume home Eliquis on discharge  -telemetry  -cardiology consulted  -optimize electrolytes

## 2025-02-11 NOTE — CARE UPDATE
02/11/25 1500   Home Oxygen Qualification   $ Home O2 Qualification Pulmonary Stress Test/6 min walk   Room Air SpO2 At Rest 95 %   Room Air SpO2 During Ambulation 93 %   SpO2 Post Ambulation 95 %   Post Ambulation Heart Rate 96 bpm

## 2025-02-11 NOTE — ANESTHESIA PREPROCEDURE EVALUATION
02/11/2025  Courtney Nino is a 69 y.o., female.      Patient Active Problem List   Diagnosis    Class 3 severe obesity with body mass index (BMI) of 40.0 to 44.9 in adult    Hypertension    Cigarette smoker    Acrocyanosis    Obstructive sleep apnea syndrome    Atrial fibrillation with RVR    CHF (congestive heart failure)    Moderate aortic stenosis    Type 2 diabetes mellitus with both eyes affected by mild nonproliferative retinopathy without macular edema, without long-term current use of insulin    Fatigue    Asymptomatic menopausal state    Immunization due    PAD (peripheral artery disease)    Acute on chronic diastolic congestive heart failure       Past Surgical History:   Procedure Laterality Date    BREAST SURGERY      knot on right    HYSTERECTOMY      TONSILLECTOMY          Tobacco Use:  The patient  reports that she has been smoking cigarettes. She has a 39 pack-year smoking history. She has never used smokeless tobacco.     Results for orders placed or performed during the hospital encounter of 02/07/25   EKG 12-lead    Collection Time: 02/07/25  5:21 AM   Result Value Ref Range    QRS Duration 68 ms    [facility] QTC Calculation 418 ms    Narrative    Test Reason : R07.9,    Vent. Rate : 189 BPM     Atrial Rate : 202 BPM     P-R Int :    ms          QRS Dur :  68 ms      QT Int : 236 ms       P-R-T Axes :     38  46 degrees    QTcB Int : 418 ms    Atrial fibrillation with rapid ventricular response  Septal infarct (cited on or before 25-Oct-2021)  Abnormal ECG  When compared with ECG of 15-Feb-2024 20:31,  Vent. rate has increased by 100 bpm  Confirmed by Saul Cabrera ([id number]) on 2/8/2025 6:16:16 PM    Referred By: [email or web address] SELF           Confirmed By: Saul Cabrera        Imaging Results              X-Ray Chest AP Portable (Final result)  Result time 02/07/25 07:00:44      Final  result by Farooq Menjivar MD (02/07/25 07:00:44)                   Impression:      1. Radiographic findings compatible with mild CHF/volume overload      Electronically signed by: Farooq Menjivar  Date:    02/07/2025  Time:    07:00               Narrative:    EXAMINATION:  XR CHEST AP PORTABLE    CLINICAL HISTORY:  SOB;    COMPARISON:  2024    FINDINGS:  AP view shows the heart to be upper normal in size.  The mediastinum is unremarkable.  Pulmonary vasculature and interstitial markings are prominent.  No pleural effusion is identified.  No acute osseous abnormalities are demonstrated.                                       Lab Results   Component Value Date    WBC 9.58 02/08/2025    HGB 13.4 02/08/2025    HCT 41.1 02/08/2025    MCV 93 02/08/2025     02/08/2025     BMP  Lab Results   Component Value Date     02/11/2025    K 4.0 02/11/2025     02/11/2025    CO2 26 02/11/2025    BUN 20 02/11/2025    CREATININE 0.8 02/11/2025    CALCIUM 8.7 02/11/2025    ANIONGAP 9 02/11/2025    GLU 88 02/11/2025     (H) 02/10/2025    GLU 90 02/10/2025       Results for orders placed during the hospital encounter of 10/25/21    Echo    Interpretation Summary  · The left ventricle is normal in size with moderate concentric hypertrophy and  · The estimated ejection fraction is 29%.  · There is moderate left ventricular global hypokinesis.  · Atrial fibrillation observed with restrictive filling pattern present.  · With elevated left atrial pressure.  · Normal right ventricular size with normal right ventricular systolic function.  · Mild left atrial enlargement.  · There is moderate aortic valve stenosis.  · Aortic valve area is 1.38 cm2; peak velocity is 1.53 m/s; mean gradient is 6 mmHg.  · Mild tricuspid regurgitation.  · Normal central venous pressure (3 mmHg).  · The estimated PA systolic pressure is 25 mmHg.            Anesthesia Evaluation    I have reviewed the Patient Summary Reports.     I have  reviewed the Nursing Notes. I have reviewed the NPO Status.   I have reviewed the Medications.     Review of Systems  Anesthesia Hx:             Denies Family Hx of Anesthesia complications.    Denies Personal Hx of Anesthesia complications.                    Social:  Smoker       Cardiovascular:     Hypertension Valvular problems/Murmurs, AS   Dysrhythmias atrial fibrillation  CHF   PVD  MAHAJAN  ECG has been reviewed.                            Pulmonary:        Sleep Apnea                    Physical Exam  General:  Well nourished, Morbid Obesity, Cooperative, Alert and Oriented       Airway/Jaw/Neck:  Airway Findings: Mouth Opening: Normal     Tongue: Normal      General Airway Assessment: Adult      Mallampati: II   TM Distance: Normal, at least 6 cm   Jaw/Neck Findings:     Neck ROM: Normal ROM          Dental:  Dental Findings:     Chest/Lungs:  Chest/Lungs Findings:  Clear to auscultation, Normal Respiratory Rate       Heart/Vascular:  Heart Findings: Rate: Normal  Rhythm: Regular Rhythm  Sounds: Normal                       Mental Status:  Mental Status Findings:  Alert and Oriented         Anesthesia Plan  Type of Anesthesia, risks & benefits discussed:  Anesthesia Type:  Gen Natural Airway    Patient's Preference:   Plan Factors:          Intra-op Monitoring Plan: standard ASA monitors  Intra-op Monitoring Plan Comments:   Post Op Pain Control Plan:   Post Op Pain Control Plan Comments:     Induction:   IV  Beta Blocker:         Informed Consent: Informed consent signed with the Patient and all parties understand the risks and agree with anesthesia plan.  All questions answered.  Anesthesia consent signed with patient.  ASA Score: 3     Day of Surgery Review of History & Physical:        Anesthesia Plan Notes: GNA/POM  (Low EF, KATLYN, Tobacco use)        Ready For Surgery From Anesthesia Perspective.             Physical Exam  General: Well nourished, Morbid Obesity, Cooperative, Alert and  Oriented    Airway:  Mallampati: II   Mouth Opening: Normal  TM Distance: Normal, at least 6 cm  Tongue: Normal  Neck ROM: Normal ROM    Chest/Lungs:  Clear to auscultation, Normal Respiratory Rate    Heart:  Rate: Normal  Rhythm: Regular Rhythm  Sounds: Normal        Anesthesia Plan  Type of Anesthesia, risks & benefits discussed:    Anesthesia Type: Gen Natural Airway  Intra-op Monitoring Plan: standard ASA monitors  Induction:  IV  Informed Consent: Informed consent signed with the Patient and all parties understand the risks and agree with anesthesia plan.  All questions answered.   ASA Score: 3  Anesthesia Plan Notes: GNA/POM  (Low EF, KATLYN, Tobacco use)    Ready For Surgery From Anesthesia Perspective.     .

## 2025-02-11 NOTE — PROGRESS NOTES
On license of [facility]  Department of Cardiology  Consult Note      PATIENT NAME: Courtney Nino  MRN: [id number]  TODAY'S DATE: 02/11/2025  ADMIT DATE: 2/7/2025                          CONSULT REQUESTED BY: Amor Lucero MD    SUBJECTIVE     PRINCIPAL PROBLEM: Acute on chronic diastolic congestive heart failure    REASON FOR CONSULT:  Afib/CHF    Interval history:  02/11/2025  Pt with successful EAMON/CV this morning with Dr. Cabrera - 120J x1.     HPI:  Ms. Nino is a 69 yr old female with pmh of paroxysmal AFib, CHF, type 2 diabetes, hypertension who originally came into the ER with complaints of shortness of breath times several days.  She states that she ran out of her medication at home and was not able to get it refilled until February 1st.  Initial EKG showed AFib RVR with rates in the 180s, and it appears she was initially bolused with amiodarone with no improvement in rate and then started on a Cardizem drip which is not currently running (stopped due to history of low EF back in 2021).  She remains in AFib at this time in the low 100s to 120s.  She is anticoagulated at home on Eliquis 5 mg b.i.d. and was taking metoprolol succinate 50 mg daily paired Mag supplements. Has not been seen by cardiology since April 2024. Was last cardioverted back in 2021. BNP mildly elevated at 292 - she was started on IV Lasix.     Per hospital medicine notes:  Patient with atrial fibrillation, CHF, type 2 diabetes, hypertension presented with worsening shortness of breath over the last several days.  Patient reports she has been out of her medication for the last 2 weeks.  Her insurance kicked in the 1st of month.  Reports gradual progressive shortness of breath.  Reportedly was hypoxic in the emergency room, placed on oxygen.  Worsening shortness of breath so BiPAP was initiated.  Also developed atrial fibrillation with RVR, given amiodarone bolus without any improvement.  Started on Cardizem drip.  Cardiology  consulted.  Chest x-ray showing fluid overload.  Received Lasix in the emergency room.  .     Overview/Hospital Course:  69-year-old female with PMH hypertension, AFib, CHF, DM, KATLYN on CPAP who was admitted for acute on chronic diastolic heart failure and AFib with RVR.  Has been out of most of her medicines for nearly 2 weeks.  She required BiPAP support.  Was on nitroglycerin GTT for very brief period for hypertension.  Given IV Lasix.  Required diltiazem GTT for rate control.  Resumed her regular home medicines.  Diltiazem GTT weaned off.  Cardiology was consulted.       Review of patient's allergies indicates:  No Known Allergies    Past Medical History:   Diagnosis Date    Cigarette nicotine dependence     Hypertension     Obesity     Vitamin D deficiency      Past Surgical History:   Procedure Laterality Date    BREAST SURGERY      knot on right    HYSTERECTOMY      TONSILLECTOMY       Social History     Tobacco Use    Smoking status: Every Day     Current packs/day: 1.00     Average packs/day: 1 pack/day for 39.0 years (39.0 ttl pk-yrs)     Types: Cigarettes    Smokeless tobacco: Never   Substance Use Topics    Alcohol use: Not Currently    Drug use: Not Currently        REVIEW OF SYSTEMS    As mentioned in HPI    OBJECTIVE     VITAL SIGNS (Most Recent)  Temp: 97.7 °F (36.5 °C) (02/11/25 0700)  Pulse: 80 (02/11/25 1028)  Resp: (!) 27 (02/11/25 1028)  BP: 125/60 (02/11/25 1028)  SpO2: 100 % (02/11/25 1028)    VENTILATION STATUS  Resp: (!) 27 (02/11/25 1028)  SpO2: 100 % (02/11/25 1028)           I & O (Last 24H):  Intake/Output Summary (Last 24 hours) at 2/11/2025 1055  Last data filed at 2/11/2025 1029  Gross per 24 hour   Intake 851.09 ml   Output 1750 ml   Net -898.91 ml       WEIGHTS  Wt Readings from Last 3 Encounters:   02/09/25 0400 86.6 kg (190 lb 14.7 oz)   02/08/25 0400 87.2 kg (192 lb 3.9 oz)   02/07/25 2000 86.4 kg (190 lb 7.6 oz)   02/07/25 0525 89 kg (196 lb 4.8 oz)   09/25/24 1351 88.5 kg  (195 lb)   08/22/24 0927 88.8 kg (195 lb 11.2 oz)       PHYSICAL EXAM    CONSTITUTIONAL: NAD  HEENT: Normocephalic. No pallor  NECK: no JVD  LUNGS: CTA b/l  HEART: sinus tyree post CV, S1, S2 normal, no murmur   ABDOMEN: soft, non-tender, bowel sounds normal  EXTREMITIES: No edema  SKIN: No rash  NEURO: AAO X 3  PSYCH: normal affect     HOME MEDICATIONS:  No current facility-administered medications on file prior to encounter.     Current Outpatient Medications on File Prior to Encounter   Medication Sig Dispense Refill    ibandronate (BONIVA) 150 mg tablet Take 1 tablet (150 mg total) by mouth every 30 days. 1 tablet 11    magnesium oxide (MAG-OX) 400 mg (241.3 mg magnesium) tablet Take 1 tablet (400 mg total) by mouth 2 (two) times daily. 90 tablet 3    omega-3 fatty acids/fish oil (FISH OIL-OMEGA-3 FATTY ACIDS) 300-1,000 mg capsule Take 1 capsule by mouth once daily.      potassium chloride SA (K-DUR,KLOR-CON) 20 MEQ tablet Take 20 mEq by mouth 2 (two) times daily.      pravastatin (PRAVACHOL) 40 MG tablet TAKE 1 TABLET EVERY DAY (Patient taking differently: Take 40 mg by mouth once daily.) 90 tablet 3    vitamin E 1000 UNIT capsule Take 1,000 Units by mouth once daily.      blood-glucose meter kit To check BG 2 times daily, to use with insurance preferred meter 1 each 0    DROPSAFE ALCOHOL PREP PADS PadM USE TOPICALLY AS NEEDED AS DIRECTED 200 each 3    dulaglutide (TRULICITY) 3 mg/0.5 mL pen injector Inject 3 mg into the skin every 7 days. (Patient taking differently: Inject 3 mg into the skin every 7 days. SUNDAYS) 12 pen 3    lancets Misc To check BG 2 times daily, to use with insurance preferred meter 200 each 1    TRUE METRIX GLUCOSE TEST STRIP Strp TEST BLOOD SUGAR TWICE DAILY 200 strip 3    TRUEPLUS LANCETS 33 gauge Misc TEST BLOOD SUGAR TWICE DAILY 200 each 3       SCHEDULED MEDS:   amiodarone  200 mg Oral TID    apixaban  5 mg Oral BID    aspirin  81 mg Oral Daily    diltiaZEM  180 mg Oral Daily     furosemide  40 mg Oral Daily    magnesium oxide  400 mg Oral Daily    metoprolol succinate  50 mg Oral Daily    pravastatin  40 mg Oral QHS       CONTINUOUS INFUSIONS:        PRN MEDS:  Current Facility-Administered Medications:     acetaminophen, 650 mg, Oral, Q8H PRN    dextrose 50%, 12.5 g, Intravenous, PRN    dextrose 50%, 25 g, Intravenous, PRN    glucagon (human recombinant), 1 mg, Intramuscular, PRN    glucose, 16 g, Oral, PRN    glucose, 24 g, Oral, PRN    insulin aspart U-100, 0-10 Units, Subcutaneous, QID (AC + HS) PRN    magnesium oxide, 800 mg, Oral, PRN    magnesium oxide, 800 mg, Oral, PRN    metoprolol, 5 mg, Intravenous, Q4H PRN    ondansetron, 4 mg, Intravenous, Q12H PRN    potassium bicarbonate, 35 mEq, Oral, PRN    potassium bicarbonate, 50 mEq, Oral, PRN    potassium bicarbonate, 60 mEq, Oral, PRN    potassium, sodium phosphates, 2 packet, Oral, PRN    potassium, sodium phosphates, 2 packet, Oral, PRN    potassium, sodium phosphates, 2 packet, Oral, PRN    sodium chloride 0.9%, 10 mL, Intravenous, PRN    LABS AND DIAGNOSTICS     CBC LAST 3 DAYS  Recent Labs   Lab 02/07/25  0532 02/07/25  0539 02/08/25 0417   WBC 13.09*  --  9.58   RBC 4.86  --  4.44   HGB 14.6  --  13.4   HCT 45.7 45 41.1   MCV 94  --  93   MCH 30.0  --  30.2   MCHC 31.9*  --  32.6   RDW 14.3  --  14.3     --  261   MPV 11.8  --  11.3   GRAN 73.2*  9.6*  --  60.7  5.8   LYMPH 17.6*  2.3  --  25.9  2.5   MONO 7.6  1.0  --  9.9  1.0   BASO 0.07  --  0.08   NRBC 0  --  0       COAGULATION LAST 3 DAYS  Recent Labs   Lab 02/07/25  0532   INR 1.1       CHEMISTRY LAST 3 DAYS  Recent Labs   Lab 02/07/25  0532 02/07/25  0539 02/08/25  0417 02/09/25  0249 02/10/25  0422 02/10/25  1510 02/11/25  0403     --    < > 143 140 139 139   K 4.3  --    < > 3.6 4.1 3.8 4.0     --    < > 105 105 99 104   CO2 24  --    < > 31* 29 32* 26   ANIONGAP 9  --    < > 7* 6* 8 9   BUN 15  --    < > 15 16 15 20   CREATININE 0.9  --     < > 0.8 0.8 0.8 0.8   *  --    < > 117* 90 128* 88   CALCIUM 8.8  --    < > 8.3* 8.7 9.3 8.7   PH  --  7.408  --   --   --   --   --    MG 1.8  --    < > 2.0 2.0  --  2.1   ALBUMIN 4.0  --   --   --   --  3.9  --    PROT 6.7  --   --   --   --  6.7  --    ALKPHOS 79  --   --   --   --  79  --    ALT 59*  --   --   --   --  30  --    AST 24  --   --   --   --  19  --    BILITOT 0.9  --   --   --   --  0.4  --     < > = values in this interval not displayed.       CARDIAC PROFILE LAST 3 DAYS  Recent Labs   Lab 02/07/25  0532 02/07/25  0537 02/07/25  1211   BNP  --  292*  --    TROPONINIHS 9.1  --  8.1       ENDOCRINE LAST 3 DAYS  Recent Labs   Lab 02/07/25  1211   TSH 4.370       LAST ARTERIAL BLOOD GAS  ABG  Recent Labs   Lab 02/07/25  0539   PH 7.408   PO2 68*   PCO2 37.3   HCO3 23.6*   BE -1       LAST 7 DAYS MICROBIOLOGY   Microbiology Results (last 7 days)       ** No results found for the last 168 hours. **            MOST RECENT IMAGING  Echo    Left Ventricle: The left ventricle is normal in size. Mildly increased   wall thickness. There is mild concentric hypertrophy. Normal wall motion.   There is normal systolic function with a visually estimated ejection   fraction of 60 - 65%. Unable to assess diastolic function due to atrial   fibrillation.    Right Ventricle: Normal right ventricular cavity size. Wall thickness   is normal. Systolic function is normal.    Left Atrium: Left atrium is moderately dilated.    Aortic Valve: The aortic valve is a trileaflet valve. There is mild   aortic valve sclerosis.    Mitral Valve: There is moderate posterior mitral annular calcification   present.    Tricuspid Valve: There is mild to moderate regurgitation with a   centrally directed jet.    Pulmonary Artery: There is mild pulmonary hypertension. The estimated   pulmonary artery systolic pressure is 37 mmHg.    IVC/SVC: Normal venous pressure at 3 mmHg.      ECHOCARDIOGRAM RESULTS (last 5)  Results for orders  placed during the hospital encounter of 02/07/25    Echo    Interpretation Summary    Left Ventricle: The left ventricle is normal in size. Mildly increased wall thickness. There is mild concentric hypertrophy. Normal wall motion. There is normal systolic function with a visually estimated ejection fraction of 60 - 65%. Unable to assess diastolic function due to atrial fibrillation.    Right Ventricle: Normal right ventricular cavity size. Wall thickness is normal. Systolic function is normal.    Left Atrium: Left atrium is moderately dilated.    Aortic Valve: The aortic valve is a trileaflet valve. There is mild aortic valve sclerosis.    Mitral Valve: There is moderate posterior mitral annular calcification present.    Tricuspid Valve: There is mild to moderate regurgitation with a centrally directed jet.    Pulmonary Artery: There is mild pulmonary hypertension. The estimated pulmonary artery systolic pressure is 37 mmHg.    IVC/SVC: Normal venous pressure at 3 mmHg.      Results for orders placed during the hospital encounter of 07/02/24    Echo    Interpretation Summary    Left Ventricle: The left ventricle is normal in size. Mildly increased wall thickness. There is mild concentric hypertrophy. Normal wall motion. There is normal systolic function with a visually estimated ejection fraction of 55 - 60%. There is normal diastolic function.    Right Ventricle: Normal right ventricular cavity size. Wall thickness is normal. Systolic function is normal.    Left Atrium: Left atrium is moderately dilated.    Aortic Valve: The aortic valve is a trileaflet valve. There is moderate aortic valve sclerosis.    Mitral Valve: There is mild posterior leaflet sclerosis.    Tricuspid Valve: There is mild to moderate regurgitation with a centrally directed jet.    Pulmonary Artery: The estimated pulmonary artery systolic pressure is 33 mmHg.    IVC/SVC: Normal venous pressure at 3 mmHg.      Results for orders placed during  the hospital encounter of 10/25/21    Transesophageal echo (EAMON) with possible cardioversion    Interpretation Summary  · The left ventricle is mildly enlarged with concentric remodeling and moderately decreased systolic function.  · Left ventricular diastolic dysfunction.  · There is severe left ventricular global hypokinesis.  · Mild right ventricular enlargement with normal right ventricular systolic function.  · Severe left atrial enlargement.  · Moderate right atrial enlargement.  · Moderate mitral regurgitation.  · Moderate tricuspid regurgitation.  · No interatrial septal defect present.  · Normal appearing left atrial appendage. No thrombus is present in the appendage. JACI occluder is absent. Abnormal appendage velocities.  · The estimated ejection fraction is 30%.  · A 150 J synchronized cardioversion was unsuccessful without restoration of normal sinus rhythm.  · A 200 J synchronized cardioversion was unsuccessful without restoration of normal sinus rhythm.  · A 200 J restored sinus rhythm with early recurrence of atrial fibrillation (ERAF).      Echo    Interpretation Summary  · The left ventricle is normal in size with moderate concentric hypertrophy and  · The estimated ejection fraction is 29%.  · There is moderate left ventricular global hypokinesis.  · Atrial fibrillation observed with restrictive filling pattern present.  · With elevated left atrial pressure.  · Normal right ventricular size with normal right ventricular systolic function.  · Mild left atrial enlargement.  · There is moderate aortic valve stenosis.  · Aortic valve area is 1.38 cm2; peak velocity is 1.53 m/s; mean gradient is 6 mmHg.  · Mild tricuspid regurgitation.  · Normal central venous pressure (3 mmHg).  · The estimated PA systolic pressure is 25 mmHg.      Results for orders placed in visit on 03/16/21    Echo Color Flow Doppler? Yes    Interpretation Summary  · The estimated PA systolic pressure is 30 mmHg.  · The left  ventricle is normal in size with mild concentric hypertrophy and normal systolic function. The estimated ejection fraction is 65%  · Indeterminate left ventricular diastolic function.  · Normal right ventricular size with normal right ventricular systolic function.  · Moderate left atrial enlargement.  · Mild to moderate tricuspid regurgitation.  · Mild aortic regurgitation.      CURRENT/PREVIOUS VISIT EKG  Results for orders placed or performed during the hospital encounter of 02/07/25   EKG 12-lead    Collection Time: 02/07/25  5:21 AM   Result Value Ref Range    QRS Duration 68 ms    [facility] QTC Calculation 418 ms    Narrative    Test Reason : R07.9,    Vent. Rate : 189 BPM     Atrial Rate : 202 BPM     P-R Int :    ms          QRS Dur :  68 ms      QT Int : 236 ms       P-R-T Axes :     38  46 degrees    QTcB Int : 418 ms    Atrial fibrillation with rapid ventricular response  Septal infarct (cited on or before 25-Oct-2021)  Abnormal ECG  When compared with ECG of 15-Feb-2024 20:31,  Vent. rate has increased by 100 bpm  Confirmed by Saul Cabrera ([id number]) on 2/8/2025 6:16:16 PM    Referred By: AAAREFERRAL SELF           Confirmed By: Saul Cabrera           ASSESSMENT/PLAN:     Active Hospital Problems    Diagnosis    *Acute on chronic diastolic congestive heart failure    Type 2 diabetes mellitus with both eyes affected by mild nonproliferative retinopathy without macular edema, without long-term current use of insulin    Atrial fibrillation with RVR    Obstructive sleep apnea syndrome    Hypertension       ASSESSMENT & PLAN:     Acute on chronic CHF  T2DM  Afib RVR  KATLYN  HTN      RECOMMENDATIONS:    Status post successful EAMON/CV this AM - 120J x1.   DC amio gtt and start PO amio 200 mg TID x 3 days BID x 21 days then daily thereafter.   Continue anticoagulation with Eliquis 5 mg BID.  Decrease PO Cardizem to 180 mg daily - hold for HR <60.  Continue asa/statin therapies.  Most recent ECHO showed preserved EF  60-65%.   Continue with Lasix 40 mg daily.   Would restart her daily PO mag oxide at dc.   Will need sleep study as an outpatient if not done already.   Possibly dc later this afternoon.     Lakia Friedman   Department of Cardiology  Date of Service: 02/11/2025      69-year-old with history of paroxysmal atrial fibrillation readmitted with recurrence of atrial fibrillation failed to convert to sinus rhythm with the intravenous amiodarone drip as well as intravenous Cardizem drip and mild pulmonary congestion elevated BNP on admission treated with Lasix intravenous.  Patient has been on Eliquis 5 mg p.o. b.i.d. with no obvious bleeding tendencies.  I have personally interviewed and examined the patient, I have reviewed the Nurse Practitioner's history and physical, assessment, and plan. I agree with the findings and plan.  I have personally reviewed the chest x-ray images and the laboratory profile noted to have mild elevation of ALT  Recommend to repeat the labs tomorrow  Tentatively plan for EAMON and cardioversion tomorrow  Risks and Benefits of the procedure have been explained to the patient. alternatives discussed in detail regarding upcoming procedures and sedation and possible complications. Some of the more  complications include but not limited to immediate or delayed perforation, bleeding, infections, pain, inadvertent injury to surrounding tissue / organs and possible need for surgical evaluation.To include but  not limited to oropharyngeal damage, tooth damage, aspiration, sore throat and risk of anesthesia have been explained to patient.  Other risks of conscious sedation to be explained by anesthesiologist.  All questions have been answered to patient's satisfaction Informed consent obtained.    02/11/2025:   Patient noted to have significant biatrial enlargement on transesophageal echocardiography moderately significant mitral and tricuspid insufficiency had successful electrical cardioversion with  restoration normal sinus rhythm however patient did not maintain sinus rhythm for long with the and a few hours.  LV function was preserved.    Recommend to continue on Eliquis 5 mg p.o. b.i.d. maintain on amiodarone loading dose continue on metoprolol for rate control and magnesium supplements  If the rate remains stable she can be discharged home with outpatient follow-up.  May consider outpatient electrical cardioversion again and in the meanwhile arrange for electrophysiologic evaluation as an outpatient and also arrange for evaluation with structural heart team as an outpatient.      Dr. Saul Cabrera   Department of Cardiology  Date of Service: 02/11/2025  12:36 PM

## 2025-02-11 NOTE — PLAN OF CARE
Problem: Physical Therapy  Goal: Physical Therapy Goal  Description: Goals to be met by: 3/10/25     Patient will increase functional independence with mobility by performin. Supine to sit with Supervision  2. Sit to stand transfer with Supervision  3. Bed to chair transfer with Supervision using least restrictive assistive device   4. Gait  x 150 feet with Supervision using least restrictive assistive device         Outcome: Progressing

## 2025-02-11 NOTE — PT/OT/SLP PROGRESS
Physical Therapy Treatment    Patient Name:  Courtney Nino   MRN:  [id number]    Recommendations:     Discharge Recommendations: No Therapy Indicated  Discharge Equipment Recommendations: shower chair  Barriers to discharge: None    Assessment:     Courtney Nino is a 69 y.o. female admitted with a medical diagnosis of Acute on chronic diastolic congestive heart failure.  She presents with the following impairments/functional limitations: weakness, impaired endurance, impaired self care skills, impaired functional mobility, gait instability, impaired balance, impaired cardiopulmonary response to activity.    Pt found supine upon arrival. Pt agreeable to therapy. Pt performed bed mobility with supervision. Pt performed sit<>stand with RW and ambulated ~200' with RW and CGA. Pt returned to room and returned supine in bed with all needs in reach.     Rehab Prognosis: Fair; patient would benefit from acute skilled PT services to address these deficits and reach maximum level of function.    Recent Surgery: * No surgery found *      Plan:     During this hospitalization, patient to be seen 3 x/week to address the identified rehab impairments via gait training, therapeutic activities, therapeutic exercises and progress toward the following goals:    Plan of Care Expires:  03/10/25    Subjective     Chief Complaint: none stated  Patient/Family Comments/goals: to go home  Pain/Comfort:  Pain Rating 1: 0/10      Objective:     Communicated with RN prior to session.  Patient found supine with telemetry, peripheral IV, pulse ox (continuous), blood pressure cuff upon PT entry to room.     General Precautions: Standard, fall  Orthopedic Precautions: N/A  Braces: N/A  Respiratory Status: Room air     Functional Mobility:  Bed Mobility:     Supine to Sit: supervision  Sit to Supine: supervision  Transfers:     Sit to Stand:  supervision with rolling walker  Gait: 200' with RW/CGA      AM-PAC 6 CLICK MOBILITY  Turning  over in bed (including adjusting bedclothes, sheets and blankets)?: 4  Sitting down on and standing up from a chair with arms (e.g., wheelchair, bedside commode, etc.): 4  Moving from lying on back to sitting on the side of the bed?: 4  Moving to and from a bed to a chair (including a wheelchair)?: 4  Need to walk in hospital room?: 3  Climbing 3-5 steps with a railing?: 3  Basic Mobility Total Score: 22       Treatment & Education:  Educated on PT POC.    Patient left supine with all lines intact and call button in reach..    GOALS:   Multidisciplinary Problems       Physical Therapy Goals          Problem: Physical Therapy    Goal Priority Disciplines Outcome Interventions   Physical Therapy Goal     PT, PT/OT Progressing    Description: Goals to be met by: 3/10/25     Patient will increase functional independence with mobility by performin. Supine to sit with Supervision  2. Sit to stand transfer with Supervision  3. Bed to chair transfer with Supervision using least restrictive assistive device   4. Gait  x 150 feet with Supervision using least restrictive assistive device                              DME Justifications:   Courtney's mobility limitation cannot be sufficiently resolved by the use of a cane. Her functional mobility deficit can be sufficiently resolved with the use of a Rolling Walker. Patient's mobility limitation significantly impairs their ability to participate in one of more activities of daily living.  The use of a RW will significantly improve the patient's ability to participate in MRADLS and the patient will use it on regular basis in the home.    Time Tracking:     PT Received On: 25  PT Start Time: 917     PT Stop Time: 927  PT Total Time (min): 10 min     Billable Minutes: Therapeutic Activity 10    Treatment Type: Treatment  PT/PTA: PT     Number of PTA visits since last PT visit: 0     2025

## 2025-02-11 NOTE — RESPIRATORY THERAPY
02/10/25 1946   Patient Assessment/Suction   Level of Consciousness (AVPU) alert   Respiratory Effort Normal;Unlabored   Expansion/Accessory Muscles/Retractions no use of accessory muscles   All Lung Fields Breath Sounds Anterior:;clear   Rhythm/Pattern, Respiratory unlabored;pattern regular;depth regular;no shortness of breath reported   Skin Integrity   $ Wound Care Tech Time 15 min   Area Observed Left;Right;Cheek;Behind ear;Upper lip;Nares   Skin Appearance without discoloration   PRE-TX-O2   Device (Oxygen Therapy) nasal cannula   Flow (L/min) (Oxygen Therapy) 2   SpO2 98 %   Pulse Oximetry Type Continuous   $ Pulse Oximetry - Multiple Charge Pulse Oximetry - Multiple   Pulse (!) 112   Resp 16   BP (!) 158/93   Preset CPAP/BiPAP Settings   Mode Of Delivery standby;BiPAP   $ CPAP/BiPAP Daily Charge 1   CPAP/BIPAP charged w/in last 24 h YES   $ Is patient using? No/refused   Reason patient is not wearing? Patient refused   Education   $ Education 15 min;Oxygen;BiPAP;Other (see comment)   Respiratory Evaluation   $ Care Plan Tech Time 15 min

## 2025-02-11 NOTE — DISCHARGE INSTRUCTIONS
Reduce caffeine intake.  Abstain from drinking alcohol.  Maintain daily blood pressure and heart rate log.    Please have outpatient Sleep study completed for sleep apnea evaluation.

## 2025-02-11 NOTE — PLAN OF CARE
reviewed the chart and spoke with the patient and the Attending MD. The patient is now medically cleared to discharge home. The patient's family will transport her home. Follow up apt scheduled and added to AVS. In basket sent to Cardiology to contact patient for hospital follow up. There are no further CM needs.    The patient is now cleared from a case management standpoint to discharge.        02/11/25 1515   Final Note   Assessment Type Final Discharge Note   Anticipated Discharge Disposition Home   Hospital Resources/Appts/Education Provided Appointments scheduled and added to AVS

## 2025-02-11 NOTE — CARE UPDATE
02/11/25 0800   Patient Assessment/Suction   Level of Consciousness (AVPU) alert   All Lung Fields Breath Sounds diminished   Skin Integrity   $ Wound Care Tech Time 15 min   Area Observed Bridge of nose;Cheek   Skin Appearance without discoloration   PRE-TX-O2   Device (Oxygen Therapy) nasal cannula   $ Is the patient on Low Flow Oxygen? Yes   Flow (L/min) (Oxygen Therapy) (S)  2  (placed on room air)   SpO2 99 %   Pulse Oximetry Type Continuous   Pulse 110   Resp 18

## 2025-02-11 NOTE — PLAN OF CARE
Problem: Occupational Therapy  Goal: Occupational Therapy Goal  Description: Goals to be met by: 3/10/2025     Patient will increase functional independence with ADLs by performing:    UE Dressing with Modified [place].  LE Dressing with Modified [place].  Grooming while standing at sink with Modified [place].  Toileting from toilet with Modified [place] for hygiene and clothing management.   Step transfer with Modified [place]  Toilet transfer to toilet with Modified [place].    Outcome: Progressing

## 2025-02-11 NOTE — PLAN OF CARE
Problem: Adult Inpatient Plan of Care  Goal: Plan of Care Review  Outcome: Met  Goal: Patient-Specific Goal (Individualized)  Outcome: Met  Goal: Absence of Hospital-Acquired Illness or Injury  Outcome: Met  Goal: Optimal Comfort and Wellbeing  Outcome: Met  Goal: Readiness for Transition of Care  Outcome: Met     Problem: Diabetes Comorbidity  Goal: Blood Glucose Level Within Targeted Range  Outcome: Met     Problem: Fall Injury Risk  Goal: Absence of Fall and Fall-Related Injury  Outcome: Met     Problem: Cardiac Rhythm Management Device  Goal: Optimal Adjustment to Device  Outcome: Met  Goal: Absence of Bleeding  Outcome: Met  Goal: Effective Device Function  Outcome: Met  Goal: Absence of Infection Signs and Symptoms  Outcome: Met  Goal: Acceptable Pain Level  Outcome: Met  Goal: Effective Oxygenation and Ventilation  Outcome: Met

## 2025-02-11 NOTE — TRANSFER OF CARE
"Anesthesia Transfer of Care Note    Patient: Courtney Nino    Procedure(s) Performed: * No procedures listed *    Patient location: Other: [facility]    Anesthesia Type: general    Transport from OR: Transported from OR on 6-10 L/min O2 by face mask with adequate spontaneous ventilation    Post pain: adequate analgesia    Post assessment: no apparent anesthetic complications and tolerated procedure well    Post vital signs: stable    Level of consciousness: awake, alert and oriented    Nausea/Vomiting: no nausea/vomiting    Complications: none    Transfer of care protocol was followed      Last vitals: Visit Vitals  BP (!) 125/59   Pulse 82   Temp 36.5 °C (97.7 °F) (Oral)   Resp (!) 29   Ht 5' 4" (1.626 m)   Wt 86.6 kg (190 lb 14.7 oz)   SpO2 97%   BMI 32.77 kg/m²     "

## 2025-02-11 NOTE — PT/OT/SLP EVAL
Occupational Therapy   Evaluation, tx    Name: Courtney Nino  MRN: [id number]  Admitting Diagnosis: Acute on chronic diastolic congestive heart failure  Recent Surgery: * No surgery found *    The primary encounter diagnosis was Acute on chronic diastolic congestive heart failure. Diagnoses of Chest pain, SOB (shortness of breath), Acute pulmonary edema, Respiratory distress, Acute respiratory failure with hypoxia, Congestive heart failure, Paroxysmal atrial fibrillation, Atrial fibrillation by electrocardiogram, and Atrial fibrillation with RVR were also pertinent to this visit.    Recommendations:     Discharge Recommendations: No Therapy Indicated  Discharge Equipment Recommendations:  shower chair  Barriers to discharge:  None    Assessment:     Courtney Nino is a 69 y.o. female with a medical diagnosis of Acute on chronic diastolic congestive heart failure.  She presents with  Performance deficits affecting function: weakness, impaired endurance, impaired self care skills, impaired functional mobility, gait instability, impaired balance, impaired cardiopulmonary response to activity.      Pt HR elevated w/ minimal activity however asymptomatic. Nurse notified and Informed OT that pt to have cardioversion 2/11/2025.     Rehab Prognosis: Good; patient would benefit from acute skilled OT services to address these deficits and reach maximum level of function.       Plan:     Patient to be seen 5 x/week to address the above listed problems via self-care/home management, therapeutic activities, therapeutic exercises  Plan of Care Expires: 03/10/25  Plan of Care Reviewed with: patient    Subjective     Chief Complaint: right chest wall pain.  Patient/Family Comments/goals: pt agreeable to OT.    Occupational Profile:  Living Environment: pt lives with her daughter and pt's 3 grandchildren ages 6 months, 1 yr, 1 y/o in a [facility] with no steps; WIS.  Previous level of function: Indep ADLs, ambulation w/o a  device; friends, cooks, cleans and caregiver of grandchildren.  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  Equipment Used at Home: CPAP  Assistance upon Discharge: daughter    Pain/Comfort:  Pain Rating 1: 0/10  Pain Rating Post-Intervention 1: 0/10    Patients cultural, spiritual, Spiritism conflicts given the current situation: no    Objective:     Communicated with: nurse prior to session.  Patient found HOB elevated with telemetry, peripheral IV, pulse ox (continuous), blood pressure cuff upon OT entry to room.    General Precautions: Standard, fall, diabetic  Orthopedic Precautions: N/A  Braces: N/A  Respiratory Status: Nasal cannula, flow 2 L/min    Occupational Performance:    Bed Mobility:    Patient completed Rolling/Turning to Right with supervision  Patient completed Scooting/Bridging with supervision  Patient completed Supine to Sit with supervision    Functional Mobility/Transfers:  Patient completed Sit <> Stand Transfer with stand by assistance  with  no assistive device   Patient completed Bed <> Chair Transfer using Step Transfer technique with stand by assistance with no assistive device  Patient completed Toilet Transfer Step Transfer technique with stand by assistance with  bedside commode  Functional Mobility: ambulated 10ft around bed w/ SBA to BS chair.     Activities of Daily Living:  Feeding:  independence .  Grooming: supervision seated  Lower Body Dressing: supervision donned/doffed sock seated EOB  Toileting: stand by assistance all aspects seated [facility]    Cognitive/Visual Perceptual:  Cognitive/Psychosocial Skills:     -       Oriented to: Person, Place, Time, and Situation   -       Follows Commands/attention:Follows multistep  commands  -       Communication: clear/fluent  -       Memory: No Deficits noted  -       Safety awareness/insight to disability: intact   -       Mood/Affect/Coping skills/emotional control: Appropriate to situation  Visual/Perceptual:      -Intact  .    Physical Exam:  Balance:    -       sitting: good  standing: good  dynamic good-  Postural examination/scapula alignment:    -       No postural abnormalities identified  Dominant hand:    -       right  Upper Extremity Range of Motion:     -       Right Upper Extremity: WFL  -       Left Upper Extremity: WFL  Upper Extremity Strength:    -       Right Upper Extremity: WFL  -       Left Upper Extremity: WFL   Strength:    -       Right Upper Extremity: WFL  -       Left Upper Extremity: WFL    AMPAC 6 Click ADL:  AMPAC Total Score: 21    Treatment & Education:  Pt seen for safe self care activities and fx mobility retraining as stated above.  All questions/concerns addressed within scope.  Call staff for BTB/OOB assist. Call bell use explained. Pt acknowledged.  Purpose of OT and POC  Impt of OOB activity and sitting UIC explained to pt to prevent negative effects of prolonged bed rest. Pt verbalized understanding and agreeable to sit UIC.   Pt educated on energy conservation tech to prevent over exertion /demand on cardiovascular system due to elevated HR w/ activity      Patient left up in chair with all lines intact, call button in reach, and nurse notified    GOALS:   Multidisciplinary Problems       Occupational Therapy Goals          Problem: Occupational Therapy    Goal Priority Disciplines Outcome Interventions   Occupational Therapy Goal     OT, PT/OT Progressing    Description: Goals to be met by: 3/10/2025     Patient will increase functional independence with ADLs by performing:    UE Dressing with Modified [place].  LE Dressing with Modified [place].  Grooming while standing at sink with Modified [place].  Toileting from toilet with Modified [place] for hygiene and clothing management.   Step transfer with Modified [place]  Toilet transfer to toilet with Modified [place].                         DME Justifications:  No DME recommended requiring DME  justifications    History:     Past Medical History:   Diagnosis Date    Cigarette nicotine dependence     Hypertension     Obesity     Vitamin D deficiency          Past Surgical History:   Procedure Laterality Date    BREAST SURGERY      knot on right    HYSTERECTOMY      TONSILLECTOMY         Time Tracking:     OT Date of Treatment: 02/10/25  OT Start Time: 1317  OT Stop Time: 1342  OT Total Time (min): 25 min    Billable Minutes:Evaluation 10  Self Care/Home Management 15  Total Time 25    2/10/2025

## 2025-02-11 NOTE — PROGRESS NOTES
[facility] Medicine  Progress Note    Patient Name: Courtney Nino  MRN: [id number]  Patient Class: IP- Inpatient   Admission Date: 2/7/2025  Length of Stay: 4 days  Attending Physician: Amor Lucero MD  Primary Care Provider: Jerrod Stubbs MD        Subjective     Principal Problem:Acute on chronic diastolic congestive heart failure        HPI:  Patient with atrial fibrillation, CHF, type 2 diabetes, hypertension presented with worsening shortness of breath over the last several days.  Patient reports she has been out of her medication for the last 2 weeks.  Her insurance kicked in the 1st of month.  Reports gradual progressive shortness of breath.  Reportedly was hypoxic in the emergency room, placed on oxygen.  Worsening shortness of breath so BiPAP was initiated.  Also developed atrial fibrillation with RVR, given amiodarone bolus without any improvement.  Started on Cardizem drip.  Cardiology consulted.  Chest x-ray showing fluid overload.  Received Lasix in the emergency room.  .    Overview/Hospital Course:  69-year-old female with PMH hypertension, AFib, CHF, DM, KATLYN on CPAP who was admitted for acute on chronic diastolic heart failure and AFib with RVR.  Has been out of most of her medicines for nearly 2 weeks.  She required BiPAP support.  Was on nitroglycerin GTT for very brief period for hypertension.  Given IV Lasix.  Required diltiazem GTT for rate control.  Resumed her regular home medicines.  Diltiazem GTT weaned off.  Cardiology was consulted.      Interval History:  Patient is seen and examined during multidisciplinary round.  Patient underwent DC cardioversion and maintaining normal sinus rhythm this morning.  Patient denies any chest pain, shortness of breath or any palpitations.      Objective:     Vital Signs (Most Recent):  Temp: 98.1 °F (36.7 °C) (02/11/25 0515)  Pulse: 100 (02/11/25 0000)  Resp: 16 (02/11/25 0000)  BP: 132/73 (02/11/25 0000)  SpO2:  (!) 93 % (02/11/25 0000) Vital Signs (24h Range):  Temp:  [97.9 °F (36.6 °C)-98.6 °F (37 °C)] 98.1 °F (36.7 °C)  Pulse:  [] 100  Resp:  [14-16] 16  SpO2:  [90 %-98 %] 93 %  BP: (117-158)/(71-93) 132/73     Weight: 86.6 kg (190 lb 14.7 oz)  Body mass index is 32.77 kg/m².    Intake/Output Summary (Last 24 hours) at 2/11/2025 0744  Last data filed at 2/11/2025 0216  Gross per 24 hour   Intake 991.09 ml   Output 2000 ml   Net -1008.91 ml         Physical Exam  Vitals reviewed.   Constitutional:       General: She is not in acute distress.  HENT:      Head: Normocephalic and atraumatic.   Cardiovascular:      Rate and Rhythm: Normal rate. Rhythm irregular.   Pulmonary:      Effort: Pulmonary effort is normal. No respiratory distress.      Breath sounds: Normal breath sounds.      Comments: Faint rales at bases  Neurological:      General: No focal deficit present.      Mental Status: She is alert and oriented to person, place, and time. Mental status is at baseline.   Psychiatric:         Mood and Affect: Affect normal.         Behavior: Behavior normal.         Thought Content: Thought content normal.             Significant Labs:  Lab Results   Component Value Date    WBC 9.58 02/08/2025    HGB 13.4 02/08/2025    HCT 41.1 02/08/2025    MCV 93 02/08/2025     02/08/2025       CMP  Sodium   Date Value Ref Range Status   02/11/2025 139 136 - 145 mmol/L Final     Potassium   Date Value Ref Range Status   02/11/2025 4.0 3.5 - 5.1 mmol/L Final     Chloride   Date Value Ref Range Status   02/11/2025 104 95 - 110 mmol/L Final     CO2   Date Value Ref Range Status   02/11/2025 26 23 - 29 mmol/L Final     Glucose   Date Value Ref Range Status   02/11/2025 88 70 - 110 mg/dL Final     BUN   Date Value Ref Range Status   02/11/2025 20 8 - 23 mg/dL Final     Creatinine   Date Value Ref Range Status   02/11/2025 0.8 0.5 - 1.4 mg/dL Final     Calcium   Date Value Ref Range Status   02/11/2025 8.7 8.7 - 10.5 mg/dL Final      Total Protein   Date Value Ref Range Status   02/10/2025 6.7 6.0 - 8.4 g/dL Final     Albumin   Date Value Ref Range Status   02/10/2025 3.9 3.5 - 5.2 g/dL Final     Total Bilirubin   Date Value Ref Range Status   02/10/2025 0.4 0.1 - 1.0 mg/dL Final     Comment:     For infants and newborns, interpretation of results should be based  on gestational age, weight and in agreement with clinical  observations.    Premature Infant recommended reference ranges:  Up to 24 hours.............<8.0 mg/dL  Up to 48 hours............<12.0 mg/dL  3-5 days..................<15.0 mg/dL  6-29 days.................<15.0 mg/dL       Alkaline Phosphatase   Date Value Ref Range Status   02/10/2025 79 55 - 135 U/L Final     AST   Date Value Ref Range Status   02/10/2025 19 10 - 40 U/L Final     ALT   Date Value Ref Range Status   02/10/2025 30 10 - 44 U/L Final     Anion Gap   Date Value Ref Range Status   02/11/2025 9 8 - 16 mmol/L Final     eGFR   Date Value Ref Range Status   02/11/2025 >60.0 >60 mL/min/1.73 m^2 Final     Microbiology Results (last 7 days)       ** No results found for the last 168 hours. **          Significant Imaging:  ECHO:    Left Ventricle: The left ventricle is normal in size. Mildly increased wall thickness. There is mild concentric hypertrophy. Normal wall motion. There is normal systolic function with a visually estimated ejection fraction of 60 - 65%. Unable to assess diastolic function due to atrial fibrillation.    Right Ventricle: Normal right ventricular cavity size. Wall thickness is normal. Systolic function is normal.    Left Atrium: Left atrium is moderately dilated.    Aortic Valve: The aortic valve is a trileaflet valve. There is mild aortic valve sclerosis.    Mitral Valve: There is moderate posterior mitral annular calcification present.    Tricuspid Valve: There is mild to moderate regurgitation with a centrally directed jet.    Pulmonary Artery: There is mild pulmonary hypertension. The  estimated pulmonary artery systolic pressure is 37 mmHg.    IVC/SVC: Normal venous pressure at 3 mmHg.    CXR: Radiographic findings compatible with mild CHF/volume overload       Assessment and Plan     * Acute on chronic diastolic congestive heart failure  Acute Respiratory Failure With Hypoxia diagnosis   Nearly resolved.  Still some shortness of breath  -continue oral Lasix 40 mg daily, currently good urine output from this  -monitor I/O  -fluid restrict 1.5 L  -new echo obtained and similar to prior - good EF, unable to assess diastole, other mild-to-moderate findings  -cardiology was consulted    Type 2 diabetes mellitus with both eyes affected by mild nonproliferative retinopathy without macular edema, without long-term current use of insulin  Patient's FSGs are controlled on current medication regimen.  Last A1c reviewed-   Lab Results   Component Value Date    HGBA1C 5.6 02/07/2025     Most recent fingerstick glucose reviewed-   Recent Labs   Lab 02/09/25  1223 02/09/25  1636   POCTGLUCOSE 120* 113*       Current correctional scale  Medium  Maintain anti-hyperglycemic dose as follows-   Antihyperglycemics (From admission, onward)      Start     Stop Route Frequency Ordered    02/07/25 0903  insulin aspart U-100 pen 0-10 Units         -- SubQ Before meals & nightly PRN 02/07/25 0803          Hold Oral hypoglycemics while patient is in the hospital.    Atrial fibrillation with RVR  Patient was initially treated with Cardizem infusion which was later transitioned to intravenous amiodarone infusion and underwent DC cardioversion which has been successful on February 11, 2025.  -continue home digoxin, diltiazem, metoprolol.  -continue therapeutic Lovenox, can resume home Eliquis on discharge  -telemetry  -cardiology consulted  -optimize electrolytes    Obstructive sleep apnea syndrome  Use a CPAP at home  -BiPAP nighttime and p.r.n.    Hypertension  Controlled.   -continue home diltiazem and  "metoprolol    Discussed with bedside nurse and .  VTE Risk Mitigation (From admission, onward)           Ordered     apixaban tablet 5 mg  2 times daily        Note to Pharmacy: Ht: 5' 4" (1.626 m)  Wt: 86.6 kg (190 lb 14.7 oz)  Estimated Creatinine Clearance: 72.7 mL/min (based on SCr of 0.8 mg/dL).    02/10/25 1050     IP VTE HIGH RISK PATIENT  Once         02/07/25 0803     Place sequential compression device  Until discontinued         02/07/25 0803                    Discharge Planning   NICKO: 2/11/2025     Code Status: Full Code   Medical Readiness for Discharge Date: 2/9/2025  Discharge Plan A: Home   Discharge Delays: (!) Patient and Family Barriers            Please place Justification for DME        Amor Lucero MD  Department of Hospital Medicine   [facility]    "

## 2025-02-11 NOTE — DISCHARGE SUMMARY
[facility] Medicine  Discharge Summary      Patient Name: Courtney Nino  MRN: [id number]  DAVID: [id number]  Patient Class: IP- Inpatient  Admission Date: 2/7/2025  Hospital Length of Stay: 4 days  Discharge Date and Time:  02/11/2025 2:22 PM  Attending Physician: Amor Lucero MD   Discharging Provider: Amor Lucero MD  Primary Care Provider: Jerrod Stubbs MD    Primary Care Team: Networked reference to record PCT     HPI:   Patient with atrial fibrillation, CHF, type 2 diabetes, hypertension presented with worsening shortness of breath over the last several days.  Patient reports she has been out of her medication for the last 2 weeks.  Her insurance kicked in the 1st of month.  Reports gradual progressive shortness of breath.  Reportedly was hypoxic in the emergency room, placed on oxygen.  Worsening shortness of breath so BiPAP was initiated.  Also developed atrial fibrillation with RVR, given amiodarone bolus without any improvement.  Started on Cardizem drip.  Cardiology consulted.  Chest x-ray showing fluid overload.  Received Lasix in the emergency room.  .    * No surgery found *      Hospital Course:   69-year-old female with PMH hypertension, AFib, CHF, DM, KATLYN on CPAP who was admitted for acute on chronic diastolic heart failure and AFib with RVR.  Patient was out of most of her medicines for nearly 2 weeks.  She required BiPAP support.  Was on nitroglycerin GTT for very brief period for hypertension.  Given IV Lasix.  Required diltiazem GTT for rate control.  Resumed her regular home medicines.  Diltiazem GTT weaned off.  Cardiology was consulted.  Later patient was initiated on intravenous amiodarone infusion.  Patient underwent successful DC cardioversion performed by Cardiology on February 11, 2025.  Medication compliance discussed with the patient.  Patient encouraged to have outpatient sleep study completed.  New prescriptions for amiodarone and oral Cardizem  (reduction in dose) given to the patient.  Patient to continue close follow-up with primary care physician and cardiologist upon discharge.  See discharge orders for details.    Goals of Care Treatment Preferences:  Code Status: Full Code      SDOH Screening:  The patient was screened for food insecurity, housing instability, transportation needs, utility difficulties, and interpersonal safety. The social determinant(s) of health identified as a concern this admission are:  Housing instability  Food insecurity  Utility difficulties  Transportation difficulties    Will discuss with case management and/or community health workers.    Social Drivers of Health with Concerns     Food Insecurity: Food Insecurity Present (2/7/2025)   Housing Stability: High Risk (2/7/2025)   Transportation Needs: Unmet Transportation Needs (2/7/2025)   Utilities: At Risk (2/7/2025)        Consults:   Consults (From admission, onward)          Status Ordering Provider     Inpatient consult to Midline team  Once        Provider:  (Not yet assigned)    Acknowledged CESAR GUPTA     Inpatient consult to Anesthesiology  Once        Provider:  Cali Araiza MD    Acknowledged LYNN THOMAS     Inpatient consult to Cardiology  Once        Provider:  Sushil Talbert MD    Completed LYNN THOMAS     Inpatient consult to Social Work/Case Management  Once        Provider:  (Not yet assigned)    Acknowledged NEY BLANCO     Inpatient consult to Registered Dietitian/Nutritionist  Once        Provider:  (Not yet assigned)    Completed NEY BLANCO     Inpatient consult to Cardiology  Once        Provider:  Estela Tejada MD    Completed NEY BLANCO            * Acute on chronic diastolic congestive heart failure  Nearly resolved.  Still some shortness of breath  -continue oral Lasix 40 mg daily, currently good urine output from this  -monitor I/O  -fluid restrict 1.5 L  -new echo obtained and similar to prior - good EF, unable  to assess diastole, other mild-to-moderate findings  -cardiology was consulted    Type 2 diabetes mellitus with both eyes affected by mild nonproliferative retinopathy without macular edema, without long-term current use of insulin  Patient's FSGs are controlled on current medication regimen.  Last A1c reviewed-   Lab Results   Component Value Date    HGBA1C 5.6 02/07/2025     Most recent fingerstick glucose reviewed-   Recent Labs   Lab 02/09/25  1223 02/09/25  1636   POCTGLUCOSE 120* 113*       Current correctional scale  Medium  Maintain anti-hyperglycemic dose as follows-   Antihyperglycemics (From admission, onward)      Start     Stop Route Frequency Ordered    02/07/25 0903  insulin aspart U-100 pen 0-10 Units         -- SubQ Before meals & nightly PRN 02/07/25 0803          Hold Oral hypoglycemics while patient is in the hospital.    Atrial fibrillation with RVR  Patient was initially treated with Cardizem infusion which was later transitioned to intravenous amiodarone infusion and underwent DC cardioversion which has been successful on February 11, 2025.  -continue home digoxin, diltiazem, metoprolol.  -continue therapeutic Lovenox, can resume home Eliquis on discharge  -telemetry  -cardiology consulted  -optimize electrolytes    Obstructive sleep apnea syndrome  Use a CPAP at home  -BiPAP nighttime and p.r.n.    Hypertension  Controlled.   -continue home diltiazem and metoprolol      Final Active Diagnoses:    Diagnosis Date Noted POA    PRINCIPAL PROBLEM:  Acute on chronic diastolic congestive heart failure [I50.33] 02/07/2025 Yes    Type 2 diabetes mellitus with both eyes affected by mild nonproliferative retinopathy without macular edema, without long-term current use of insulin [E11.3293] 02/24/2023 Yes    Atrial fibrillation with RVR [I48.91] 10/25/2021 Yes    Obstructive sleep apnea syndrome [G47.33] 09/23/2021 Yes    Hypertension [I10] 02/11/2021 Yes      Problems Resolved During this Admission:     Diagnosis Date Noted Date Resolved POA    Hypokalemia [E87.6] 02/08/2025 02/09/2025 No    Acute pulmonary edema [J81.0] 02/07/2025 02/08/2025 Yes       Discharged Condition: good    Disposition: Home or Self Care    Follow Up:   Follow-up Information       Jerrod Stubbs MD. Go on 2/18/2025.    Specialty: Family Medicine  Why: please go to hospital follow up appointment at 9AM  Contact information:  [address]  [address]  [place] [address]  [phone]               Geovanna Morales NP. Schedule an appointment as soon as possible for a visit in 2 week(s).    Specialty: Cardiology  Why: In basket message sent to clinic. Clinic to contact pt with appointment date and time. if you have not heard from clinic within 3 days of discharge please reach out to them to schedule.  Contact information:  [address]  [address]  [place] [address]  [phone]                           Patient Instructions:      Diet Cardiac     Diet Cardiac   Order Comments: Patient to monitor daily weight, follow low salt diet and in case if gain more than 3 pounds in 24 hours, please call your doctor for further advice.     Diet diabetic     Call MD for:  temperature >100.4     Call MD for:  persistent nausea and vomiting or diarrhea     Call MD for:  severe uncontrolled pain     Call MD for:  redness, tenderness, or signs of infection (pain, swelling, redness, odor or green/yellow discharge around incision site)     Call MD for:  difficulty breathing or increased cough     Call MD for:  severe persistent headache     Call MD for:  worsening rash     Call MD for:  persistent dizziness, light-headedness, or visual disturbances     Call MD for:  increased confusion or weakness     Notify your health care provider if you experience any of the following:  temperature >100.4     Notify your health care provider if you experience any of the following:  persistent nausea and vomiting or diarrhea     Notify your health care provider if you  "experience any of the following:  severe uncontrolled pain     Notify your health care provider if you experience any of the following:  difficulty breathing or increased cough     Notify your health care provider if you experience any of the following:  redness, tenderness, or signs of infection (pain, swelling, redness, odor or green/yellow discharge around incision site)     Notify your health care provider if you experience any of the following:  severe persistent headache     Notify your health care provider if you experience any of the following:  worsening rash     Notify your health care provider if you experience any of the following:  persistent dizziness, light-headedness, or visual disturbances     Notify your health care provider if you experience any of the following:  increased confusion or weakness     Activity as tolerated     Activity as tolerated   Order Comments: Fall precautions       Significant Diagnostic Studies: Labs: CMP   Recent Labs   Lab 02/10/25  0422 02/10/25  1510 02/11/25  0403    139 139   K 4.1 3.8 4.0    99 104   CO2 29 32* 26   GLU 90 128* 88   BUN 16 15 20   CREATININE 0.8 0.8 0.8   CALCIUM 8.7 9.3 8.7   PROT  --  6.7  --    ALBUMIN  --  3.9  --    BILITOT  --  0.4  --    ALKPHOS  --  79  --    AST  --  19  --    ALT  --  30  --    ANIONGAP 6* 8 9   , CBC No results for input(s): "WBC", "HGB", "HCT", "PLT" in the last 48 hours., INR   Lab Results   Component Value Date    INR 1.1 02/07/2025    INR 1.1 10/25/2021   , Lipid Panel   Lab Results   Component Value Date    CHOL 223 (H) 03/24/2023    HDL 35 (L) 03/24/2023    LDLCALC 152.0 03/24/2023    TRIG 180 (H) 03/24/2023    CHOLHDL 15.7 (L) 03/24/2023   , Troponin No results for input(s): "TROPONINI" in the last 168 hours., and A1C:   Recent Labs   Lab 02/07/25  1211   HGBA1C 5.6       Pending Diagnostic Studies:       Procedure Component Value Units Date/Time    EKG 12-lead [[id number]]     Order Status: Sent Lab " Status: No result            ECHO:    Left Ventricle: The left ventricle is normal in size. Mildly increased wall thickness. There is mild concentric hypertrophy. Normal wall motion. There is normal systolic function with a visually estimated ejection fraction of 60 - 65%. Unable to assess diastolic function due to atrial fibrillation.    Right Ventricle: Normal right ventricular cavity size. Wall thickness is normal. Systolic function is normal.    Left Atrium: Left atrium is moderately dilated.    Aortic Valve: The aortic valve is a trileaflet valve. There is mild aortic valve sclerosis.    Mitral Valve: There is moderate posterior mitral annular calcification present.    Tricuspid Valve: There is mild to moderate regurgitation with a centrally directed jet.    Pulmonary Artery: There is mild pulmonary hypertension. The estimated pulmonary artery systolic pressure is 37 mmHg.    IVC/SVC: Normal venous pressure at 3 mmHg.     CXR: Radiographic findings compatible with mild CHF/volume overload     Medications:  Reconciled Home Medications:      Medication List        START taking these medications      amiodarone 200 MG Tab  Commonly known as: PACERONE  Take 1 tablet by mouth 3 times daily for 3 days and then  Take 1 tablet by mouth twice daily for 21 days and then  Take 1 tablet by mouth once daily.     aspirin 81 MG EC tablet  Commonly known as: ECOTRIN  Take 1 tablet (81 mg total) by mouth once daily.            CHANGE how you take these medications      diltiaZEM 180 MG 24 hr capsule  Commonly known as: CARDIZEM CD  Take 1 capsule (180 mg total) by mouth once daily. Hold if heart rate < 60/min  Start taking on: February 12, 2025  What changed:   medication strength  how much to take  additional instructions     furosemide 40 MG tablet  Commonly known as: LASIX  Take 1 tablet (40 mg total) by mouth once daily.  Start taking on: February 12, 2025  What changed: when to take this            CONTINUE taking these  medications      apixaban 5 mg Tab  Commonly known as: ELIQUIS  Take 1 tablet (5 mg total) by mouth 2 (two) times daily.     blood-glucose meter kit  To check BG 2 times daily, to use with insurance preferred meter     digoxin 125 mcg tablet  Commonly known as: LANOXIN  Take 1 tablet (0.125 mg total) by mouth once daily.     DROPSAFE ALCOHOL PREP PADS Padm  Generic drug: alcohol swabs  USE TOPICALLY AS NEEDED AS DIRECTED     fish oil-omega-3 fatty acids 300-1,000 mg capsule  Take 1 capsule by mouth once daily.     ibandronate 150 mg tablet  Commonly known as: BONIVA  Take 1 tablet (150 mg total) by mouth every 30 days.     * lancets Misc  To check BG 2 times daily, to use with insurance preferred meter     * TRUEPLUS LANCETS 33 gauge Misc  Generic drug: lancets  TEST BLOOD SUGAR TWICE DAILY     magnesium oxide 400 mg (241.3 mg magnesium) tablet  Commonly known as: MAG-OX  Take 1 tablet (400 mg total) by mouth 2 (two) times daily.     metoprolol succinate 50 MG 24 hr tablet  Commonly known as: TOPROL-XL  Take 1 tablet (50 mg total) by mouth once daily.     potassium chloride SA 20 MEQ tablet  Commonly known as: K-DUR,KLOR-CON  Take 20 mEq by mouth 2 (two) times daily.     pravastatin 40 MG tablet  Commonly known as: PRAVACHOL  TAKE 1 TABLET EVERY DAY     TRUE METRIX GLUCOSE TEST STRIP Strp  Generic drug: blood sugar diagnostic  TEST BLOOD SUGAR TWICE DAILY     TRULICITY 3 mg/0.5 mL pen injector  Generic drug: dulaglutide  Inject 3 mg into the skin every 7 days.     vitamin E 1000 UNIT capsule  Take 1,000 Units by mouth once daily.           * This list has 2 medication(s) that are the same as other medications prescribed for you. Read the directions carefully, and ask your doctor or other care provider to review them with you.                STOP taking these medications      glimepiride 2 MG tablet  Commonly known as: AMARYL     losartan 50 MG tablet  Commonly known as: COZAAR     metFORMIN 500 MG tablet  Commonly  known as: GLUCOPHAGE              Indwelling Lines/Drains at time of discharge:   Lines/Drains/Airways       None                   Time spent on the discharge of patient: 34 minutes         Amor Lucero MD  Department of Hospital Medicine  Formerly [facility], [facility]

## 2025-02-11 NOTE — ANESTHESIA POSTPROCEDURE EVALUATION
Anesthesia Post Evaluation    Patient: Courtney Nino    Procedure(s) Performed: * No procedures listed *    Final Anesthesia Type: general      Patient location during evaluation: telemetry step down  Patient participation: Yes- Able to Participate  Level of consciousness: awake and alert  Post-procedure vital signs: reviewed and stable  Pain management: adequate  Airway patency: patent    PONV status at discharge: No PONV  Anesthetic complications: no      Cardiovascular status: hemodynamically stable  Respiratory status: unassisted, spontaneous ventilation and room air  Hydration status: euvolemic  Follow-up not needed.              Vitals Value Taken Time   /82 02/11/25 1045   Temp 36.5 °C (97.7 °F) 02/11/25 0700   Pulse 69 02/11/25 1047   Resp 28 02/11/25 1047   SpO2 100 % 02/11/25 1047   Vitals shown include unfiled device data.      No case tracking events are documented in the log.      Pain/Cortney Score: No data recorded

## 2025-02-11 NOTE — SUBJECTIVE & OBJECTIVE
Interval History:  Patient is seen and examined during multidisciplinary round.  Patient underwent DC cardioversion and maintaining normal sinus rhythm this morning.  Patient denies any chest pain, shortness of breath or any palpitations.      Objective:     Vital Signs (Most Recent):  Temp: 98.1 °F (36.7 °C) (02/11/25 0515)  Pulse: 100 (02/11/25 0000)  Resp: 16 (02/11/25 0000)  BP: 132/73 (02/11/25 0000)  SpO2: (!) 93 % (02/11/25 0000) Vital Signs (24h Range):  Temp:  [97.9 °F (36.6 °C)-98.6 °F (37 °C)] 98.1 °F (36.7 °C)  Pulse:  [] 100  Resp:  [14-16] 16  SpO2:  [90 %-98 %] 93 %  BP: (117-158)/(71-93) 132/73     Weight: 86.6 kg (190 lb 14.7 oz)  Body mass index is 32.77 kg/m².    Intake/Output Summary (Last 24 hours) at 2/11/2025 0744  Last data filed at 2/11/2025 0216  Gross per 24 hour   Intake 991.09 ml   Output 2000 ml   Net -1008.91 ml         Physical Exam  Vitals reviewed.   Constitutional:       General: She is not in acute distress.  HENT:      Head: Normocephalic and atraumatic.   Cardiovascular:      Rate and Rhythm: Normal rate. Rhythm irregular.   Pulmonary:      Effort: Pulmonary effort is normal. No respiratory distress.      Breath sounds: Normal breath sounds.      Comments: Faint rales at bases  Neurological:      General: No focal deficit present.      Mental Status: She is alert and oriented to person, place, and time. Mental status is at baseline.   Psychiatric:         Mood and Affect: Affect normal.         Behavior: Behavior normal.         Thought Content: Thought content normal.             Significant Labs:  Lab Results   Component Value Date    WBC 9.58 02/08/2025    HGB 13.4 02/08/2025    HCT 41.1 02/08/2025    MCV 93 02/08/2025     02/08/2025       CMP  Sodium   Date Value Ref Range Status   02/11/2025 139 136 - 145 mmol/L Final     Potassium   Date Value Ref Range Status   02/11/2025 4.0 3.5 - 5.1 mmol/L Final     Chloride   Date Value Ref Range Status   02/11/2025 104  95 - 110 mmol/L Final     CO2   Date Value Ref Range Status   02/11/2025 26 23 - 29 mmol/L Final     Glucose   Date Value Ref Range Status   02/11/2025 88 70 - 110 mg/dL Final     BUN   Date Value Ref Range Status   02/11/2025 20 8 - 23 mg/dL Final     Creatinine   Date Value Ref Range Status   02/11/2025 0.8 0.5 - 1.4 mg/dL Final     Calcium   Date Value Ref Range Status   02/11/2025 8.7 8.7 - 10.5 mg/dL Final     Total Protein   Date Value Ref Range Status   02/10/2025 6.7 6.0 - 8.4 g/dL Final     Albumin   Date Value Ref Range Status   02/10/2025 3.9 3.5 - 5.2 g/dL Final     Total Bilirubin   Date Value Ref Range Status   02/10/2025 0.4 0.1 - 1.0 mg/dL Final     Comment:     For infants and newborns, interpretation of results should be based  on gestational age, weight and in agreement with clinical  observations.    Premature Infant recommended reference ranges:  Up to 24 hours.............<8.0 mg/dL  Up to 48 hours............<12.0 mg/dL  3-5 days..................<15.0 mg/dL  6-29 days.................<15.0 mg/dL       Alkaline Phosphatase   Date Value Ref Range Status   02/10/2025 79 55 - 135 U/L Final     AST   Date Value Ref Range Status   02/10/2025 19 10 - 40 U/L Final     ALT   Date Value Ref Range Status   02/10/2025 30 10 - 44 U/L Final     Anion Gap   Date Value Ref Range Status   02/11/2025 9 8 - 16 mmol/L Final     eGFR   Date Value Ref Range Status   02/11/2025 >60.0 >60 mL/min/1.73 m^2 Final     Microbiology Results (last 7 days)       ** No results found for the last 168 hours. **          Significant Imaging:  ECHO:    Left Ventricle: The left ventricle is normal in size. Mildly increased wall thickness. There is mild concentric hypertrophy. Normal wall motion. There is normal systolic function with a visually estimated ejection fraction of 60 - 65%. Unable to assess diastolic function due to atrial fibrillation.    Right Ventricle: Normal right ventricular cavity size. Wall thickness is normal.  Systolic function is normal.    Left Atrium: Left atrium is moderately dilated.    Aortic Valve: The aortic valve is a trileaflet valve. There is mild aortic valve sclerosis.    Mitral Valve: There is moderate posterior mitral annular calcification present.    Tricuspid Valve: There is mild to moderate regurgitation with a centrally directed jet.    Pulmonary Artery: There is mild pulmonary hypertension. The estimated pulmonary artery systolic pressure is 37 mmHg.    IVC/SVC: Normal venous pressure at 3 mmHg.    CXR: Radiographic findings compatible with mild CHF/volume overload

## 2025-02-12 ENCOUNTER — PATIENT OUTREACH (OUTPATIENT)
Dept: ADMINISTRATIVE | Facility: CLINIC | Age: 70
End: 2025-02-12
Payer: MEDICARE

## 2025-02-12 NOTE — PT/OT/SLP PROGRESS
Occupational Therapy   Treatment    Name: Courtney Nino  MRN: [id number]  Admitting Diagnosis:  Acute on chronic diastolic congestive heart failure     The primary encounter diagnosis was Acute on chronic diastolic congestive heart failure. Diagnoses of Chest pain, SOB (shortness of breath), Acute pulmonary edema, Respiratory distress, Acute respiratory failure with hypoxia, Congestive heart failure, Paroxysmal atrial fibrillation, Atrial fibrillation by electrocardiogram, Atrial fibrillation with RVR, and Afib were also pertinent to this visit.    Recommendations:     Discharge Recommendations: No Therapy Indicated  Discharge Equipment Recommendations:  shower chair  Barriers to discharge:  None    Assessment:     Courtney Nino is a 69 y.o. female with a medical diagnosis of Acute on chronic diastolic congestive heart failure.  She presents with Performance deficits affecting function are weakness, impaired endurance, impaired self care skills, impaired functional mobility, gait instability, impaired balance, impaired cardiopulmonary response to activity.     Pt ambulated supervision in room, slow and using touch points after CV procedure this date. Pt declined toileting and g/hygiene. Pt provided with extensive education on energy conservation tech and handout. HR still elevated w/ minimal activity. Nurse made aware. Pt to dc home this date.     Rehab Prognosis:  Good; patient would benefit from acute skilled OT services to address these deficits and reach maximum level of function.       Plan:     Patient to be seen 5 x/week to address the above listed problems via self-care/home management, therapeutic activities, therapeutic exercises  Plan of Care Expires: 03/10/25  Plan of Care Reviewed with: patient    Subjective     Chief Complaint: none  Patient/Family Comments/goals: I am going home today hopefully.  Pain/Comfort:  Pain Rating 1: 0/10  Pain Rating Post-Intervention 1: 0/10    Objective:      Communicated with: nurse prior to session.  Patient found HOB elevated with telemetry, blood pressure cuff, peripheral IV, pulse ox (continuous) upon OT entry to room.    General Precautions: Standard, fall, diabetic (fluid restriction)    Orthopedic Precautions:N/A  Braces: N/A  Respiratory Status: Room air     Occupational Performance:     Bed Mobility:    Patient completed Rolling/Turning to Left with  independence  Patient completed Scooting/Bridging with independence  Patient completed Supine to Sit with independence     Functional Mobility/Transfers:  Patient completed Sit <> Stand Transfer with independence  with  no assistive device   Functional Mobility: supervision 15 ft, slow, using touch points for stability.    Activities of Daily Living:  Grooming: declined   Upper Body Dressing: modified independence to don pullover shirt  Toileting: declined       [facility] 6 Click ADL: 24    Treatment & Education:  Pt seen for safe self care activities and fx mobility retraining as stated above.  All questions/concerns addressed within scope.  Call staff for BTB/OOB assist. Call bell use explained. Pt acknowledged.  Purpose of OT and POC  Pt educated on energy conservation tech and handout issued.       Patient left sitting edge of bed with all lines intact, call button in reach, and nurse notified    GOALS:   Multidisciplinary Problems       Occupational Therapy Goals          Problem: Occupational Therapy    Goal Priority Disciplines Outcome Interventions   Occupational Therapy Goal     OT, PT/OT Adequate for Care Transition    Description: Goals to be met by: 3/10/2025     Patient will increase functional independence with ADLs by performing:    UE Dressing with Modified [place].  LE Dressing with Modified [place].  Grooming while standing at sink with Modified [place].  Toileting from toilet with Modified [place] for hygiene and clothing management.   Step transfer with Modified  [place]  Toilet transfer to toilet with Modified [place].                         DME Justifications:  No DME recommended requiring DME justifications    Time Tracking:     OT Date of Treatment: 02/11/25  OT Start Time: 1551  OT Stop Time: 1614  OT Total Time (min): 23 min    Billable Minutes:Self Care/Home Management 10  Therapeutic Activity 13  Total Time 23    OT/CESAR: OT          2/11/2025

## 2025-02-12 NOTE — PROGRESS NOTES
C3 nurse attempted to contact Courtney Nino  for a TCC post hospital discharge follow up call. No answer. Left voicemail with callback information. The patient has a scheduled HOS appointment with Jerrod Stubbs MD on 02/18/2025 at 0900.

## 2025-02-12 NOTE — PLAN OF CARE
Problem: Occupational Therapy  Goal: Occupational Therapy Goal  Description: Goals to be met by: 3/10/2025     Patient will increase functional independence with ADLs by performing:    UE Dressing with Modified [place].  LE Dressing with Modified [place].  Grooming while standing at sink with Modified [place].  Toileting from toilet with Modified [place] for hygiene and clothing management.   Step transfer with Modified [place]  Toilet transfer to toilet with Modified [place].    Outcome: Adequate for Care Transition

## 2025-02-13 NOTE — PROGRESS NOTES
2nd attempt made to reach patient for TCC call.  Left voicemail please call [phone] leave first name, last name and  and I will return your call.

## 2025-02-14 NOTE — PROGRESS NOTES
3rd attempt made to reach patient for TCC call. Left voicemail please call [phone] leave first name, last name, and  and I will return your call.

## 2025-02-14 NOTE — PROGRESS NOTES
C3 nurse spoke with Courtney Nino  for a TCC post hospital discharge follow up call. The patient has a scheduled [facility] appointment with Jerrod Stubbs MD  on 2/18/2025 @ 0900.

## 2025-02-18 ENCOUNTER — OFFICE VISIT (OUTPATIENT)
Dept: FAMILY MEDICINE | Facility: CLINIC | Age: 70
End: 2025-02-18
Payer: MEDICARE

## 2025-02-18 VITALS
SYSTOLIC BLOOD PRESSURE: 115 MMHG | DIASTOLIC BLOOD PRESSURE: 73 MMHG | HEART RATE: 86 BPM | WEIGHT: 197.19 LBS | BODY MASS INDEX: 33.66 KG/M2 | TEMPERATURE: 98 F | OXYGEN SATURATION: 98 % | RESPIRATION RATE: 16 BRPM | HEIGHT: 64 IN

## 2025-02-18 DIAGNOSIS — M65.342 TRIGGER RING FINGER OF LEFT HAND: ICD-10-CM

## 2025-02-18 DIAGNOSIS — E11.3293 TYPE 2 DIABETES MELLITUS WITH BOTH EYES AFFECTED BY MILD NONPROLIFERATIVE RETINOPATHY WITHOUT MACULAR EDEMA, WITHOUT LONG-TERM CURRENT USE OF INSULIN: ICD-10-CM

## 2025-02-18 DIAGNOSIS — I50.9 CONGESTIVE HEART FAILURE, UNSPECIFIED HF CHRONICITY, UNSPECIFIED HEART FAILURE TYPE: ICD-10-CM

## 2025-02-18 DIAGNOSIS — I10 PRIMARY HYPERTENSION: ICD-10-CM

## 2025-02-18 DIAGNOSIS — I48.91 ATRIAL FIBRILLATION WITH RVR: Primary | ICD-10-CM

## 2025-02-18 DIAGNOSIS — E66.813 CLASS 3 SEVERE OBESITY WITH BODY MASS INDEX (BMI) OF 40.0 TO 44.9 IN ADULT, UNSPECIFIED OBESITY TYPE, UNSPECIFIED WHETHER SERIOUS COMORBIDITY PRESENT: ICD-10-CM

## 2025-02-18 DIAGNOSIS — E55.9 VITAMIN D DEFICIENCY: ICD-10-CM

## 2025-02-18 DIAGNOSIS — E66.01 CLASS 3 SEVERE OBESITY WITH BODY MASS INDEX (BMI) OF 40.0 TO 44.9 IN ADULT, UNSPECIFIED OBESITY TYPE, UNSPECIFIED WHETHER SERIOUS COMORBIDITY PRESENT: ICD-10-CM

## 2025-02-18 DIAGNOSIS — F17.210 CIGARETTE SMOKER: ICD-10-CM

## 2025-02-18 DIAGNOSIS — I50.32 CHRONIC DIASTOLIC CONGESTIVE HEART FAILURE: ICD-10-CM

## 2025-02-18 DIAGNOSIS — I50.33 ACUTE ON CHRONIC DIASTOLIC CONGESTIVE HEART FAILURE: ICD-10-CM

## 2025-02-18 DIAGNOSIS — I10 ESSENTIAL HYPERTENSION: ICD-10-CM

## 2025-02-18 RX ORDER — LANOLIN ALCOHOL/MO/W.PET/CERES
400 CREAM (GRAM) TOPICAL 2 TIMES DAILY
Qty: 60 TABLET | Refills: 5 | Status: SHIPPED | OUTPATIENT
Start: 2025-02-18 | End: 2025-08-17

## 2025-02-18 NOTE — PROGRESS NOTES
Subjective:       Patient ID: Courtney Nino is a 69 y.o. female.    Chief Complaint: Hospital Follow Up    SUBJECTIVE:    Patient ID: Courtney Nino is a 69 y.o. female.    Chief Complaint: Hospital Follow Up    HPI    Admit Date: 2/7/25  Discharge Date: 2/11/25  Discharge Facility: Hospital      Family and/or Caretaker present at visit? Yes  Medication Reconciliation:  Medications changed/added/deleted.  Amiodarone  New Prescriptions filled after discharge: yes  Discharge summary reviewed:  yes  Diagnostic tests reviewed/disposition: No diagnosic tests pending after this hospitalization  Disease/illness education: done.  Follow up appointments scheduled:  yes              with Cardiology   Follow up labs/tests ordered:   no  Home Health ordered on discharge: Patient does not have home health established from hospital visit.  They do not need home health.  If needed, we will set up home health for the patient  Home Health company name:   Establishment or re-establishment of referral orders for community resources: No other necessary community resources  DME ordered at discharge:   no  How patient is feeling since discharge from the hospital?  better but slow     Discussion with other health care providers:  No outside discussion necessary.  Patient follow up phone call documented on separate encounter.    No results displayed because visit has over 200 results.      Patient Outreach on 02/03/2025   Component Date Value Ref Range Status    Left Eye DM Retinopathy 06/16/2023 Positive   Final    Right Eye DM Retinopathy 06/16/2023 Positive   Final       Past Medical History:   Diagnosis Date    Cigarette nicotine dependence     Hypertension     Obesity     Vitamin D deficiency      Past Surgical History:   Procedure Laterality Date    BREAST SURGERY      knot on right    HYSTERECTOMY      TONSILLECTOMY       Family History   Problem Relation Name Age of Onset    Heart disease Mother      Heart disease  "Father      COPD Father      Breast cancer Maternal Grandmother       Lab Results   Component Value Date    WBC 9.58 02/08/2025    HGB 13.4 02/08/2025    HCT 41.1 02/08/2025     02/08/2025    CHOL 223 (H) 03/24/2023    TRIG 180 (H) 03/24/2023    HDL 35 (L) 03/24/2023    ALT 30 02/10/2025    AST 19 02/10/2025     02/11/2025    K 4.0 02/11/2025     02/11/2025    CREATININE 0.8 02/11/2025    BUN 20 02/11/2025    CO2 26 02/11/2025    TSH 4.370 02/07/2025    INR 1.1 02/07/2025    HGBA1C 5.6 02/07/2025       Marital Status:   Alcohol History:  reports that she does not currently use alcohol.  Tobacco History:  reports that she has been smoking cigarettes. She has a 39 pack-year smoking history. She has never used smokeless tobacco.  Drug History:  reports that she does not currently use drugs.    Review of patient's allergies indicates:  No Known Allergies  Current Medications[1]    Review of Systems     Objective:      Vitals:    02/18/25 0915   BP: 115/73   Pulse: 86   Resp: 16   Temp: 97.8 °F (36.6 °C)   TempSrc: Oral   SpO2: 98%   Weight: 89.4 kg (197 lb 3.2 oz)   Height: 5' 4" (1.626 m)     Physical Exam    Assessment:       1. Atrial fibrillation with RVR    2. Congestive heart failure, unspecified HF chronicity, unspecified heart failure type    3. Chronic diastolic congestive heart failure    4. Vitamin D deficiency    5. Essential hypertension    6. Primary hypertension    7. Acute on chronic diastolic congestive heart failure    8. Cigarette smoker    9. Type 2 diabetes mellitus with both eyes affected by mild nonproliferative retinopathy without macular edema, without long-term current use of insulin    10. Trigger ring finger of left hand    11. Class 3 severe obesity with body mass index (BMI) of 40.0 to 44.9 in adult, unspecified obesity type, unspecified whether serious comorbidity present         Plan:       Atrial fibrillation with RVR  -     magnesium oxide (MAG-OX) 400 mg " (241.3 mg magnesium) tablet; Take 1 tablet (400 mg total) by mouth 2 (two) times daily.  Dispense: 60 tablet; Refill: 5  -     Ambulatory referral/consult to Cardiology; Future; Expected date: 02/25/2025    Congestive heart failure, unspecified HF chronicity, unspecified heart failure type  -     Ambulatory referral/consult to Cardiology; Future; Expected date: 02/25/2025    Chronic diastolic congestive heart failure    Vitamin D deficiency    Essential hypertension    Primary hypertension    Acute on chronic diastolic congestive heart failure    Cigarette smoker    Type 2 diabetes mellitus with both eyes affected by mild nonproliferative retinopathy without macular edema, without long-term current use of insulin  -     Microalbumin/Creatinine Ratio, Urine; Future; Expected date: 02/18/2025    Trigger ring finger of left hand  -     Ambulatory referral/consult to Orthopedics; Future; Expected date: 02/25/2025    Class 3 severe obesity with body mass index (BMI) of 40.0 to 44.9 in adult, unspecified obesity type, unspecified whether serious comorbidity present      Follow up in about 3 months (around 5/18/2025).              History of Present Illness    CHIEF COMPLAINT:  Ms. Nino presents for a hospital follow-up after being admitted with exacerbation of acute on chronic diastolic congestive heart failure complicated by chronic atrial fibrillation.    HPI:  Ms. Nino was discharged from the hospital 7 days ago on the 11th, after a 4-day admission from the 7th to the 11th. During her hospital stay, she had an exacerbation of acute on chronic diastolic congestive heart failure complicated by chronic atrial fibrillation. A cardioversion attempt was unsuccessful, unlike a previous attempt. She nearly required ventilator support and received BiPAP treatment. She was administered a Cortisem drip and Lasix during her stay.    She reports having chronic atrial fibrillation for an extended period. She received Lovenox  "injections in the hospital for blood clots, which served as a bridge to her current Eliquis medication.    She reports orthopedic issues, specifically soreness in her finger. The finger becomes stuck when bent and requires manual straightening. This issue has been ongoing, and she was previously informed it might be carpal tunnel syndrome potentially requiring surgery.    She is currently responsible for three young children, aged 1, 2, and 7 years, which adds significant responsibility to her daily life given her health conditions.    She denies any diarrhea from magnesium supplements and any specific needs for home health care.    MEDICATIONS:  Ms. Nino is on Eliquis for blood thinning (anticoagulation) and Amiodarone, newly added for atrial fibrillation. She is also taking Trulicity for diabetes, Lasix (furosemide) as a fluid pill for congestive heart failure, and potassium to counteract potassium loss from Lasix. Magox (magnesium oxide) has been discontinued.    MEDICAL HISTORY:  Ms. Nino has a history of chronic atrial fibrillation, chronic congestive heart failure with diastolic dysfunction, primary hypertension, diabetes, and vitamin D deficiency.    SURGICAL HISTORY:  Ms. Nino recently underwent a failed cardioversion for atrial fibrillation.    TEST RESULTS:  Ms. Nino's A1C was recently completed on the 7th and reported as "good". Her blood sugar was described as "great". A cardioversion was recently attempted but failed to restore normal heart rhythm. Ms. Nino used BiPAP during her recent hospital stay.    IMAGING:  An echocardiogram was performed. The results show good EF, chicken wing left atrial appendage, and diastolic dysfunction.    SOCIAL HISTORY:  Smoking: Current smoker, attempting to reduce consumption      ROS:  Respiratory: +shortness of breath  Cardiovascular: -chest pain, +palpitations  Gastrointestinal: -diarrhea  Musculoskeletal: +joint pain          Allergies and Medications: "   Review of patient's allergies indicates:  No Known Allergies  Current Medications[2]    Family History:   Family History   Problem Relation Name Age of Onset    Heart disease Mother      Heart disease Father      COPD Father      Breast cancer Maternal Grandmother         Social History:   Social History[3]        Objective:     Vitals:    02/18/25 0915   BP: 115/73   Pulse: 86   Resp: 16   Temp: 97.8 °F (36.6 °C)        Physical Exam    General: No acute distress. Well-developed. Well-nourished.  Eyes: EOMI. Sclerae anicteric.  HENT: Normocephalic. Atraumatic. Nares patent. Moist oral mucosa.  Cardiovascular: Regular rate. Regular rhythm. No murmurs. No rubs. No gallops. Normal S1, S2.  Respiratory: Normal respiratory effort. Clear to auscultation bilaterally. No rales. No rhonchi. No wheezing. Slightly diminished breath sounds at both bases.  Musculoskeletal: No  obvious deformity.  Extremities: No lower extremity edema. Mild edema.  Neurological: Alert & oriented x3. No slurred speech. Normal gait.  Psychiatric: Normal mood. Normal affect. Good insight. Good judgment.  Skin: Warm. Dry. No rash.            Assessment:       1. Atrial fibrillation with RVR    2. Congestive heart failure, unspecified HF chronicity, unspecified heart failure type    3. Chronic diastolic congestive heart failure    4. Vitamin D deficiency    5. Essential hypertension    6. Primary hypertension    7. Acute on chronic diastolic congestive heart failure    8. Cigarette smoker    9. Type 2 diabetes mellitus with both eyes affected by mild nonproliferative retinopathy without macular edema, without long-term current use of insulin    10. Trigger ring finger of left hand    11. Class 3 severe obesity with body mass index (BMI) of 40.0 to 44.9 in adult, unspecified obesity type, unspecified whether serious comorbidity present        Plan:       Assessment & Plan    IMPRESSION:  - Patient with chronic atrial fibrillation and diastolic  congestive heart failure  - Echocardiogram shows good EF but diastolic dysfunction  - Blood pressure and pulse stable on current medication regimen  - Left ring finger issue likely trigger finger, not carpal tunnel as previously suggested  - A1C within target range, indicating well-controlled diabetes    OBESITY, CLASS 3:  - Recommend wearing compression stockings or diabetic socks to manage edema.  - Continued Trulicity, a medication often used for diabetes management in obese patients.    CONGESTIVE HEART FAILURE WITH DIASTOLIC DYSFUNCTION:  - Ms. Nino reports feeling better since 4-day hospital admission for exacerbation of acute on chronic diastolic congestive heart failure 7 days ago.  - Physical exam revealed slightly diminished breath sounds at both lung bases and mild edema.  - Echocardiogram showed good EF (systolic function) but diastolic dysfunction.  - Hospital treatment included BiPAP, Cortisem drip, and Lasix.  - Continue potassium and magnesium supplements to counteract Lasix effects.  - Advised patient to watch salt intake closely and increase water consumption.  - Arranged urgent referral to cardiology for hospital follow-up before the end of the month.    CHRONIC ATRIAL FIBRILLATION:  - Monitored chronic atrial fibrillation, noting a recent failed cardioversion attempt during hospital stay.  - Physical exam revealed regular heart rhythm and rate.  - Continue Eliquis (blood thinner) and amiodarone (new medication) for management.  - Advised patient to take only the 360mg dose of the heart rate medication, not both doses together, to avoid excessive heart rate reduction.  - Noted previous use of Lovenox injections in the hospital as a bridge to Eliquis.  - Arranged urgent referral to cardiology for follow-up.    PRIMARY HYPERTENSION:  - Blood pressure and pulse within normal range today, with blood pressure on the low end of normal.  - Discontinued diltiazem 180 mg and continued diltiazem 360 mg  daily.  - Advised patient to continue current management for hypertension.    DIABETES:  - Recent A1C results from the 7th were satisfactory, confirming diabetes is under control.  - Scheduled diabetic foot exam for the next visit.    VITAMIN D DEFICIENCY:  - Initiated magnesium oxide (Magox) 400 mg daily for 30 days to address vitamin D deficiency.    TRIGGER FINGER:  - Evaluated patient's complaint of soreness in the left ring finger, which gets stuck and needs to be pulled straight.  - Diagnosed as trigger finger, not carpal tunnel as previously suggested.  - Explained the mechanism and its potential relation to smoking.  - Referred patient to orthopedics (Dr. Eileen Oliver) for evaluation of left ring finger trigger finger.    SMOKING CESSATION:  - Ms. Nino continues smoking at a reduced frequency.  - Discussed importance of complete cessation for overall health and to reduce secondhand smoke exposure to children in the home.  - Recommend using nicotine patches to manage cravings while quitting smoking, emphasizing complete cessation rather than cutting down.    FOLLOW UP:  - Follow up in 3 months.        Courtney was seen today for hospital follow up.    Diagnoses and all orders for this visit:    Atrial fibrillation with RVR  -     magnesium oxide (MAG-OX) 400 mg (241.3 mg magnesium) tablet; Take 1 tablet (400 mg total) by mouth 2 (two) times daily.  -     Ambulatory referral/consult to Cardiology; Future    Congestive heart failure, unspecified HF chronicity, unspecified heart failure type  -     Ambulatory referral/consult to Cardiology; Future    Chronic diastolic congestive heart failure    Vitamin D deficiency    Essential hypertension    Primary hypertension    Acute on chronic diastolic congestive heart failure    Cigarette smoker    Type 2 diabetes mellitus with both eyes affected by mild nonproliferative retinopathy without macular edema, without long-term current use of insulin  -      Microalbumin/Creatinine Ratio, Urine; Future    Trigger ring finger of left hand  -     Ambulatory referral/consult to Orthopedics; Future    Class 3 severe obesity with body mass index (BMI) of 40.0 to 44.9 in adult, unspecified obesity type, unspecified whether serious comorbidity present         Follow up in about 3 months (around 5/18/2025).  This note was generated with the assistance of ambient listening technology. Verbal consent was obtained by the patient and accompanying visitor(s) for the recording of patient appointment to facilitate this note. I attest to having reviewed and edited the generated note for accuracy, though some syntax or spelling errors may persist. Please contact the author of this note for any clarification.            [1]   Current Outpatient Medications:     aspirin (ECOTRIN) 81 MG EC tablet, Take 1 tablet (81 mg total) by mouth once daily., Disp: 90 tablet, Rfl: 1    blood-glucose meter kit, To check BG 2 times daily, to use with insurance preferred meter, Disp: 1 each, Rfl: 0    digoxin (LANOXIN) 125 mcg tablet, Take 1 tablet (0.125 mg total) by mouth once daily., Disp: 90 tablet, Rfl: 1    diltiaZEM (CARDIZEM CD) 180 MG 24 hr capsule, Take 1 capsule (180 mg total) by mouth once daily. Hold if heart rate < 60/min, Disp: 30 capsule, Rfl: 0    DROPSAFE ALCOHOL PREP PADS PadM, USE TOPICALLY AS NEEDED AS DIRECTED, Disp: 200 each, Rfl: 3    dulaglutide (TRULICITY) 3 mg/0.5 mL pen injector, Inject 3 mg into the skin every 7 days., Disp: 12 pen , Rfl: 3    metoprolol succinate (TOPROL-XL) 50 MG 24 hr tablet, Take 1 tablet (50 mg total) by mouth once daily., Disp: 90 tablet, Rfl: 3    omega-3 fatty acids/fish oil (FISH OIL-OMEGA-3 FATTY ACIDS) 300-1,000 mg capsule, Take 1 capsule by mouth once daily., Disp: , Rfl:     potassium chloride SA (K-DUR,KLOR-CON) 20 MEQ tablet, Take 20 mEq by mouth 2 (two) times daily., Disp: , Rfl:     pravastatin (PRAVACHOL) 40 MG tablet, TAKE 1 TABLET EVERY  DAY, Disp: 90 tablet, Rfl: 3    TRUE METRIX GLUCOSE TEST STRIP Strp, TEST BLOOD SUGAR TWICE DAILY, Disp: 200 strip, Rfl: 3    TRUEPLUS LANCETS 33 gauge Misc, TEST BLOOD SUGAR TWICE DAILY, Disp: 200 each, Rfl: 3    vitamin E 1000 UNIT capsule, Take 1,000 Units by mouth once daily., Disp: , Rfl:     amiodarone (PACERONE) 200 MG Tab, Take 1 tablet by mouth 3 times daily for 3 days and then Take 1 tablet by mouth twice daily for 21 days and then Take 1 tablet by mouth once daily., Disp: 57 tablet, Rfl: 0    apixaban (ELIQUIS) 5 mg Tab, Take 1 tablet (5 mg total) by mouth 2 (two) times daily., Disp: 180 tablet, Rfl: 3    furosemide (LASIX) 40 MG tablet, Take 1 tablet (40 mg total) by mouth once daily. (Patient not taking: Reported on 2/18/2025), Disp: 30 tablet, Rfl: 0    ibandronate (BONIVA) 150 mg tablet, Take 1 tablet (150 mg total) by mouth every 30 days. (Patient not taking: Reported on 2/18/2025), Disp: 1 tablet, Rfl: 11    lancets Misc, To check BG 2 times daily, to use with insurance preferred meter (Patient not taking: Reported on 2/18/2025), Disp: 200 each, Rfl: 1    magnesium oxide (MAG-OX) 400 mg (241.3 mg magnesium) tablet, Take 1 tablet (400 mg total) by mouth 2 (two) times daily., Disp: 60 tablet, Rfl: 5  [2]   Current Outpatient Medications   Medication Sig Dispense Refill    aspirin (ECOTRIN) 81 MG EC tablet Take 1 tablet (81 mg total) by mouth once daily. 90 tablet 1    blood-glucose meter kit To check BG 2 times daily, to use with insurance preferred meter 1 each 0    digoxin (LANOXIN) 125 mcg tablet Take 1 tablet (0.125 mg total) by mouth once daily. 90 tablet 1    diltiaZEM (CARDIZEM CD) 180 MG 24 hr capsule Take 1 capsule (180 mg total) by mouth once daily. Hold if heart rate < 60/min 30 capsule 0    DROPSAFE ALCOHOL PREP PADS PadM USE TOPICALLY AS NEEDED AS DIRECTED 200 each 3    dulaglutide (TRULICITY) 3 mg/0.5 mL pen injector Inject 3 mg into the skin every 7 days. 12 pen 3    metoprolol  succinate (TOPROL-XL) 50 MG 24 hr tablet Take 1 tablet (50 mg total) by mouth once daily. 90 tablet 3    omega-3 fatty acids/fish oil (FISH OIL-OMEGA-3 FATTY ACIDS) 300-1,000 mg capsule Take 1 capsule by mouth once daily.      potassium chloride SA (K-DUR,KLOR-CON) 20 MEQ tablet Take 20 mEq by mouth 2 (two) times daily.      pravastatin (PRAVACHOL) 40 MG tablet TAKE 1 TABLET EVERY DAY 90 tablet 3    TRUE METRIX GLUCOSE TEST STRIP Strp TEST BLOOD SUGAR TWICE DAILY 200 strip 3    TRUEPLUS LANCETS 33 gauge Misc TEST BLOOD SUGAR TWICE DAILY 200 each 3    vitamin E 1000 UNIT capsule Take 1,000 Units by mouth once daily.      amiodarone (PACERONE) 200 MG Tab Take 1 tablet by mouth 3 times daily for 3 days and then  Take 1 tablet by mouth twice daily for 21 days and then  Take 1 tablet by mouth once daily. 57 tablet 0    apixaban (ELIQUIS) 5 mg Tab Take 1 tablet (5 mg total) by mouth 2 (two) times daily. 180 tablet 3    furosemide (LASIX) 40 MG tablet Take 1 tablet (40 mg total) by mouth once daily. (Patient not taking: Reported on 2/18/2025) 30 tablet 0    ibandronate (BONIVA) 150 mg tablet Take 1 tablet (150 mg total) by mouth every 30 days. (Patient not taking: Reported on 2/18/2025) 1 tablet 11    lancets Misc To check BG 2 times daily, to use with insurance preferred meter (Patient not taking: Reported on 2/18/2025) 200 each 1    magnesium oxide (MAG-OX) 400 mg (241.3 mg magnesium) tablet Take 1 tablet (400 mg total) by mouth 2 (two) times daily. 60 tablet 5     No current facility-administered medications for this visit.   [3]   Social History  Socioeconomic History    Marital status:    Tobacco Use    Smoking status: Every Day     Current packs/day: 1.00     Average packs/day: 1 pack/day for 39.0 years (39.0 ttl pk-yrs)     Types: Cigarettes    Smokeless tobacco: Never   Substance and Sexual Activity    Alcohol use: Not Currently    Drug use: Not Currently     Social Drivers of Health     Financial Resource  Strain: High Risk (2/15/2025)    Overall Financial Resource Strain (CARDIA)     Difficulty of Paying Living Expenses: Hard   Food Insecurity: Food Insecurity Present (2/15/2025)    Hunger Vital Sign     Worried About Running Out of Food in the Last Year: Often true     Ran Out of Food in the Last Year: Often true   Transportation Needs: Unmet Transportation Needs (2/15/2025)    PRAPARE - Transportation     Lack of Transportation (Medical): No     Lack of Transportation (Non-Medical): Yes   Physical Activity: Inactive (2/15/2025)    Exercise Vital Sign     Days of Exercise per Week: 0 days     Minutes of Exercise per Session: 10 min   Stress: No Stress Concern Present (2/15/2025)    [employer] [place] [employer] - Occupational Stress Questionnaire     Feeling of Stress : Only a little   Recent Concern: Stress - Stress Concern Present (2/7/2025)    [employer] [place] [employer] - Occupational Stress Questionnaire     Feeling of Stress : Very much   Housing Stability: High Risk (2/15/2025)    Housing Stability Vital Sign     Unable to Pay for Housing in the Last Year: Yes     Number of Times Moved in the Last Year: 0     Homeless in the Last Year: No

## 2025-02-18 NOTE — PATIENT INSTRUCTIONS
We understand that controlling diabetes can feel overwhelming at times. We are here to offer the tools and support to help you manage your diabetes and meet your health goals. Please reference the meal planning guide below.     Diabetic Meal Planning    About this topic  Healthy eating is an important part of keeping your diabetes in control. A balanced diet along with your diabetic drugs will help you control your blood sugar. The right portions of healthy foods may help keep your sugar within your goal range. It may also help to lower or maintain your weight, manage cholesterol, the amount of fat in your blood, and blood pressure.      Image(s)    What will the results be?  Healthy eating may help you lower your blood sugar and keep it in a safe range. Keeping your blood sugar in a safe range may lower your chances for long term problems from your diabetes. You may be less likely to have damage to your kidneys, heart, eyes, or nerves.    What changes to diet are needed?  Healthy eating means:  Eating a range of foods from all food groups.  Eating the right size portions. Read the nutrition facts on each food you eat.  Eating meals and snacks at the same time each day. Do not skip a meal or snack. Skipping meals may cause your blood sugar to go too low, especially if you are on drugs for your diabetes. Skipping meals can also cause you to eat too much at the next meal.  Talk to your diabetes educator about making a personal meal plan for you. They can also help you eat the foods you like by modifying them to be healthier.    Who should use this diet?  This diet is helpful to people with high blood sugar or diabetes.    What foods are good to eat?  It is important to make a healthy meal. Eat a variety of different foods in the right portion.  Fill half of your plate with non-starchy vegetables. Non-starchy vegetables include: Broccoli, green beans, carrots, greens (etienne, kale, mustard, turnip), onions, tomatoes,  asparagus, beets, cauliflower, celery, and cucumber. Non-starchy vegetables are high in fiber and low in carbohydrates. These will help keep you full for longer without raising your blood sugar.  Fill 1/4 of your plate with carbohydrates. Try to choose whole grain options. Whole-grain products have more fiber which will make you feel full. Carbohydrates include: Bread, rice, pasta, and starchy vegetables. Starchy vegetables include beans, potatoes, acorn squash, butternut squash, corn, and peas.  Fill 1/4 of your plate with protein. Choose low-fat cuts of meat like boneless, skinless chicken breast; pork loin; 90% lean beef; lean and skinless turkey meat; and fresh fish (not fried) such as tuna, salmon, or mackerel.  Add a low-fat or non-fat dairy product like 8 ounces (240 mL) of 1% or skim milk or 6 ounces (180 mL) of low-fat yogurt. Eat the recommended serving. Milk and yogurt have carbohydrates which raise your blood sugar.  Add a small piece of fruit or 1/2 cup (120 grams) of frozen or canned fruit. Choose canned fruits in juice or water. Fruits include apples, bananas, peaches, pears, pineapple, plums, and oranges. Eat the recommended serving. Fruit has carbohydrates which can raise your blood sugar.  Include healthy fats in your meal like olive oil, canola oil, avocado, and nuts.  Other good foods to include in your diet are:  Foods high in fiber. Adding fiber to your meals may help control your blood sugar and cholesterol levels. It may also help with weight loss and prevent belly problems. If you are younger than 50, it is recommended to get 25 to 38 grams per day. If you are older than 50, it is recommended to get 21 to 30 grams per day. Good sources of fiber include:  Nuts and seeds  Beans, peas, and other legumes  Whole-grain products  Fruits  Vegetables  Smart snacks in the right portion. Do not go too long in between meals. Ask your dietitian when you should have a snack in between your meals. Snack on  things like:  Nuts  Vegetables and low-fat dressing  Light popcorn  Low-fat cheese and crackers  1/2 sandwich    What foods should be limited or avoided?  You may need to limit the amount of some foods you eat and how often you eat them. Foods that should be limited include:  High fat or processed foods like:  [place]  Sausage  Hot dogs  Whole-fat dairy products  Potato chips  Foods high in sodium like:  Canned soups  Soy sauce and some salad dressings  Cured meats  Salted snack foods like pretzels  Olives  Fats and oils like:  Margarine  Salad dressing  Gravy  Lard or shortening  Foods high in sugar like:  Candy  Cookies  Cake  Ice cream  Table sugar  Soda  Juice drinks  Starches that are not whole grain like:  White rice  French fries  White pasta  White bread  Sugary cereals  Baked goods, pastries, croissants  Beer, wine, and mixed drinks (alcohol). Drink alcohol in moderation (1 drink per day or less for adult women and 2 drinks per day or less for adult men). Drinking alcohol can cause fluctuations in your blood sugar and interfere with how your diabetic drugs work. Talk to your doctor about how you can safely include alcohol into your diet.    What can be done to prevent this health problem?  Some people have a higher chance of developing diabetes than others. This is a life-long problem. You can still lead a normal life. Diabetes can be managed through diet, exercise, and drugs. It is important to have support from your family and friends to help you with your goals.    When do I need to call the doctor?  Blood sugar level is above 240 mg/dL for more than a day  Blood sugar level drops to less than 40 and does not respond to 15 grams of simple carbohydrate, like 4 glucose tablets or 1/2 cup (120 mL) of fruit juice  Trouble breathing  Very sleepy and trouble concentrating  Feeling very thirsty  Needing to urinate (pee) more than normal  Throw up more than once or have many loose stools  You are so sick you  cannot eat or drink  Fever over 101°F (38°C)  Questions about your diet plan  You are not feeling better in 2 to 3 days or you are feeling worse    Helpful tips  Plan ahead. Plan your meals and grocery list before going to the store. This will help you to choose foods that are good for you.  Pack a lunch. Take healthy meals and snacks with you to work.  Keep a food journal to help keep you on track. Take note of foods that keep your blood sugar in goal range. Also note foods and meals that raise or lower your blood sugar. There are apps for your phone that can help with this.  Visit a restaurant's website before eating out. You can see the menu options and nutritional facts. This way, you can see which items best fit into your diet plan. Call ahead if you have questions.    Disclaimer.This generalized information is a limited summary of diagnosis, treatment, and/or medication information. It is not meant to be comprehensive and should be used as a tool to help the user understand and/or assess potential diagnostic and treatment options. It does NOT include all information about conditions, treatments, medications, side effects, or risks that may apply to a specific patient. It is not intended to be medical advice or a substitute for the medical advice, diagnosis, or treatment of a health care provider based on the health care provider's examination and assessment of a patient's specific and unique circumstances. Patients must speak with a health care provider for complete information about their health, medical questions, and treatment options, including any risks or benefits regarding use of medications. This information does not endorse any treatments or medications as safe, effective, or approved for treating a specific patient. UpTo[employer], Inc[employer] and its affiliates disclaim any warranty or liability relating to this information or the use thereof. The use of this information is governed by the Terms of Use,  available at Terms of Use. ©2022 [employer]ToDate, Inc. and its affiliates and/or licensors. All rights reserved. Avoid anything with sugar in the 1st 3 ingredients including honey and syrup.  Avoid dried fruits like raise nodes and apricots.  Other fruits and vegetables are okay but the sugar content is higher in bananas and grapes.  Avoid large portions on your starchy foods:  Bread rice potatoes and pasta.

## 2025-02-19 ENCOUNTER — TELEPHONE (OUTPATIENT)
Dept: CARDIOLOGY | Facility: CLINIC | Age: 70
End: 2025-02-19
Payer: MEDICARE

## 2025-02-24 ENCOUNTER — TELEPHONE (OUTPATIENT)
Dept: ADMINISTRATIVE | Facility: CLINIC | Age: 70
End: 2025-02-24
Payer: MEDICARE

## 2025-02-24 ENCOUNTER — PATIENT OUTREACH (OUTPATIENT)
Dept: ADMINISTRATIVE | Facility: OTHER | Age: 70
End: 2025-02-24
Payer: MEDICARE

## 2025-02-24 NOTE — PROGRESS NOTES
CHW - Initial Contact    This Community Health Worker completed OR updated the Social Determinant of Health questionnaire with patient via telephone today.    Pt identified barriers of most importance are:  Pt is requesting help with food. She states she lives with her daughter and her utility bill was paid by [employer] last month.     Referrals to community agencies completed with patient/caregiver consent outside of [facility] include: yes  Referrals were put through [facility] - no:   Support and Services: Food Pantry  Other information discussed the patient needs / wants help with: SDOH   Follow up required: yes  Follow-up Outreach - Due: 3/14/2025

## 2025-02-24 NOTE — PROGRESS NOTES
Pt is requesting help with food. She states she lives with her daughter and her utility bill was paid by [employer] last month. Sent feeding [place] CSFP guidelines in pt portal and pantry food locations. I will continue to follow on [facility] platform.

## 2025-02-25 DIAGNOSIS — I48.91 ATRIAL FIBRILLATION BY ELECTROCARDIOGRAM: ICD-10-CM

## 2025-02-25 RX ORDER — DIGOXIN 125 MCG
TABLET ORAL
Qty: 90 TABLET | Refills: 0 | OUTPATIENT
Start: 2025-02-25

## 2025-02-25 RX ORDER — DILTIAZEM HYDROCHLORIDE 180 MG/1
CAPSULE, COATED, EXTENDED RELEASE ORAL
Qty: 90 CAPSULE | Refills: 0 | OUTPATIENT
Start: 2025-02-25

## 2025-02-25 RX ORDER — FUROSEMIDE 40 MG/1
TABLET ORAL
Qty: 90 TABLET | Refills: 0 | OUTPATIENT
Start: 2025-02-25

## 2025-03-10 ENCOUNTER — OFFICE VISIT (OUTPATIENT)
Dept: ORTHOPEDICS | Facility: CLINIC | Age: 70
End: 2025-03-10
Payer: MEDICARE

## 2025-03-10 ENCOUNTER — HOSPITAL ENCOUNTER (OUTPATIENT)
Dept: RADIOLOGY | Facility: HOSPITAL | Age: 70
Discharge: HOME | End: 2025-03-10
Attending: PHYSICIAN ASSISTANT
Payer: MEDICARE

## 2025-03-10 VITALS — HEIGHT: 64 IN | BODY MASS INDEX: 33.64 KG/M2 | WEIGHT: 197.06 LBS

## 2025-03-10 DIAGNOSIS — M65.342 TRIGGER FINGER, LEFT RING FINGER: Primary | ICD-10-CM

## 2025-03-10 DIAGNOSIS — M25.812 IMPINGEMENT OF LEFT SHOULDER: ICD-10-CM

## 2025-03-10 PROCEDURE — 99999 PR PBB SHADOW E&M-EST. PATIENT-LVL IV: CPT | Mod: PBBFAC,HCNC,, | Performed by: PHYSICIAN ASSISTANT

## 2025-03-10 PROCEDURE — 73030 X-RAY EXAM OF SHOULDER: CPT | Mod: TC,HCNC,PN,LT

## 2025-03-10 PROCEDURE — 73030 X-RAY EXAM OF SHOULDER: CPT | Mod: 26,HCNC,LT, | Performed by: RADIOLOGY

## 2025-03-10 PROCEDURE — 73130 X-RAY EXAM OF HAND: CPT | Mod: TC,HCNC,PN,LT

## 2025-03-10 PROCEDURE — 73130 X-RAY EXAM OF HAND: CPT | Mod: 26,HCNC,LT, | Performed by: RADIOLOGY

## 2025-03-10 RX ORDER — TRIAMCINOLONE ACETONIDE 40 MG/ML
40 INJECTION, SUSPENSION INTRA-ARTICULAR; INTRAMUSCULAR
Status: DISCONTINUED | OUTPATIENT
Start: 2025-03-10 | End: 2025-03-10 | Stop reason: HOSPADM

## 2025-03-10 RX ADMIN — TRIAMCINOLONE ACETONIDE 40 MG: 40 INJECTION, SUSPENSION INTRA-ARTICULAR; INTRAMUSCULAR at 02:03

## 2025-03-10 NOTE — PROCEDURES
Large Joint Aspiration/Injection: L subacromial bursa    Date/Time: 3/10/2025 2:00 PM    Performed by: Romel Brice PA  Authorized by: Romel Brice PA    Consent Done?:  Yes (Verbal)  Indications:  Pain  Site marked: the procedure site was marked    Timeout: prior to procedure the correct patient, procedure, and site was verified    Prep: patient was prepped and draped in usual sterile fashion      Local anesthesia used?: Yes    Local anesthetic:  Lidocaine 1% without epinephrine    Details:  Needle Size:  25 G  Ultrasonic Guidance for needle placement?: No    Location:  Shoulder  Site:  L subacromial bursa  Medications:  40 mg triamcinolone acetonide 40 mg/mL  Patient tolerance:  Patient tolerated the procedure well with no immediate complications  Tendon Sheath    Date/Time: 3/10/2025 2:00 PM    Performed by: Romel Brice PA  Authorized by: Romel Brice PA    Consent Done?:  Yes (Verbal)  Indications:  Pain  Site marked: the procedure site was marked    Timeout: prior to procedure the correct patient, procedure, and site was verified    Prep: patient was prepped and draped in usual sterile fashion      Local anesthesia used?: Yes    Local anesthetic:  Lidocaine 1% without epinephrine  Location:  Ring finger  Site:  L ring flexor tendon sheath  Ultrasonic guidance for needle placement?: No    Needle size:  25 G  Medications:  40 mg triamcinolone acetonide 40 mg/mL  Patient tolerance:  Patient tolerated the procedure well with no immediate complications

## 2025-03-10 NOTE — PROGRESS NOTES
[facility]  [address]  [place] [address]  Phone: [phone]   [phone]    Patient's PCP: Jerrod Stubbs MD  Referring Provider: Dr. Jerrod Stubbs    Subjective:      Chief Complaint:   Chief Complaint   Patient presents with    Left Hand - Pain     Patient is here with complaints of  Left hand Trigger finger -  Ring finger  x 2 months.   Left Shoulder pain x 2 months, no known injury, ROM is painful & limited    Left Shoulder - Pain       Past Medical History:   Diagnosis Date    Cigarette nicotine dependence     Hypertension     Obesity     Vitamin D deficiency        Past Surgical History:   Procedure Laterality Date    BREAST SURGERY      knot on right    HYSTERECTOMY      TONSILLECTOMY         Current Outpatient Medications   Medication Sig    amiodarone (PACERONE) 200 MG Tab Take 1 tablet by mouth 3 times daily for 3 days and then  Take 1 tablet by mouth twice daily for 21 days and then  Take 1 tablet by mouth once daily.    apixaban (ELIQUIS) 5 mg Tab Take 1 tablet (5 mg total) by mouth 2 (two) times daily.    aspirin (ECOTRIN) 81 MG EC tablet Take 1 tablet (81 mg total) by mouth once daily.    blood-glucose meter kit To check BG 2 times daily, to use with insurance preferred meter    digoxin (LANOXIN) 125 mcg tablet Take 1 tablet (0.125 mg total) by mouth once daily.    diltiaZEM (CARDIZEM CD) 180 MG 24 hr capsule Take 1 capsule (180 mg total) by mouth once daily. Hold if heart rate < 60/min    DROPSAFE ALCOHOL PREP PADS PadM USE TOPICALLY AS NEEDED AS DIRECTED    dulaglutide (TRULICITY) 3 mg/0.5 mL pen injector Inject 3 mg into the skin every 7 days.    furosemide (LASIX) 40 MG tablet Take 1 tablet (40 mg total) by mouth once daily.    ibandronate (BONIVA) 150 mg tablet Take 1 tablet (150 mg total) by mouth every 30 days.    lancets Misc To check BG 2 times daily, to use with insurance preferred meter    magnesium oxide (MAG-OX) 400 mg (241.3 mg magnesium) tablet  Take 1 tablet (400 mg total) by mouth 2 (two) times daily.    metoprolol succinate (TOPROL-XL) 50 MG 24 hr tablet Take 1 tablet (50 mg total) by mouth once daily.    omega-3 fatty acids/fish oil (FISH OIL-OMEGA-3 FATTY ACIDS) 300-1,000 mg capsule Take 1 capsule by mouth once daily.    potassium chloride SA (K-DUR,KLOR-CON) 20 MEQ tablet Take 20 mEq by mouth 2 (two) times daily.    pravastatin (PRAVACHOL) 40 MG tablet TAKE 1 TABLET EVERY DAY    TRUE METRIX GLUCOSE TEST STRIP Strp TEST BLOOD SUGAR TWICE DAILY    TRUEPLUS LANCETS 33 gauge Misc TEST BLOOD SUGAR TWICE DAILY    vitamin E 1000 UNIT capsule Take 1,000 Units by mouth once daily.     No current facility-administered medications for this visit.       Review of patient's allergies indicates:  No Known Allergies    Family History   Problem Relation Name Age of Onset    Heart disease Mother      Heart disease Father      COPD Father      Breast cancer Maternal Grandmother         Social History[1]    Prior to meeting with the patient I reviewed the medical chart in [facility]. This included reviewing the previous progress notes from our office, review of the patient's last appointment with their primary care provider, review of any visits to the emergency room, and review of any pain management appointments or procedures.    History of present illness: 70 y.o. female,  presents to clinic today for evaluation of multiple musculoskeletal complaints.  She is complaining of pain in the left upper extremity with pain and decreased range motion in the left shoulder and then triggering of the left ring finger.  Both symptoms has been going on for a couple of months.  She denies any known injuries or trauma.    Previously treated for right shoulder impingement 2020       Review of Systems:    Constitutional: Negative for chills, fever and weight loss.   HENT: Negative for congestion.    Eyes: Negative for discharge and redness.   Respiratory: Negative for cough and shortness  of breath.    Cardiovascular: Negative for chest pain.   Gastrointestinal: Negative for nausea and vomiting.   Musculoskeletal: See HPI.   Skin: Negative for rash.   Neurological: Negative for headaches.   Endo/Heme/Allergies: Does not bruise/bleed easily.   Psychiatric/Behavioral: The patient is not nervous/anxious.    All other systems reviewed and are negative.       Objective:      Physical Examination:    Vital Signs:  There were no vitals filed for this visit.    Body mass index is 33.83 kg/m².    This a well-developed, well nourished patient in no acute distress.  They are alert and oriented and cooperative to examination.     Examination of the left upper extremity, the skin is dry and intact throughout the left upper extremity.  She has decreased range of motion in the left shoulder forward flexion 160°, external rotation 75°, internal rotation to the posterior left hip.  Pain with Neer's test pain with crossover testing.  Mild discomfort with Sammy's testing.  The left hand, she has degenerative changes at the IP joints with some deformities at the index finger D IP joint.  She has pain over the A1 pulley with range of motion of the ring finger.      Pertinent New Results:        XRAY Report / Interpretation:   AP lateral oblique views of the left hand taken today in the office do not demonstrate any acute osseous abnormalities.  No fractures or dislocations.  There is scattered degenerative changes primarily in the distal IP joint fingers.    AP and lateral views of the left shoulder taken today in the office do not demonstrate any significant osseous abnormalities.  Type 2 acromion process.    Procedure/s:  Large Joint Aspiration/Injection: L subacromial bursa    Date/Time: 3/10/2025 2:00 PM    Performed by: Romel Brice PA  Authorized by: Romel Brice PA    Consent Done?:  Yes (Verbal)  Indications:  Pain  Site marked: the procedure site was marked    Timeout: prior to procedure the  correct patient, procedure, and site was verified    Prep: patient was prepped and draped in usual sterile fashion      Local anesthesia used?: Yes    Local anesthetic:  Lidocaine 1% without epinephrine    Details:  Needle Size:  25 G  Ultrasonic Guidance for needle placement?: No    Location:  Shoulder  Site:  L subacromial bursa  Medications:  40 mg triamcinolone acetonide 40 mg/mL  Patient tolerance:  Patient tolerated the procedure well with no immediate complications  Tendon Sheath    Date/Time: 3/10/2025 2:00 PM    Performed by: Romel Brice PA  Authorized by: Romel Brice PA    Consent Done?:  Yes (Verbal)  Indications:  Pain  Site marked: the procedure site was marked    Timeout: prior to procedure the correct patient, procedure, and site was verified    Prep: patient was prepped and draped in usual sterile fashion      Local anesthesia used?: Yes    Local anesthetic:  Lidocaine 1% without epinephrine  Location:  Ring finger  Site:  L ring flexor tendon sheath  Ultrasonic guidance for needle placement?: No    Needle size:  25 G  Medications:  40 mg triamcinolone acetonide 40 mg/mL  Patient tolerance:  Patient tolerated the procedure well with no immediate complications           Assessment:       1. Trigger finger, left ring finger    2. Impingement of left shoulder      Plan:     Trigger finger, left ring finger  -     X-Ray Hand 3 view Left  -     Tendon Sheath    Impingement of left shoulder  -     X-Ray Shoulder 2 or More Views Left  -     Large Joint Aspiration/Injection: L subacromial bursa        Follow up in about 6 weeks (around 4/21/2025) for L ring trigger fing inj/L shoulder inj 3/10/25.    Trigger finger, we injected the ring finger A1 pulley.  Follow up in 6 weeks.  Left shoulder impingement/bursitis we injected the left shoulder as well.  Unfortunately she is raising 3 young children varying in age from 8 months to 10 years.  States she is unable to attend physical therapy.   She has been treated for right shoulder impingement years ago with bands.  She is going to do a home exercise program.  Follow up in 6 weeks.    The patient and I had a thorough discussion today.  We discussed the working diagnosis as well as several other potential alternative diagnoses.  We discussed treatment options, both conservative and surgical.  Conservative treatment options would include things such as activity modifications, workplace modifications, a period of rest, oral versus topical over the counter and oral versus topical prescription anti-inflammatory medications, physical therapy / occupational therapy, splinting / bracing, immobilization, corticosteroid injections, and others.        WELLINGTON Pizano, PAEileenC        EMR Statement:  Please note that portions of this patient encounter record were imported from the patients electronic medical record and that others were dictated using voice recognition software. For these reasons grammatical errors, nonsensical language, and apparently contradictory statements may be present.  These should be disregarded or interpreted with respect to the context of the document.       [1]   Social History  Socioeconomic History    Marital status:    Tobacco Use    Smoking status: Every Day     Current packs/day: 1.00     Average packs/day: 1 pack/day for 39.0 years (39.0 ttl pk-yrs)     Types: Cigarettes    Smokeless tobacco: Never   Substance and Sexual Activity    Alcohol use: Not Currently    Drug use: Not Currently     Social Drivers of Health     Financial Resource Strain: High Risk (2/24/2025)    Overall Financial Resource Strain (CARDIA)     Difficulty of Paying Living Expenses: Very hard   Food Insecurity: Food Insecurity Present (2/24/2025)    Hunger Vital Sign     Worried About Running Out of Food in the Last Year: Often true     Ran Out of Food in the Last Year: Often true   Transportation Needs: No Transportation Needs (2/24/2025)    PRAPARE -  Transportation     Lack of Transportation (Medical): No     Lack of Transportation (Non-Medical): No   Recent Concern: Transportation Needs - Unmet Transportation Needs (2/15/2025)    PRAPARE - Transportation     Lack of Transportation (Medical): No     Lack of Transportation (Non-Medical): Yes   Physical Activity: Inactive (2/24/2025)    Exercise Vital Sign     Days of Exercise per Week: 0 days     Minutes of Exercise per Session: 0 min   Stress: No Stress Concern Present (2/15/2025)    [employer] - Occupational Stress Questionnaire     Feeling of Stress : Only a little   Recent Concern: Stress - Stress Concern Present (2/7/2025)    [facility] - Occupational Stress Questionnaire     Feeling of Stress : Very much   Housing Stability: Low Risk  (2/24/2025)    Housing Stability Vital Sign     Unable to Pay for Housing in the Last Year: No     Number of Times Moved in the Last Year: 0     Homeless in the Last Year: No   Recent Concern: Housing Stability - High Risk (2/24/2025)    Housing Stability Vital Sign     Unable to Pay for Housing in the Last Year: Yes     Number of Times Moved in the Last Year: 0     Homeless in the Last Year: No

## 2025-03-12 ENCOUNTER — PATIENT OUTREACH (OUTPATIENT)
Dept: ADMINISTRATIVE | Facility: OTHER | Age: 70
End: 2025-03-12
Payer: MEDICARE

## 2025-03-24 ENCOUNTER — PATIENT MESSAGE (OUTPATIENT)
Dept: FAMILY MEDICINE | Facility: CLINIC | Age: 70
End: 2025-03-24
Payer: MEDICARE

## 2025-03-31 NOTE — PROGRESS NOTES
CHW - Case Closure    This Community Health Worker spoke to patient via telephone today.   Pt/Caregiver reported: Pt states she has not received Feeding [place] guidelines, but she neglected to tell me that she is not home at this time. I gave her the number over the phone [phone].   Pt/Caregiver denied any additional needs at this time and agrees with episode closure at this time.  Provided patient with Community Health Worker's contact information and encouraged him/her to contact this Community Health Worker if additional needs arise.

## 2025-04-01 ENCOUNTER — OFFICE VISIT (OUTPATIENT)
Dept: CARDIOLOGY | Facility: CLINIC | Age: 70
End: 2025-04-01
Payer: MEDICARE

## 2025-04-01 VITALS
BODY MASS INDEX: 32.99 KG/M2 | OXYGEN SATURATION: 98 % | WEIGHT: 192.19 LBS | DIASTOLIC BLOOD PRESSURE: 82 MMHG | SYSTOLIC BLOOD PRESSURE: 129 MMHG | HEART RATE: 74 BPM

## 2025-04-01 DIAGNOSIS — I48.19 PERSISTENT ATRIAL FIBRILLATION: ICD-10-CM

## 2025-04-01 DIAGNOSIS — I48.91 ATRIAL FIBRILLATION WITH RVR: ICD-10-CM

## 2025-04-01 DIAGNOSIS — I50.9 CONGESTIVE HEART FAILURE, UNSPECIFIED HF CHRONICITY, UNSPECIFIED HEART FAILURE TYPE: ICD-10-CM

## 2025-04-01 DIAGNOSIS — I48.0 PAROXYSMAL ATRIAL FIBRILLATION: Primary | ICD-10-CM

## 2025-04-01 DIAGNOSIS — I20.89 OTHER FORMS OF ANGINA PECTORIS: ICD-10-CM

## 2025-04-01 PROBLEM — I50.33 ACUTE ON CHRONIC DIASTOLIC CONGESTIVE HEART FAILURE: Status: RESOLVED | Noted: 2025-02-07 | Resolved: 2025-04-01

## 2025-04-01 PROBLEM — R07.9 CHEST PAIN: Status: ACTIVE | Noted: 2025-04-01

## 2025-04-01 LAB
OHS QRS DURATION: 92 MS
OHS QTC CALCULATION: 435 MS

## 2025-04-01 PROCEDURE — 3079F DIAST BP 80-89 MM HG: CPT | Mod: HCNC,CPTII,S$GLB, | Performed by: NURSE PRACTITIONER

## 2025-04-01 PROCEDURE — 1159F MED LIST DOCD IN RCRD: CPT | Mod: HCNC,CPTII,S$GLB, | Performed by: NURSE PRACTITIONER

## 2025-04-01 PROCEDURE — 1126F AMNT PAIN NOTED NONE PRSNT: CPT | Mod: HCNC,CPTII,S$GLB, | Performed by: NURSE PRACTITIONER

## 2025-04-01 PROCEDURE — 3288F FALL RISK ASSESSMENT DOCD: CPT | Mod: HCNC,CPTII,S$GLB, | Performed by: NURSE PRACTITIONER

## 2025-04-01 PROCEDURE — 99214 OFFICE O/P EST MOD 30 MIN: CPT | Mod: HCNC,S$GLB,, | Performed by: NURSE PRACTITIONER

## 2025-04-01 PROCEDURE — 3044F HG A1C LEVEL LT 7.0%: CPT | Mod: HCNC,CPTII,S$GLB, | Performed by: NURSE PRACTITIONER

## 2025-04-01 PROCEDURE — 4010F ACE/ARB THERAPY RXD/TAKEN: CPT | Mod: HCNC,CPTII,S$GLB, | Performed by: NURSE PRACTITIONER

## 2025-04-01 PROCEDURE — 3074F SYST BP LT 130 MM HG: CPT | Mod: HCNC,CPTII,S$GLB, | Performed by: NURSE PRACTITIONER

## 2025-04-01 PROCEDURE — 1101F PT FALLS ASSESS-DOCD LE1/YR: CPT | Mod: HCNC,CPTII,S$GLB, | Performed by: NURSE PRACTITIONER

## 2025-04-01 PROCEDURE — 3008F BODY MASS INDEX DOCD: CPT | Mod: HCNC,CPTII,S$GLB, | Performed by: NURSE PRACTITIONER

## 2025-04-01 PROCEDURE — 99999 PR PBB SHADOW E&M-EST. PATIENT-LVL IV: CPT | Mod: PBBFAC,HCNC,, | Performed by: NURSE PRACTITIONER

## 2025-04-01 NOTE — PROGRESS NOTES
Subjective:    Patient ID:  Courtney Nino is a 70 y.o. female who presents for follow-up   Chief Complaint   Patient presents with    Follow-up    Atrial Fibrillation    Hypertension       HPI:      Courtney Nino is here for follow-up visit. Some chest pain and mild SOB. Comes and goes. Time makes it better. Denies recent fever cough chills or congestion. Denies blood in the urine or blood in the stool.  Denies myalgias. Denies orthopnea or peripheral edema. Denies nausea vomiting or dyspepsia. No recent arm neck or jaw pain. No lightheadedness or dizziness.       Review of patient's allergies indicates:  No Known Allergies    Past Medical History:   Diagnosis Date    Cigarette nicotine dependence     Hypertension     Obesity     Vitamin D deficiency      Past Surgical History:   Procedure Laterality Date    BREAST SURGERY      knot on right    HYSTERECTOMY      TONSILLECTOMY       Social History[1]  Family History   Problem Relation Name Age of Onset    Heart disease Mother      Heart disease Father      COPD Father      Breast cancer Maternal Grandmother          Review of Systems:     PER HPI       Objective:        Vitals:    04/01/25 1315   BP: 129/82   Pulse: 74       Lab Results   Component Value Date    WBC 9.58 02/08/2025    HGB 13.4 02/08/2025    HCT 41.1 02/08/2025     02/08/2025    CHOL 223 (H) 03/24/2023    TRIG 180 (H) 03/24/2023    HDL 35 (L) 03/24/2023    ALT 30 02/10/2025    AST 19 02/10/2025     02/11/2025    K 4.0 02/11/2025     02/11/2025    CREATININE 0.8 02/11/2025    BUN 20 02/11/2025    CO2 26 02/11/2025    TSH 4.370 02/07/2025    INR 1.1 02/07/2025    HGBA1C 5.6 02/07/2025        ECHOCARDIOGRAM RESULTS  Results for orders placed during the hospital encounter of 02/07/25    Transesophageal echo (EAMON) with possible cardioversion    Interpretation Summary    Left Ventricle: The left ventricle is normal in size. Normal wall thickness. Normal wall motion. There  is normal systolic function with a visually estimated ejection fraction of 60 - 65%. There is normal diastolic function.    Right Ventricle: Normal right ventricular cavity size. Systolic function is normal.    Left Atrium: Left atrium is severely dilated. Agitated saline study of the atrial septum is negative after vasalva maneuver, suggesting absence of intracardiac shunt at the atrial level. No patent foramen ovale confirmed by agitated saline contrast. The left atrial appendage appears normal. The left atrial appendage has a chicken wing morphology. Appendage velocity is reduced at less than 40 cm/sec. There is no thrombus in the left atrial appendage.    Right Atrium: Right atrium is severely dilated.    Mitral Valve: There is moderate regurgitation with a centrally directed jet.    Tricuspid Valve: There is moderate to severe regurgitation with multiple jets.    Pulmonary Artery: There is moderate to severe pulmonary hypertension. The estimated pulmonary artery systolic pressure is 57 mmHg.    IVC/SVC: Intermediate venous pressure at 8 mmHg.    Successful electrical cardioversion synchronized shock of 120 joules        CURRENT/PREVIOUS VISIT EKG  Results for orders placed or performed in visit on 04/01/25   IN OFFICE EKG 12-LEAD (to [facility])    Collection Time: 04/01/25  1:59 PM   Result Value Ref Range    QRS Duration 92 ms    [facility] QTC Calculation 435 ms    Narrative    Test Reason : I48.19,    Vent. Rate :  74 BPM     Atrial Rate :    BPM     P-R Int :    ms          QRS Dur :  92 ms      QT Int : 392 ms       P-R-T Axes :      6  26 degrees    QTcB Int : 435 ms    Atrial fibrillation  Incomplete right bundle branch block  Abnormal ECG  When compared with ECG of 07-Feb-2025 05:21,  Vent. rate has decreased by 115 bpm  Incomplete right bundle branch block is now Present    Referred By: AJ MCALLISTER           Confirmed By:            Physical Exam:  CONSTITUTIONAL: No fever, no chills  HEENT: Normocephalic,  atraumatic,pupils reactive to light                 NECK:  No JVD no carotid bruit  CVS: Irregular rhythm rate controlled  LUNGS: Clear  ABDOMEN: Soft, NT, BS+  EXTREMITIES: No cyanosis, edema  : No nazario catheter  NEURO: AAO X 3  PSY: Normal affect      Medication List with Changes/Refills   Current Medications    APIXABAN (ELIQUIS) 5 MG TAB    Take 1 tablet (5 mg total) by mouth 2 (two) times daily.    ASPIRIN (ECOTRIN) 81 MG EC TABLET    Take 1 tablet (81 mg total) by mouth once daily.    BLOOD-GLUCOSE METER KIT    To check BG 2 times daily, to use with insurance preferred meter    DIGOXIN (LANOXIN) 125 MCG TABLET    Take 1 tablet (0.125 mg total) by mouth once daily.    DILTIAZEM (CARDIZEM CD) 180 MG 24 HR CAPSULE    Take 1 capsule (180 mg total) by mouth once daily. Hold if heart rate < 60/min    DROPSAFE ALCOHOL PREP PADS PADM    USE TOPICALLY AS NEEDED AS DIRECTED    DULAGLUTIDE (TRULICITY) 3 MG/0.5 ML PEN INJECTOR    Inject 3 mg into the skin every 7 days.    FUROSEMIDE (LASIX) 40 MG TABLET    Take 1 tablet (40 mg total) by mouth once daily.    IBANDRONATE (BONIVA) 150 MG TABLET    Take 1 tablet (150 mg total) by mouth every 30 days.    LANCETS MISC    To check BG 2 times daily, to use with insurance preferred meter    MAGNESIUM OXIDE (MAG-OX) 400 MG (241.3 MG MAGNESIUM) TABLET    Take 1 tablet (400 mg total) by mouth 2 (two) times daily.    METOPROLOL SUCCINATE (TOPROL-XL) 50 MG 24 HR TABLET    Take 1 tablet (50 mg total) by mouth once daily.    OMEGA-3 FATTY ACIDS/FISH OIL (FISH OIL-OMEGA-3 FATTY ACIDS) 300-1,000 MG CAPSULE    Take 1 capsule by mouth once daily.    POTASSIUM CHLORIDE SA (K-DUR,KLOR-CON) 20 MEQ TABLET    Take 20 mEq by mouth 2 (two) times daily.    PRAVASTATIN (PRAVACHOL) 40 MG TABLET    TAKE 1 TABLET EVERY DAY    TRUE METRIX GLUCOSE TEST STRIP STRP    TEST BLOOD SUGAR TWICE DAILY    TRUEPLUS LANCETS 33 GAUGE MISC    TEST BLOOD SUGAR TWICE DAILY    VITAMIN E 1000 UNIT CAPSULE    Take  1,000 Units by mouth once daily.   Discontinued Medications    AMIODARONE (PACERONE) 200 MG TAB    Take 1 tablet by mouth 3 times daily for 3 days and then  Take 1 tablet by mouth twice daily for 21 days and then  Take 1 tablet by mouth once daily.             Assessment:       1. Paroxysmal atrial fibrillation    2. Atrial fibrillation with RVR    3. Congestive heart failure, unspecified HF chronicity, unspecified heart failure type    4. Persistent atrial fibrillation    5. Other forms of angina pectoris         Plan:     Problem List Items Addressed This Visit       Atrial fibrillation with RVR    Current Assessment & Plan   Remains in AFIB  Rates are controlled  Continue Cardizem and Metoprolol at present dosing  DC amiodarone as patient is now persistently in AFIB         CHF (congestive heart failure)    Chest pain    Current Assessment & Plan   Samira Myoview  Continue ASA  Continue Cardizem CD  Continue Pravastatin          Other Visit Diagnoses         Paroxysmal atrial fibrillation    -  Primary      Persistent atrial fibrillation        Relevant Orders    IN OFFICE EKG 12-LEAD (to [facility])    Nuclear Stress - Cardiology Interpreted            No follow-ups on file.       Geovanna Morales, BLAKE-ACNP, CVNP-BC                 [1]   Social History  Tobacco Use    Smoking status: Every Day     Current packs/day: 1.00     Average packs/day: 1 pack/day for 39.0 years (39.0 ttl pk-yrs)     Types: Cigarettes    Smokeless tobacco: Never   Substance Use Topics    Alcohol use: Not Currently    Drug use: Not Currently

## 2025-04-01 NOTE — ASSESSMENT & PLAN NOTE
Remains in AFIB  Rates are controlled  Continue Cardizem and Metoprolol at present dosing  DC amiodarone as patient is now persistently in AFIB

## 2025-04-15 ENCOUNTER — TELEPHONE (OUTPATIENT)
Dept: CARDIOLOGY | Facility: HOSPITAL | Age: 70
End: 2025-04-15

## 2025-04-15 NOTE — TELEPHONE ENCOUNTER
Left message on voicemail.   Patient advised, test will be at [facility] ([address]  Will need to register on the first floor at the main entrance.  Patient advised that arrival time is 07:10.  Patient advised that they may be here about 3.5-4 hours, and may want to bring something to occupy their time, as there will be periods of waiting.    Patient advised, they may take their medications prior to testing if you need to. Advised if food is needed to take medications, please keep it light, like toast and juice.    Patient advised to avoid all caffeine 12 hours prior to testing.  This includes chocolate, tea and decaf coffee.    Will provide peanut butter crackers for a snack after stress test.  If patient would prefer something else, please bring a snack from home.    Wear comfortable clothing.  No lotions, oils, or powders to the upper chest area. May wear deodorant.    No metal jewelry, buttons, or zippers to the upper body.    [facility]

## 2025-04-16 ENCOUNTER — TELEPHONE (OUTPATIENT)
Dept: CARDIOLOGY | Facility: CLINIC | Age: 70
End: 2025-04-16
Payer: MEDICARE

## 2025-04-28 ENCOUNTER — TELEPHONE (OUTPATIENT)
Dept: CARDIOLOGY | Facility: HOSPITAL | Age: 70
End: 2025-04-28

## 2025-04-28 NOTE — TELEPHONE ENCOUNTER
Left message on voicemail.    Patient advised, test will be at [facility] ([address]  Will need to register on the first floor at the main entrance.  Patient advised that arrival time is 07:20.  Patient advised that they may be here about 3.5-4 hours, and may want to bring something to occupy their time, as there will be periods of waiting.    Patient advised, may take their medications prior to testing if you need to. Advised if medications are taken with food, please keep it light, like toast and juice.    Patient advised to avoid all caffeine 12 hours prior to testing.  This includes sodas, chocolate, decaf tea and coffee.    Will provide peanut butter crackers for a snack after stress test.  If patient would prefer something else, please bring a snack from home.    Wear comfortable clothing.  No lotions, oils, or powders to the upper chest area. May wear deodorant.    No metal jewelry, buttons, or zippers to the upper body.    Will send instructions via [employer] as well.

## 2025-04-29 ENCOUNTER — HOSPITAL ENCOUNTER (OUTPATIENT)
Dept: RADIOLOGY | Facility: HOSPITAL | Age: 70
Discharge: HOME | End: 2025-04-29
Attending: NURSE PRACTITIONER
Payer: MEDICARE

## 2025-04-29 ENCOUNTER — HOSPITAL ENCOUNTER (OUTPATIENT)
Dept: CARDIOLOGY | Facility: HOSPITAL | Age: 70
Discharge: HOME | End: 2025-04-29
Attending: NURSE PRACTITIONER
Payer: MEDICARE

## 2025-04-29 DIAGNOSIS — I48.19 PERSISTENT ATRIAL FIBRILLATION: ICD-10-CM

## 2025-04-29 LAB
CV PHARM DOSE: 0.4 MG
CV STRESS BASE HR: 79 BPM
DIASTOLIC BLOOD PRESSURE: 85 MMHG
EJECTION FRACTION- HIGH: 65 %
END DIASTOLIC INDEX-HIGH: 153 ML/M2
END DIASTOLIC INDEX-LOW: 93 ML/M2
END SYSTOLIC INDEX-HIGH: 71 ML/M2
END SYSTOLIC INDEX-LOW: 31 ML/M2
NUC REST DIASTOLIC VOLUME INDEX: 78
NUC REST EJECTION FRACTION: 59
NUC REST SYSTOLIC VOLUME INDEX: 32
NUC STRESS DIASTOLIC VOLUME INDEX: 86
NUC STRESS EJECTION FRACTION: 64 %
NUC STRESS SYSTOLIC VOLUME INDEX: 31
OHS CV CPX 1 MINUTE RECOVERY HEART RATE: 98 BPM
OHS CV CPX 85 PERCENT MAX PREDICTED HEART RATE MALE: 129
OHS CV CPX MAX PREDICTED HEART RATE: 152
OHS CV CPX PATIENT IS FEMALE: 1
OHS CV CPX PATIENT IS MALE: 0
OHS CV CPX PEAK DIASTOLIC BLOOD PRESSURE: 104 MMHG
OHS CV CPX PEAK HEAR RATE: 98 BPM
OHS CV CPX PEAK RATE PRESSURE PRODUCT: NORMAL
OHS CV CPX PEAK SYSTOLIC BLOOD PRESSURE: 188 MMHG
OHS CV CPX PERCENT MAX PREDICTED HEART RATE ACHIEVED: 67
OHS CV CPX RATE PRESSURE PRODUCT PRESENTING: NORMAL
OHS CV INITIAL DOSE: 12.3 MCG/KG/MIN
OHS CV PEAK DOSE: 26.7 MCG/KG/MIN
RETIRED EF AND QEF - SEE NOTES: 53 %
SYSTOLIC BLOOD PRESSURE: 153 MMHG

## 2025-04-29 PROCEDURE — A9502 TC99M TETROFOSMIN: HCPCS | Performed by: NURSE PRACTITIONER

## 2025-04-29 PROCEDURE — 93016 CV STRESS TEST SUPVJ ONLY: CPT | Mod: ,,, | Performed by: INTERNAL MEDICINE

## 2025-04-29 PROCEDURE — 93017 CV STRESS TEST TRACING ONLY: CPT

## 2025-04-29 PROCEDURE — 63600175 PHARM REV CODE 636 W HCPCS: Performed by: NURSE PRACTITIONER

## 2025-04-29 PROCEDURE — 78452 HT MUSCLE IMAGE SPECT MULT: CPT | Mod: 26,,, | Performed by: INTERNAL MEDICINE

## 2025-04-29 PROCEDURE — 93018 CV STRESS TEST I&R ONLY: CPT | Mod: ,,, | Performed by: INTERNAL MEDICINE

## 2025-04-29 RX ORDER — REGADENOSON 0.08 MG/ML
0.4 INJECTION, SOLUTION INTRAVENOUS ONCE
Status: COMPLETED | OUTPATIENT
Start: 2025-04-29 | End: 2025-04-29

## 2025-04-29 RX ADMIN — TETROFOSMIN 12.3 MILLICURIE: 1.38 INJECTION, POWDER, LYOPHILIZED, FOR SOLUTION INTRAVENOUS at 07:04

## 2025-04-29 RX ADMIN — REGADENOSON 0.4 MG: 0.08 INJECTION, SOLUTION INTRAVENOUS at 08:04

## 2025-04-29 RX ADMIN — TETROFOSMIN 26.7 MILLICURIE: 1.38 INJECTION, POWDER, LYOPHILIZED, FOR SOLUTION INTRAVENOUS at 08:04

## 2025-05-19 ENCOUNTER — OFFICE VISIT (OUTPATIENT)
Dept: FAMILY MEDICINE | Facility: CLINIC | Age: 70
End: 2025-05-19
Payer: MEDICARE

## 2025-05-19 VITALS
TEMPERATURE: 97 F | WEIGHT: 188.25 LBS | HEIGHT: 64 IN | SYSTOLIC BLOOD PRESSURE: 100 MMHG | BODY MASS INDEX: 32.14 KG/M2 | OXYGEN SATURATION: 99 % | DIASTOLIC BLOOD PRESSURE: 80 MMHG | HEART RATE: 53 BPM | RESPIRATION RATE: 18 BRPM

## 2025-05-19 DIAGNOSIS — E55.9 VITAMIN D DEFICIENCY: ICD-10-CM

## 2025-05-19 DIAGNOSIS — F17.210 CIGARETTE SMOKER: ICD-10-CM

## 2025-05-19 DIAGNOSIS — I10 ESSENTIAL HYPERTENSION: ICD-10-CM

## 2025-05-19 DIAGNOSIS — E11.3293 TYPE 2 DIABETES MELLITUS WITH BOTH EYES AFFECTED BY MILD NONPROLIFERATIVE RETINOPATHY WITHOUT MACULAR EDEMA, WITHOUT LONG-TERM CURRENT USE OF INSULIN: Primary | ICD-10-CM

## 2025-05-19 DIAGNOSIS — I48.91 ATRIAL FIBRILLATION WITH RVR: ICD-10-CM

## 2025-05-19 DIAGNOSIS — Z12.11 COLON CANCER SCREENING: ICD-10-CM

## 2025-05-19 PROCEDURE — 99999 PR PBB SHADOW E&M-EST. PATIENT-LVL IV: CPT | Mod: PBBFAC,HCNC,, | Performed by: FAMILY MEDICINE

## 2025-05-19 PROCEDURE — 1126F AMNT PAIN NOTED NONE PRSNT: CPT | Mod: CPTII,HCNC,S$GLB, | Performed by: FAMILY MEDICINE

## 2025-05-19 PROCEDURE — 3008F BODY MASS INDEX DOCD: CPT | Mod: CPTII,HCNC,S$GLB, | Performed by: FAMILY MEDICINE

## 2025-05-19 PROCEDURE — 99214 OFFICE O/P EST MOD 30 MIN: CPT | Mod: HCNC,S$GLB,, | Performed by: FAMILY MEDICINE

## 2025-05-19 PROCEDURE — 3074F SYST BP LT 130 MM HG: CPT | Mod: CPTII,HCNC,S$GLB, | Performed by: FAMILY MEDICINE

## 2025-05-19 PROCEDURE — 4010F ACE/ARB THERAPY RXD/TAKEN: CPT | Mod: CPTII,HCNC,S$GLB, | Performed by: FAMILY MEDICINE

## 2025-05-19 PROCEDURE — 1159F MED LIST DOCD IN RCRD: CPT | Mod: CPTII,HCNC,S$GLB, | Performed by: FAMILY MEDICINE

## 2025-05-19 PROCEDURE — 3288F FALL RISK ASSESSMENT DOCD: CPT | Mod: CPTII,HCNC,S$GLB, | Performed by: FAMILY MEDICINE

## 2025-05-19 PROCEDURE — 3079F DIAST BP 80-89 MM HG: CPT | Mod: CPTII,HCNC,S$GLB, | Performed by: FAMILY MEDICINE

## 2025-05-19 PROCEDURE — 1101F PT FALLS ASSESS-DOCD LE1/YR: CPT | Mod: CPTII,HCNC,S$GLB, | Performed by: FAMILY MEDICINE

## 2025-05-19 PROCEDURE — 3044F HG A1C LEVEL LT 7.0%: CPT | Mod: CPTII,HCNC,S$GLB, | Performed by: FAMILY MEDICINE

## 2025-05-19 RX ORDER — LOSARTAN POTASSIUM 50 MG/1
50 TABLET ORAL DAILY
COMMUNITY

## 2025-05-19 RX ORDER — METFORMIN HYDROCHLORIDE 500 MG/1
500 TABLET ORAL 2 TIMES DAILY WITH MEALS
COMMUNITY

## 2025-05-19 RX ORDER — GLIMEPIRIDE 2 MG/1
2 TABLET ORAL
COMMUNITY
End: 2025-05-19

## 2025-05-19 NOTE — PROGRESS NOTES
Subjective:       Patient ID: Courtney Nino is a 70 y.o. female.    Chief Complaint: Follow-up (3 month f/u)      History of Present Illness    CHIEF COMPLAINT:  Ms. Nino presents for a follow-up visit to discuss recent test results and manage chronic conditions including diabetes, hypertension, and atrial fibrillation.    HPI:  Ms. Nino recently underwent a stress test, which was abnormal. The test revealed a perfusion defect in the mid to apical anterior apical walls of the heart, suggesting a part of the heart may not be getting enough oxygen. No reversible ischemia or blockages were noted. Ms. Nino denies pain during the test.    Ms. Nino reports occasional chest pain, but does not take any additional medication for it beyond her regular medications. She is scheduled to be evaluated by Geovanna Urena, a nurse practitioner, on July 1st for follow-up on her cardiac issues.    Regarding diabetes management, patient had an episode of hypoglycemia this morning with a blood glucose level of 50, feeling very shaky during this episode. Ms. Nino states this does not occur frequently. She is currently taking Trulicity, a once-weekly injection, for diabetes management. Ms. Nino has lost 9 lbs over the past 3 months.    Ms. Nino reports some numbness in her feet or toes, as evidenced by the foot sensation test performed during the visit. She also has noticeable cyanosis in both feet, which the doctor attributes to impaired circulation.    Ms. Nino denies frequent episodes of hypoglycemia, coughing up blood, or any history of polyps.    MEDICATIONS:  Ms. Nino is on Eliquis and Digoxin for atrial fibrillation. She receives a weekly injection of Trulicity and takes Metformin for diabetes. She is also on Losartan for hypertension, Magnesium as a supplement, and baby aspirin for heart health. Ms. Nino discontinued Glipizide due to low blood sugar episodes.    MEDICAL HISTORY:  Ms. Nino has a history  of Vitamin D deficiency, diabetes, hypertension, atrial fibrillation (AFib), and COPD.    SURGICAL HISTORY:  Ms. Nino has undergone an angiogram, though the date is not specified.    TEST RESULTS:  Ms. Nino has a noted Vitamin D deficiency. Her A1C in February was 5.6. Ms. Nino recently underwent a stress test which was abnormal, showing no reversible ischemia or blockages, but a perfusion defect in the mid to apical anterior apical walls. She also had a recent echocardiogram revealing an EF of 65% and atrial fibrillation, though the Afib made assessment difficult.    SOCIAL HISTORY:  Smoking: Current smoker, used to smoke 2 packs a day      ROS:  Respiratory: -hemoptysis  Cardiovascular: +chest pain  Gastrointestinal: -bright red blood per rectum  Neurological: +decreased sensation in extremities          Allergies and Medications:   Review of patient's allergies indicates:  No Known Allergies  Current Medications[1]    Family History:   Family History   Problem Relation Name Age of Onset    Heart disease Mother      Heart disease Father      COPD Father      Breast cancer Maternal Grandmother         Social History:   Social History[2]        Objective:     Vitals:    05/19/25 1430   BP: 100/80   Pulse: (!) 53   Resp: 18   Temp: 97.4 °F (36.3 °C)     Lab Results   Component Value Date    WBC 9.58 02/08/2025    HGB 13.4 02/08/2025    HCT 41.1 02/08/2025     02/08/2025    CHOL 223 (H) 03/24/2023    TRIG 180 (H) 03/24/2023    HDL 35 (L) 03/24/2023    ALT 30 02/10/2025    AST 19 02/10/2025     02/11/2025    K 4.0 02/11/2025     02/11/2025    CREATININE 0.8 02/11/2025    BUN 20 02/11/2025    CO2 26 02/11/2025    TSH 4.370 02/07/2025    INR 1.1 02/07/2025    HGBA1C 5.6 02/07/2025     Wt Readings from Last 5 Encounters:   05/19/25 85.4 kg (188 lb 4.4 oz)   04/01/25 87.2 kg (192 lb 3.2 oz)   03/10/25 89.4 kg (197 lb 1.5 oz)   02/18/25 89.4 kg (197 lb 3.2 oz)   02/09/25 86.6 kg (190 lb 14.7 oz)      BP Readings from Last 3 Encounters:   05/19/25 100/80   04/01/25 129/82   02/18/25 115/73              Physical Exam    General: No acute distress. Well-developed. Well-nourished.  Eyes: EOMI. Sclerae anicteric.  HENT: Normocephalic. Atraumatic. Nares patent. Moist oral mucosa.  Cardiovascular: Irregularly irregular rate. Irregularly irregular rhythm. No murmurs. No rubs. No gallops. Normal S1, S2.  Respiratory: Normal respiratory effort. Clear to auscultation bilaterally. No rales. No rhonchi. No wheezing.  Musculoskeletal: No  obvious deformity.  Extremities: No lower extremity edema. Significant cyanosis in both lips and extremities. Diabetic changes to both pretibial areas.  Neurological: Alert & oriented x3. No slurred speech. Normal gait.  Psychiatric: Normal mood. Normal affect. Good insight. Good judgment.  Skin: Warm. Dry. No rash. Protective Sensation (w/ 10 gram monofilament):  Right: Intact  Left: Intact    Visual Inspection:  Erythema -  Bilateral and Dry Skin -  Bilateral difficult cyanosis remedies but good pulses and capillary refill.    Pedal Pulses:   Right: Present  Left: Present    Posterior Tibialis Pulses:   Right:Diminished  Left: Diminished             Assessment:       1. Type 2 diabetes mellitus with both eyes affected by mild nonproliferative retinopathy without macular edema, without long-term current use of insulin    2. Vitamin D deficiency    3. Essential hypertension    4. Cigarette smoker    5. Atrial fibrillation with RVR    6. Colon cancer screening        Plan:       Assessment & Plan    J44.9 Chronic obstructive pulmonary disease, unspecified  E11.42 Type 2 diabetes mellitus with diabetic polyneuropathy  E11.649 Type 2 diabetes mellitus with hypoglycemia without coma  E11.628 Type 2 diabetes mellitus with other skin complications  I48.20 Chronic atrial fibrillation, unspecified  I10 Essential (primary) hypertension  F17.210 Nicotine dependence, cigarettes,  uncomplicated  E55.9 Vitamin D deficiency, unspecified  R93.1 Abnormal findings on diagnostic imaging of heart and coronary circulation  R23.0 Cyanosis  R63.4 Abnormal weight loss  Z79.01 Long term (current) use of anticoagulants    CHRONIC OBSTRUCTIVE PULMONARY DISEASE, UNSPECIFIED:  - Monitored patient's COPD and noted lung scarring due to smoking history.  - Discussed smoking cessation and its importance for lung health.  - Recommend RSV and shingles vaccines for preventive care.  - Ordered annual CT Lungs for lung cancer screening.    TYPE 2 DIABETES MELLITUS:  - Conducted annual foot exam showing diabetic changes to both pretibial areas.  - Monitored the patient's hypoglycemic episode with blood sugar dropping to 50, causing shakiness.  - Evaluated blood sugar control, noting improvement with recent A1C of 5.6.  - Discontinued Glipizide and Glimepiride due to recent low glucose episode, improved glycemic control, lower blood pressure, and weight loss.  - Continued Trulicity (weekly injection) and Metformin for diabetes management.  - Ordered microalbumin and A1C tests.  - Instructed the patient to contact the office if experiencing any more low glucose episodes after discontinuing Glipizide.    ATRIAL FIBRILLATION:  - Monitored the patient's persistent atrial fibrillation, noting an irregularly irregular rhythm with a heart rate of approximately 100 to 110.  - Continued Eliquis for anticoagulation and Digoxin for heart rhythm control.    HYPERTENSION:  - Monitored blood pressure, noting it has been lower than previous visits.  - Continued Losartan for BP control.    NICOTINE DEPENDENCE:  - Monitored the patient's continued smoking despite health risks.  - Discussed smoking as a risk factor for CAD, diabetes complications, and potential link to lung cancer.  - Discussed behavioral techniques to avoid cigarette use.    VITAMIN D DEFICIENCY:  - Evaluated the patient's vitamin D deficiency.    ABNORMAL CARDIAC  IMAGING FINDINGS:  - Evaluated abnormal stress test showing perfusion defect in mid to apical anterior apical walls with no reversible ischemia or blockages.  - Considered angiogram to check for blockages.  - Reviewed results showing strong heart muscle with 65% EF.  - Educated on importance of seeking immediate medical attention for chest pain lasting more than 5 minutes, advising ER visit if pain does not resolve in that timeframe.    CYANOSIS:  - Monitored and noted significant cyanosis in lips and extremities during physical exam, indicating impaired circulation.    ABNORMAL WEIGHT LOSS:  - Monitored the patient's weight, noting a loss of 4 lbs since April 1st and 9 lbs since February 18th (9-pound weight loss over 3 months).  - Instructed the patient to continue current weight loss efforts.    ANTICOAGULANT USE:  - Continued low-dose aspirin.    FOLLOW-UP AND ADDITIONAL TESTS:  - Discussed Cologuard as alternative to colonoscopy for colon cancer screening and ordered the test.  - Ordered lipid panel and CMP.  - Follow up in 6 months if lab results are satisfactory.        Courtney was seen today for follow-up.    Diagnoses and all orders for this visit:    Type 2 diabetes mellitus with both eyes affected by mild nonproliferative retinopathy without macular edema, without long-term current use of insulin  -     Lipid Panel; Future  -     Hemoglobin A1C; Future  -     Comprehensive Metabolic Panel; Future  -     Microalbumin/Creatinine Ratio, Urine; Future    Vitamin D deficiency    Essential hypertension    Cigarette smoker  -     CT Chest Lung Screening Low Dose; Future    Atrial fibrillation with RVR    Colon cancer screening  -     Cologuard Screening (Multitarget Stool DNA); Future  -     Cologuard Screening (Multitarget Stool DNA)         Follow up in about 6 months (around 11/19/2025) for follow up HTN, follow up DM, follow up cholesterol.  This note was generated with the assistance of ambient listening  technology. Verbal consent was obtained by the patient and accompanying visitor(s) for the recording of patient appointment to facilitate this note. I attest to having reviewed and edited the generated note for accuracy, though some syntax or spelling errors may persist. Please contact the author of this note for any clarification.            [1]   Current Outpatient Medications   Medication Sig Dispense Refill    apixaban (ELIQUIS) 5 mg Tab Take 1 tablet (5 mg total) by mouth 2 (two) times daily. 180 tablet 3    aspirin (ECOTRIN) 81 MG EC tablet Take 1 tablet (81 mg total) by mouth once daily. 90 tablet 1    blood-glucose meter kit To check BG 2 times daily, to use with insurance preferred meter 1 each 0    digoxin (LANOXIN) 125 mcg tablet Take 1 tablet (0.125 mg total) by mouth once daily. 90 tablet 1    diltiaZEM (CARDIZEM CD) 180 MG 24 hr capsule Take 1 capsule (180 mg total) by mouth once daily. Hold if heart rate < 60/min 30 capsule 0    dulaglutide (TRULICITY) 3 mg/0.5 mL pen injector Inject 3 mg into the skin every 7 days. 12 pen 1    ibandronate (BONIVA) 150 mg tablet Take 1 tablet (150 mg total) by mouth every 30 days. 1 tablet 11    lancets Misc To check BG 2 times daily, to use with insurance preferred meter 200 each 1    losartan (COZAAR) 50 MG tablet Take 50 mg by mouth once daily.      magnesium oxide (MAG-OX) 400 mg (241.3 mg magnesium) tablet Take 1 tablet (400 mg total) by mouth 2 (two) times daily. 60 tablet 5    metFORMIN (GLUCOPHAGE) 500 MG tablet Take 500 mg by mouth 2 (two) times daily with meals.      metoprolol succinate (TOPROL-XL) 50 MG 24 hr tablet Take 1 tablet (50 mg total) by mouth once daily. 90 tablet 3    pravastatin (PRAVACHOL) 40 MG tablet TAKE 1 TABLET EVERY DAY 90 tablet 3    DROPSAFE ALCOHOL PREP PADS PadM USE TOPICALLY AS NEEDED AS DIRECTED (Patient not taking: Reported on 5/19/2025) 200 each 3    furosemide (LASIX) 40 MG tablet Take 1 tablet (40 mg total) by mouth once daily.  (Patient not taking: Reported on 5/19/2025) 30 tablet 0    omega-3 fatty acids/fish oil (FISH OIL-OMEGA-3 FATTY ACIDS) 300-1,000 mg capsule Take 1 capsule by mouth once daily. (Patient not taking: Reported on 5/19/2025)      potassium chloride SA (K-DUR,KLOR-CON) 20 MEQ tablet Take 20 mEq by mouth 2 (two) times daily. (Patient not taking: Reported on 5/19/2025)      TRUE METRIX GLUCOSE TEST STRIP Strp TEST BLOOD SUGAR TWICE DAILY (Patient not taking: Reported on 5/19/2025) 200 strip 3    TRUEPLUS LANCETS 33 gauge Misc TEST BLOOD SUGAR TWICE DAILY (Patient not taking: Reported on 5/19/2025) 200 each 3    vitamin E 1000 UNIT capsule Take 1,000 Units by mouth once daily. (Patient not taking: Reported on 5/19/2025)       No current facility-administered medications for this visit.   [2]   Social History  Socioeconomic History    Marital status:    Tobacco Use    Smoking status: Every Day     Current packs/day: 1.00     Average packs/day: 1 pack/day for 39.0 years (39.0 ttl pk-yrs)     Types: Cigarettes    Smokeless tobacco: Never   Substance and Sexual Activity    Alcohol use: Not Currently    Drug use: Not Currently     Social Drivers of Health     Financial Resource Strain: High Risk (2/24/2025)    Overall Financial Resource Strain (CARDIA)     Difficulty of Paying Living Expenses: Very hard   Food Insecurity: Food Insecurity Present (2/24/2025)    Hunger Vital Sign     Worried About Running Out of Food in the Last Year: Often true     Ran Out of Food in the Last Year: Often true   Transportation Needs: No Transportation Needs (2/24/2025)    PRAPARE - Transportation     Lack of Transportation (Medical): No     Lack of Transportation (Non-Medical): No   Recent Concern: Transportation Needs - Unmet Transportation Needs (2/15/2025)    PRAPARE - Transportation     Lack of Transportation (Medical): No     Lack of Transportation (Non-Medical): Yes   Physical Activity: Inactive (2/24/2025)    Exercise Vital Sign      Days of Exercise per Week: 0 days     Minutes of Exercise per Session: 0 min   Stress: No Stress Concern Present (2/15/2025)    [employer] - Occupational Stress Questionnaire     Feeling of Stress : Only a little   Recent Concern: Stress - Stress Concern Present (2/7/2025)    [employer] - Occupational Stress Questionnaire     Feeling of Stress : Very much   Housing Stability: Low Risk  (2/24/2025)    Housing Stability Vital Sign     Unable to Pay for Housing in the Last Year: No     Number of Times Moved in the Last Year: 0     Homeless in the Last Year: No   Recent Concern: Housing Stability - High Risk (2/24/2025)    Housing Stability Vital Sign     Unable to Pay for Housing in the Last Year: Yes     Number of Times Moved in the Last Year: 0     Homeless in the Last Year: No

## 2025-05-23 DIAGNOSIS — I48.19 PERSISTENT ATRIAL FIBRILLATION: ICD-10-CM

## 2025-05-23 DIAGNOSIS — I50.9 CONGESTIVE HEART FAILURE, UNSPECIFIED HF CHRONICITY, UNSPECIFIED HEART FAILURE TYPE: Primary | ICD-10-CM

## 2025-05-27 RX ORDER — DILTIAZEM HYDROCHLORIDE 360 MG/1
CAPSULE, EXTENDED RELEASE ORAL
Qty: 90 CAPSULE | Refills: 0 | OUTPATIENT
Start: 2025-05-27

## 2025-05-29 RX ORDER — DILTIAZEM HYDROCHLORIDE 180 MG/1
180 CAPSULE, COATED, EXTENDED RELEASE ORAL DAILY
Qty: 30 CAPSULE | Refills: 1 | Status: SHIPPED | OUTPATIENT
Start: 2025-05-29 | End: 2026-05-29

## 2025-05-29 NOTE — TELEPHONE ENCOUNTER
Copied from CRM #[id number]. Topic: Medications - Medication Authorization  >> May 28, 2025 11:22 AM Isaac wrote:  Type:  RX Refill Request    Who Called: pt   Refill or New Rx:refill  RX Name and Strength: Disp Refills Start End   diltiaZEM (CARDIZEM CD) 180 MG 24 hr capsule 30 capsule 0 2/12/2025 2/12/2026   Sig - Route: Take 1 capsule (180 mg total) by mouth once daily. Hold if heart rate < 60/min - Oral   Sent to pharmacy as: diltiaZEM (CARDIZEM CD) 180 MG 24 hr capsule   Notes to Pharmacy: .   E-Prescribing Status: Receipt confirmed by pharmacy (2/11/2025  2:22 PM CST)       How is the patient currently taking it? (ex. 1XDay):see above   Is this a 30 day or 90 day RX:see above   Preferred Pharmacy with phone number:    [facility] - [place], [place] - [address]  [address] [address]  Phone: [phone] Fax: [phone]    Local or Mail Order:mail   Ordering Provider:Geovanna Morales NP   Would the patient rather a call back or a response via [facility]? [email or web address]   Best Call Back Number: [phone]    Additional Information: Pharm call pt and stated she need Dr to authorize refill request

## 2025-05-30 ENCOUNTER — RESULTS FOLLOW-UP (OUTPATIENT)
Dept: FAMILY MEDICINE | Facility: CLINIC | Age: 70
End: 2025-05-30

## 2025-05-30 ENCOUNTER — HOSPITAL ENCOUNTER (OUTPATIENT)
Dept: RADIOLOGY | Facility: HOSPITAL | Age: 70
Discharge: HOME | End: 2025-05-30
Attending: FAMILY MEDICINE
Payer: MEDICARE

## 2025-05-30 DIAGNOSIS — K76.9 HEPATIC LESION: Primary | ICD-10-CM

## 2025-05-30 DIAGNOSIS — F17.210 CIGARETTE SMOKER: ICD-10-CM

## 2025-05-30 PROCEDURE — 71271 CT THORAX LUNG CANCER SCR C-: CPT | Mod: TC,PO

## 2025-05-30 PROCEDURE — 71271 CT THORAX LUNG CANCER SCR C-: CPT | Mod: 26,,, | Performed by: RADIOLOGY

## 2025-05-30 NOTE — PROGRESS NOTES
Is scan revealed a tiny nodule which is unchanged.  The lighter area in the liver is probably a venous sinus.  We will order an ultrasound to better assess.

## 2025-06-04 ENCOUNTER — TELEPHONE (OUTPATIENT)
Dept: FAMILY MEDICINE | Facility: CLINIC | Age: 70
End: 2025-06-04
Payer: MEDICARE

## 2025-06-09 ENCOUNTER — TELEPHONE (OUTPATIENT)
Dept: CARDIOLOGY | Facility: CLINIC | Age: 70
End: 2025-06-09
Payer: MEDICARE

## 2025-06-09 DIAGNOSIS — I48.91 ATRIAL FIBRILLATION WITH RVR: ICD-10-CM

## 2025-06-09 DIAGNOSIS — I50.9 CONGESTIVE HEART FAILURE, UNSPECIFIED HF CHRONICITY, UNSPECIFIED HEART FAILURE TYPE: Primary | ICD-10-CM

## 2025-06-09 RX ORDER — DILTIAZEM HYDROCHLORIDE 180 MG/1
180 CAPSULE, COATED, EXTENDED RELEASE ORAL DAILY
Qty: 30 CAPSULE | Refills: 6 | Status: SHIPPED | OUTPATIENT
Start: 2025-06-09 | End: 2026-06-09

## 2025-06-09 NOTE — TELEPHONE ENCOUNTER
Copied from CRM #[id number]. Topic: Medications - Medication Refill  >> Jun 9, 2025 11:39 AM Jonathan wrote:  Type:  RX Refill Request    Who Called: pt     Refill or New Rx:refill    RX Name and Strength:diltiaZEM (CARDIZEM CD) 180 MG 24 hr capsule 30 capsule 1 5/29/2025 5/29/2026   Sig - Route: Take 1 capsule (180 mg total) by mouth once daily. Hold if heart rate < 60/min - Oral   Sent to pharmacy as: diltiaZEM (CARDIZEM CD) 180 MG 24 hr capsule   Notes to Pharmacy: .   E-Prescribing Status: Receipt confirmed by pharmacy (5/29/2025  5:11 PM CDT)       How is the patient currently taking it? (ex. 1XDay):see above     Is this a 30 day or 90 day RX:see above  Preferred Pharmacy with phone number:    [facility] - [place], [place] - [address]  [address] [facility] [address] [address]  Phone: [phone] Fax: [phone]    Local or Mail Order:mail     Ordering Provider:ely Ibarra Call Back Number:[phone]    Additional Information: pt is out of meds.

## 2025-06-09 NOTE — TELEPHONE ENCOUNTER
Copied from CRM #[id number]. Topic: General Inquiry - Patient Advice  >> Jun 9, 2025 11:37 AM Jonathan wrote:  Type:  Needs Medical Advice    Who Called: pt     Best Call Back Number: [phone]    Additional Information: pt wants a callback from a nurse to discuss some concerns.  
LVM ASKING THE PT TO CALL OFFICE   TO ADDRESS CONCERNS OR QUESTIONS   
98

## 2025-06-10 ENCOUNTER — TELEPHONE (OUTPATIENT)
Dept: CARDIOLOGY | Facility: CLINIC | Age: 70
End: 2025-06-10
Payer: MEDICARE

## 2025-06-10 NOTE — TELEPHONE ENCOUNTER
Copied from CRM #[id number]. Topic: General Inquiry - Return Call  >> Jun 9, 2025  1:40 PM Jonathan wrote:  Type:  Patient Returning Call    Who Called:pt     Who Left Message for Patient:Reyes, Radha     Best Call Back Number:[phone]

## 2025-06-13 DIAGNOSIS — I48.91 ATRIAL FIBRILLATION WITH RVR: ICD-10-CM

## 2025-06-13 NOTE — TELEPHONE ENCOUNTER
Care Due:                  Date            Visit Type   Department     Provider  --------------------------------------------------------------------------------                                EP - [facility]      [address]  Last Visit: 05-      [facility] ([facility]   [facility]                              EP - [facility] [address]  Next Visit: 11-      [facility]  Refugiocarlos                                                            Last  Test          Frequency    Reason                     Performed    Due Date  --------------------------------------------------------------------------------    HBA1C.......  6 months...  dulaglutide..............  02- 08-    Lipid Panel.  12 months..  pravastatin..............  03- 03-    Phosphate...  12 months..  ibandronate..............  Not Found    Overdue    [employer] Embedded Care Due Messages. Reference number: [id number].   6/13/2025 1:52:47 PM CDT   [facility]   [address]  [address] [place] [address]  Phone: [phone]  Fax: [phone]  [address] [place] [address] Ne - Referral Requisition      _________________________________________________________________________________________   Name:  Prudence Cox     MRN:  [id number]  : 1942              Sex: F    Address:  [address] [place] [address] [address]: [phone]     [place] [id number]  Preferred*: [phone]       Order: REFERRAL TO CARDIOLOGY  Class: Outpatient Referral                Referral Priority: Urgent  Associated Dx: Syncope, unspecified syncope type (R55)     Comments:      If Coverage Data blank - Coverage Not Found.   Primary Coverage Secondary Coverage   Payor: [address] [place]  Plan: [facility] COMPLETE [[id number]]  ID: [id number]     Subscriber Information:  Name: Alivia Shirley  : 1942  Address: [address] [address] [address] [place]      Zip: [address]  Group No: [id number]  Plan: [id number] [[id number]]  ID: [id number]     Subscriber Information:  Name: Sybil Nassar  : 1942  Address: [address] [address] [address]: [facility]  [address]: [place]      Zip: [address]  Group [id number] [id number]      Referral associated with order: [id number]     Referred by: Benjie Rodriguez MD  Referral Reason: Specialty Services Required  Referred To:               Vaishnavi Schneider              [address]  [id number]  [place] [address]         Phone:  Fax:      [phone]      Visits Requested: 1  Order Date: Dec 7, 2020  Expiration Date:

## 2025-06-15 RX ORDER — LANOLIN ALCOHOL/MO/W.PET/CERES
400 CREAM (GRAM) TOPICAL 2 TIMES DAILY
Qty: 60 TABLET | Refills: 5 | Status: SHIPPED | OUTPATIENT
Start: 2025-06-15 | End: 2025-12-12

## 2025-07-01 ENCOUNTER — OFFICE VISIT (OUTPATIENT)
Dept: CARDIOLOGY | Facility: CLINIC | Age: 70
End: 2025-07-01
Payer: MEDICARE

## 2025-07-01 VITALS
DIASTOLIC BLOOD PRESSURE: 78 MMHG | SYSTOLIC BLOOD PRESSURE: 118 MMHG | HEART RATE: 64 BPM | BODY MASS INDEX: 32.37 KG/M2 | HEIGHT: 64 IN | WEIGHT: 189.63 LBS

## 2025-07-01 DIAGNOSIS — R60.0 LOCALIZED EDEMA: Primary | ICD-10-CM

## 2025-07-01 DIAGNOSIS — I50.9 CONGESTIVE HEART FAILURE, UNSPECIFIED HF CHRONICITY, UNSPECIFIED HEART FAILURE TYPE: ICD-10-CM

## 2025-07-01 DIAGNOSIS — I48.91 ATRIAL FIBRILLATION WITH RVR: ICD-10-CM

## 2025-07-01 DIAGNOSIS — I48.91 ATRIAL FIBRILLATION BY ELECTROCARDIOGRAM: ICD-10-CM

## 2025-07-01 DIAGNOSIS — E78.2 MIXED HYPERLIPIDEMIA: ICD-10-CM

## 2025-07-01 PROCEDURE — 99999 PR PBB SHADOW E&M-EST. PATIENT-LVL III: CPT | Mod: PBBFAC,HCNC,, | Performed by: NURSE PRACTITIONER

## 2025-07-01 PROCEDURE — 1126F AMNT PAIN NOTED NONE PRSNT: CPT | Mod: CPTII,HCNC,S$GLB, | Performed by: NURSE PRACTITIONER

## 2025-07-01 PROCEDURE — 3074F SYST BP LT 130 MM HG: CPT | Mod: CPTII,HCNC,S$GLB, | Performed by: NURSE PRACTITIONER

## 2025-07-01 PROCEDURE — 3078F DIAST BP <80 MM HG: CPT | Mod: CPTII,HCNC,S$GLB, | Performed by: NURSE PRACTITIONER

## 2025-07-01 PROCEDURE — 3044F HG A1C LEVEL LT 7.0%: CPT | Mod: CPTII,HCNC,S$GLB, | Performed by: NURSE PRACTITIONER

## 2025-07-01 PROCEDURE — 99214 OFFICE O/P EST MOD 30 MIN: CPT | Mod: HCNC,S$GLB,, | Performed by: NURSE PRACTITIONER

## 2025-07-01 PROCEDURE — 4010F ACE/ARB THERAPY RXD/TAKEN: CPT | Mod: CPTII,HCNC,S$GLB, | Performed by: NURSE PRACTITIONER

## 2025-07-01 PROCEDURE — 3008F BODY MASS INDEX DOCD: CPT | Mod: CPTII,HCNC,S$GLB, | Performed by: NURSE PRACTITIONER

## 2025-07-01 RX ORDER — DILTIAZEM HYDROCHLORIDE 180 MG/1
180 CAPSULE, COATED, EXTENDED RELEASE ORAL DAILY
Qty: 30 CAPSULE | Refills: 6 | Status: SHIPPED | OUTPATIENT
Start: 2025-07-01 | End: 2026-07-01

## 2025-07-01 RX ORDER — DIGOXIN 125 MCG
0.12 TABLET ORAL DAILY
Qty: 90 TABLET | Refills: 3 | Status: SHIPPED | OUTPATIENT
Start: 2025-07-01

## 2025-07-01 RX ORDER — LOSARTAN POTASSIUM 50 MG/1
50 TABLET ORAL DAILY
Qty: 90 TABLET | Refills: 3 | Status: SHIPPED | OUTPATIENT
Start: 2025-07-01

## 2025-07-01 RX ORDER — PRAVASTATIN SODIUM 40 MG/1
40 TABLET ORAL DAILY
Qty: 90 TABLET | Refills: 3 | Status: SHIPPED | OUTPATIENT
Start: 2025-07-01

## 2025-07-01 RX ORDER — METOPROLOL SUCCINATE 50 MG/1
50 TABLET, EXTENDED RELEASE ORAL DAILY
Qty: 90 TABLET | Refills: 3 | Status: SHIPPED | OUTPATIENT
Start: 2025-07-01

## 2025-07-01 RX ORDER — ASPIRIN 81 MG/1
81 TABLET ORAL DAILY
Qty: 90 TABLET | Refills: 3 | Status: SHIPPED | OUTPATIENT
Start: 2025-07-01

## 2025-07-01 NOTE — PROGRESS NOTES
Subjective:    Patient ID:  Courtney Nino is a 70 y.o. female who presents for follow-up of Results, Congestive Heart Failure, Atrial Fibrillation, Peripheral Arterial Disease, and Hypertension         History of Present Illness    Ms. Nino presents today for follow up She reports persistent dyspnea with a history of smoking 1 pack per day. She quit smoking 4 days ago and continues to experience ongoing respiratory symptoms. Recent stress test demonstrated no reversible ischemia with normal EF, ruling out cardiac etiology for her dyspnea. She reports swelling in bilateral legs. 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 She notes recent changes with one side getting larger recently. She denies pain associated with the mass. No prior diagnostic imaging has been performed to evaluate the mass. She reports ongoing difficulties obtaining [facility] with communication challenges. Family members are also experiencing issues with medication procurement. She expresses concern about medication confusion and wants clarity on current regimen. She was previously taking medication at 180 mg in May and currently reports taking 180 mg with no issues.    ROS:  General: no fever, no chills, no fatigue, no weight gain, no weight loss  Eyes: no vision changes, no redness, no discharge  ENT: no ear pain, no nasal congestion, no sore throat  Cardiovascular: no chest pain, no palpitations, positive lower extremity edema  Respiratory: no cough, positive shortness of breath  Gastrointestinal: no abdominal pain, no nausea, no vomiting, no diarrhea, no constipation, no blood in stool  Genitourinary: no dysuria, no hematuria, no frequency  Musculoskeletal: no joint pain, no muscle pain  Skin: no rash, positive lesion, positive lumps/masses  Neurological: no headache, no dizziness, no numbness, no tingling  Psychiatric: positive anxiety, no depression, no sleep difficulty, positive nervousness          Review of patient's allergies indicates:  No Known Allergies  Past Medical History:   Diagnosis Date    Cigarette nicotine dependence     Hypertension     Obesity     Vitamin D deficiency      Past Surgical History:   Procedure Laterality Date    BREAST SURGERY      knot on right    HYSTERECTOMY      TONSILLECTOMY       Social History[1]  Family History   Problem Relation Name Age of Onset    Heart disease Mother      Heart disease Father      COPD Father      Breast cancer Maternal Grandmother                     Objective:        Vitals:    07/01/25 1455   BP: 118/78   Pulse: 64       Lab Results   Component Value Date    WBC 9.58 02/08/2025    HGB 13.4 02/08/2025    HCT 41.1 02/08/2025     02/08/2025    CHOL 223 (H) 03/24/2023    TRIG 180 (H) 03/24/2023    HDL 35 (L) 03/24/2023    ALT 30 02/10/2025    AST 19 02/10/2025     02/11/2025    K 4.0 02/11/2025     02/11/2025    CREATININE 0.8 02/11/2025    BUN 20 02/11/2025    CO2 26 02/11/2025    TSH 4.370 02/07/2025    INR 1.1 02/07/2025    HGBA1C 5.6 02/07/2025        ECHOCARDIOGRAM RESULTS  Results for orders placed during the hospital encounter of 02/07/25    Transesophageal echo (EAMON) with possible cardioversion    Interpretation Summary    Left Ventricle: The left ventricle is normal in size. Normal wall thickness. Normal wall motion. There is normal systolic function with a visually estimated ejection fraction of 60 - 65%. There is normal diastolic function.    Right Ventricle: Normal right ventricular cavity size. Systolic function is normal.    Left Atrium: Left atrium is severely dilated. Agitated saline study of the atrial septum is negative after vasalva maneuver, suggesting absence of intracardiac shunt at the atrial level. No patent foramen ovale confirmed by agitated saline contrast. The left atrial appendage appears normal. The left atrial appendage has a chicken wing morphology. Appendage velocity is reduced at less than 40  cm/sec. There is no thrombus in the left atrial appendage.    Right Atrium: Right atrium is severely dilated.    Mitral Valve: There is moderate regurgitation with a centrally directed jet.    Tricuspid Valve: There is moderate to severe regurgitation with multiple jets.    Pulmonary Artery: There is moderate to severe pulmonary hypertension. The estimated pulmonary artery systolic pressure is 57 mmHg.    IVC/SVC: Intermediate venous pressure at 8 mmHg.    Successful electrical cardioversion synchronized shock of 120 joules        CURRENT/PREVIOUS VISIT EKG  Results for orders placed or performed in visit on 04/01/25   IN OFFICE EKG 12-LEAD (to [facility])    Collection Time: 04/01/25  1:59 PM   Result Value Ref Range    QRS Duration 92 ms    [facility] QTC Calculation 435 ms    Narrative    Test Reason : I48.19,    Vent. Rate :  74 BPM     Atrial Rate :    BPM     P-R Int :    ms          QRS Dur :  92 ms      QT Int : 392 ms       P-R-T Axes :      6  26 degrees    QTcB Int : 435 ms    Atrial fibrillation  Incomplete right bundle branch block  Abnormal ECG  When compared with ECG of 07-Feb-2025 05:21,  Vent. rate has decreased by 115 bpm  Incomplete right bundle branch block is now Present  Confirmed by Saul Cabrera ([id number]) on 4/30/2025 8:16:46 PM    Referred By: AJ MCALLISTER           Confirmed By: Saul Cabrera     No valid procedures specified.   Results for orders placed during the hospital encounter of 04/29/25    Nuclear Stress - Cardiology Interpreted    Interpretation Summary    Abnormal myocardial perfusion scan.  This has no evidence of any reversible ischemia seen    There is a moderate intensity, medium sized, equivocal perfusion abnormality that is fixed in the mid to apical anteroapical wall(s). This finding is equivocal due to a breast shadow overlying the myocardium.    There are no other significant perfusion abnormalities.    There is a mild to moderate intensity perfusion abnormality in the anteroapical  wall of the left ventricle, secondary to breast attenuation.    The gated perfusion images showed an ejection fraction of 59% at rest. The gated perfusion images showed an ejection fraction of 64% post stress. Normal ejection fraction is greater than 53%.    There is normal wall motion at rest and post-stress.    LV cavity size is normal at rest and normal at post-stress.    The ECG portion of the study is negative for ischemia.    The patient reported no chest pain during the stress test.    During stress, occasional PVCs are noted.      Physical Exam    Vitals: Blood pressure: 2018 over 70.  General: No acute distress. Well-developed. Well-nourished.  Eyes: EOMI. Sclerae anicteric.  HENT: Normocephalic. Atraumatic. Nares patent. Moist oral mucosa.  Ears: Bilateral TMs clear. Bilateral EACs clear.  Cardiovascular: Regular rate. Regular rhythm. No murmurs. No rubs. No gallops. Normal S1, S2.  Respiratory: Normal respiratory effort. Clear to auscultation bilaterally. No rales. No rhonchi. No wheezing.  Abdomen: Soft. Non-tender. Non-distended. Normoactive bowel sounds.  Musculoskeletal: No  obvious deformity.  Extremities: No lower extremity edema. Swelling in the legs.  Neurological: Alert & oriented x3. No slurred speech. Normal gait.  Psychiatric: Normal mood. Normal affect. Good insight. Good judgment.  Skin: Warm. Dry. No rash.        Medication List with Changes/Refills   Current Medications    BLOOD-GLUCOSE METER KIT    To check BG 2 times daily, to use with insurance preferred meter    DROPSAFE ALCOHOL PREP PADS PADM    USE TOPICALLY AS NEEDED AS DIRECTED    DULAGLUTIDE (TRULICITY) 3 MG/0.5 ML PEN INJECTOR    Inject 3 mg into the skin every 7 days.    FUROSEMIDE (LASIX) 40 MG TABLET    Take 1 tablet (40 mg total) by mouth once daily.    IBANDRONATE (BONIVA) 150 MG TABLET    Take 1 tablet (150 mg total) by mouth every 30 days.    LANCETS MISC    To check BG 2 times daily, to use with insurance preferred meter     MAGNESIUM OXIDE (MAG-OX) 400 MG (241.3 MG MAGNESIUM) TABLET    Take 1 tablet (400 mg total) by mouth 2 (two) times daily.    METFORMIN (GLUCOPHAGE) 500 MG TABLET    Take 500 mg by mouth 2 (two) times daily with meals.    OMEGA-3 FATTY ACIDS/FISH OIL (FISH OIL-OMEGA-3 FATTY ACIDS) 300-1,000 MG CAPSULE    Take 1 capsule by mouth once daily.    POTASSIUM CHLORIDE SA (K-DUR,KLOR-CON) 20 MEQ TABLET    Take 20 mEq by mouth 2 (two) times daily.    TRUE METRIX GLUCOSE TEST STRIP STRP    TEST BLOOD SUGAR TWICE DAILY    TRUEPLUS LANCETS 33 GAUGE MISC    TEST BLOOD SUGAR TWICE DAILY    VITAMIN E 1000 UNIT CAPSULE    Take 1,000 Units by mouth once daily.   Changed and/or Refilled Medications    Modified Medication Previous Medication    APIXABAN (ELIQUIS) 5 MG TAB apixaban (ELIQUIS) 5 mg Tab       Take 1 tablet (5 mg total) by mouth 2 (two) times daily.    Take 1 tablet (5 mg total) by mouth 2 (two) times daily.    ASPIRIN (ECOTRIN) 81 MG EC TABLET aspirin (ECOTRIN) 81 MG EC tablet       Take 1 tablet (81 mg total) by mouth once daily.    Take 1 tablet (81 mg total) by mouth once daily.    DIGOXIN (LANOXIN) 125 MCG TABLET digoxin (LANOXIN) 125 mcg tablet       Take 1 tablet (0.125 mg total) by mouth once daily.    Take 1 tablet (0.125 mg total) by mouth once daily.    DILTIAZEM (CARDIZEM CD) 180 MG 24 HR CAPSULE diltiaZEM (CARDIZEM CD) 180 MG 24 hr capsule       Take 1 capsule (180 mg total) by mouth once daily. Hold if heart rate < 60/min    Take 1 capsule (180 mg total) by mouth once daily. Hold if heart rate < 60/min    LOSARTAN (COZAAR) 50 MG TABLET losartan (COZAAR) 50 MG tablet       Take 1 tablet (50 mg total) by mouth once daily.    Take 50 mg by mouth once daily.    METOPROLOL SUCCINATE (TOPROL-XL) 50 MG 24 HR TABLET metoprolol succinate (TOPROL-XL) 50 MG 24 hr tablet       Take 1 tablet (50 mg total) by mouth once daily.    Take 1 tablet (50 mg total) by mouth once daily.    PRAVASTATIN (PRAVACHOL) 40 MG TABLET  pravastatin (PRAVACHOL) 40 MG tablet       Take 1 tablet (40 mg total) by mouth once daily.    TAKE 1 TABLET EVERY DAY         Assessment & Plan:       1. Localized edema    2. Atrial fibrillation by electrocardiogram    3. Congestive heart failure, unspecified HF chronicity, unspecified heart failure type    4. Atrial fibrillation with RVR    5. Mixed hyperlipidemia      Assessment & Plan    I50.9 Congestive heart failure, unspecified HF chronicity, unspecified heart failure type  I48.91 Atrial fibrillation by electrocardiogram  E78.2 Mixed hyperlipidemia  R60.0 Localized edema    PLAN SUMMARY:  1. Continue efforts to quit smoking  2. Refilled all patient's medications  3. Ordered soft tissue scan to evaluate mass (possibly lipoma or fatty tumor)    CONGESTIVE HEART FAILURE, UNSPECIFIED HF CHRONICITY, UNSPECIFIED HEART FAILURE TYPE:  1. Shortness of breath likely due to lungs and smoking, not heart-related.    ATRIAL FIBRILLATION BY ELECTROCARDIOGRAM:  Continue Eliquis, Metoprolol and Cardizem    LOCALIZED EDEMA:  1. Mass noted on body, possibly a lipoma or fatty tumor.  2. Ordered soft tissue scan to further evaluate the mass.  3. Additional plan items not directly linked to the provided ICD-10 codes: - Ms. Nino to continue efforts to quit smoking. - Refilled all patient's medicines.           Hypertension Medications              diltiaZEM (CARDIZEM CD) 180 MG 24 hr capsule Take 1 capsule (180 mg total) by mouth once daily. Hold if heart rate < 60/min    furosemide (LASIX) 40 MG tablet Take 1 tablet (40 mg total) by mouth once daily.    losartan (COZAAR) 50 MG tablet Take 1 tablet (50 mg total) by mouth once daily.    metoprolol succinate (TOPROL-XL) 50 MG 24 hr tablet Take 1 tablet (50 mg total) by mouth once daily.           Hyperlipidemia Medications              omega-3 fatty acids/fish oil (FISH OIL-OMEGA-3 FATTY ACIDS) 300-1,000 mg capsule Take 1 capsule by mouth once daily.    pravastatin (PRAVACHOL) 40  MG tablet Take 1 tablet (40 mg total) by mouth once daily.             No follow-ups on file.    This note was generated with the assistance of ambient listening technology. Verbal consent was obtained by the patient and accompanying visitor(s) for the recording of patient appointment to facilitate this note. I attest to having reviewed and edited the generated note for accuracy, though some syntax or spelling errors may persist. Please contact the author of this note for any clarification.         Geovanna Morales, AG-ACNP-BC,CVNP-BC            [1]   Social History  Tobacco Use    Smoking status: Every Day     Current packs/day: 1.00     Average packs/day: 1 pack/day for 39.0 years (39.0 ttl pk-yrs)     Types: Cigarettes    Smokeless tobacco: Never   Substance Use Topics    Alcohol use: Not Currently    Drug use: Not Currently

## 2025-07-16 ENCOUNTER — PATIENT MESSAGE (OUTPATIENT)
Dept: ADMINISTRATIVE | Facility: HOSPITAL | Age: 70
End: 2025-07-16
Payer: MEDICARE

## 2025-08-04 ENCOUNTER — PATIENT OUTREACH (OUTPATIENT)
Dept: ADMINISTRATIVE | Facility: HOSPITAL | Age: 70
End: 2025-08-04
Payer: MEDICARE

## 2025-08-04 NOTE — PROGRESS NOTES
Care Coordination Encounter Details:       MyChart Portal Status:         []  Reviewed MyChart Portal Status offered / enrolled if applicable        Additional Notes:     MyChart Outcomes: Pt is enrolled & active          Updates Requested / Reviewed:        Updated Care Coordination Note, Care Everywhere, , External Sources: [facility], [facility], and [facility], Removed  or Duplicate Orders, and Immunizations Reconciliation Completed or Queried: [place]         Health Maintenance Screening(s) Due:      Health Maintenance Topics Overdue:      VBHM Score: 4     Colon Cancer Screening  Urine Screening  Eye Exam  Lipid Panel    Shingles/Zoster Vaccine  RSV Vaccine                  Health Maintenance Topic(s) Outreach Outcomes & Actions Taken:    Colorectal Cancer Screening - Outreach Outcomes & Actions Taken  : Reminder pt portal message sent.    Eye Exam - Outreach Outcomes & Actions Taken  : Reminder pt portal message sent.    Lab(s) - Outreach Outcomes & Actions Taken  : Reminder pt portal message sent.           Additional Notes:      Third call placed regaridng overdue health maintenance, left message.       Chronic Disease Management:     Diabetes Measures        Lab Results   Component Value Date    HGBA1C 5.6 2025           [x]  Reviewed chart for active Diabetes diagnosis     []  Scheduled necessary follow up appointments if needed         Additional Notes:             Hypertension Measures        BP Readings from Last 1 Encounters:   25 118/78           [x]  Reviewed chart for active Hypertension diagnosis     []  Reviewed & documented Home BP Cuff     []  Documented a Remote BP if needed & applicable     []  Scheduled necessary follow up appointments with Primary Care if needed         Additional Notes:             Provider Team Continuity:     Last PCP Visit Date: 2025          [x]  Reviewed Primary Care Provider Visits, Annual Wellness Visit, and Future           Appointments to ensure appointments have been scheduled and/or           completed        Additional Notes:             Social Determinants of Health          [x]  Reviewed, completed, and/or updated the following sections:                  Food Insecurity, Transportation Needs, Financial Resource Strain,                 Tobacco Use        Additional Notes:  [facility] reached out to pt on 2-24-25 regarding SDOH. [facility] closed case.           Care Management, Digital Medicine, and/or Education Referrals    OPCM Risk Score: 52.8         Next Steps - Referral Actions: Digital Medicine Outcomes and Actions Taken: Pt Declined or Not Eligible        Additional Notes:

## 2025-08-20 DIAGNOSIS — E11.9 TYPE 2 DIABETES MELLITUS WITHOUT COMPLICATION, WITHOUT LONG-TERM CURRENT USE OF INSULIN: ICD-10-CM

## 2025-08-20 RX ORDER — ISOPROPYL ALCOHOL 70 ML/100ML
SWAB TOPICAL
Qty: 200 EACH | Refills: 3 | Status: SHIPPED | OUTPATIENT
Start: 2025-08-20

## 2025-08-20 RX ORDER — DULAGLUTIDE 3 MG/.5ML
3 INJECTION, SOLUTION SUBCUTANEOUS
Qty: 12 PEN | Refills: 1 | Status: SHIPPED | OUTPATIENT
Start: 2025-08-20 | End: 2026-02-16

## 2025-09-03 DIAGNOSIS — E11.9 TYPE 2 DIABETES MELLITUS WITHOUT COMPLICATION, WITHOUT LONG-TERM CURRENT USE OF INSULIN: ICD-10-CM

## 2025-09-03 RX ORDER — LANCETS 33 GAUGE
EACH MISCELLANEOUS 2 TIMES DAILY
Qty: 200 EACH | Refills: 3 | Status: SHIPPED | OUTPATIENT
Start: 2025-09-03

## 2025-09-03 RX ORDER — CALCIUM CITRATE/VITAMIN D3 200MG-6.25
TABLET ORAL 2 TIMES DAILY
Qty: 200 STRIP | Refills: 3 | Status: SHIPPED | OUTPATIENT
Start: 2025-09-03

## 2025-09-05 ENCOUNTER — HOSPITAL ENCOUNTER (OUTPATIENT)
Dept: RADIOLOGY | Facility: HOSPITAL | Age: 70
Discharge: HOME | End: 2025-09-05
Attending: NURSE PRACTITIONER
Payer: MEDICARE

## 2025-09-05 DIAGNOSIS — R60.0 LOCALIZED EDEMA: ICD-10-CM

## 2025-09-05 PROCEDURE — 76536 US EXAM OF HEAD AND NECK: CPT | Mod: 26,,, | Performed by: RADIOLOGY

## 2025-09-05 PROCEDURE — 76536 US EXAM OF HEAD AND NECK: CPT | Mod: TC,PO
